# Patient Record
Sex: FEMALE | Race: WHITE | NOT HISPANIC OR LATINO | Employment: FULL TIME | URBAN - METROPOLITAN AREA
[De-identification: names, ages, dates, MRNs, and addresses within clinical notes are randomized per-mention and may not be internally consistent; named-entity substitution may affect disease eponyms.]

---

## 2017-01-27 ENCOUNTER — OFFICE VISIT (OUTPATIENT)
Dept: INTERNAL MEDICINE | Facility: CLINIC | Age: 57
End: 2017-01-27
Payer: COMMERCIAL

## 2017-01-27 ENCOUNTER — HOSPITAL ENCOUNTER (OUTPATIENT)
Dept: RADIOLOGY | Facility: HOSPITAL | Age: 57
Discharge: HOME | End: 2017-01-27
Attending: INTERNAL MEDICINE
Payer: COMMERCIAL

## 2017-01-27 VITALS
HEIGHT: 63 IN | HEART RATE: 68 BPM | SYSTOLIC BLOOD PRESSURE: 120 MMHG | WEIGHT: 151.69 LBS | BODY MASS INDEX: 26.88 KG/M2 | DIASTOLIC BLOOD PRESSURE: 84 MMHG

## 2017-01-27 DIAGNOSIS — J44.9 CHRONIC OBSTRUCTIVE PULMONARY DISEASE, UNSPECIFIED COPD TYPE: Primary | ICD-10-CM

## 2017-01-27 DIAGNOSIS — F17.200 SMOKER: ICD-10-CM

## 2017-01-27 DIAGNOSIS — Z12.31 ENCOUNTER FOR SCREENING MAMMOGRAM FOR MALIGNANT NEOPLASM OF BREAST: ICD-10-CM

## 2017-01-27 PROCEDURE — 77067 SCR MAMMO BI INCL CAD: CPT | Mod: TC

## 2017-01-27 PROCEDURE — 77067 SCR MAMMO BI INCL CAD: CPT | Mod: 26,,, | Performed by: RADIOLOGY

## 2017-01-27 PROCEDURE — 99999 PR PBB SHADOW E&M-EST. PATIENT-LVL III: CPT | Mod: PBBFAC,,, | Performed by: INTERNAL MEDICINE

## 2017-01-27 PROCEDURE — 1159F MED LIST DOCD IN RCRD: CPT | Mod: S$GLB,,, | Performed by: INTERNAL MEDICINE

## 2017-01-27 PROCEDURE — 99214 OFFICE O/P EST MOD 30 MIN: CPT | Mod: S$GLB,,, | Performed by: INTERNAL MEDICINE

## 2017-01-27 RX ORDER — FLUTICASONE PROPIONATE AND SALMETEROL 100; 50 UG/1; UG/1
POWDER RESPIRATORY (INHALATION)
Qty: 180 EACH | Refills: 3 | Status: ON HOLD | OUTPATIENT
Start: 2017-01-27 | End: 2018-01-27

## 2017-01-31 NOTE — PROGRESS NOTES
Subjective:       Patient ID: Cassia Kelly is a 57 y.o. female.    Chief Complaint: Annual Exam    Shortness of Breath   This is a chronic problem. The current episode started more than 1 year ago. The problem occurs intermittently. The problem has been unchanged. Associated symptoms include wheezing. Pertinent negatives include no chest pain, fever, leg swelling, orthopnea or sputum production. The symptoms are aggravated by smoke. She has tried beta agonist inhalers and steroid inhalers for the symptoms. The treatment provided mild relief. Her past medical history is significant for COPD.     Review of Systems   Constitutional: Negative for fever.   Respiratory: Positive for shortness of breath and wheezing. Negative for sputum production.    Cardiovascular: Negative for chest pain, orthopnea and leg swelling.       Objective:      Physical Exam   Constitutional: She is oriented to person, place, and time. She appears well-developed and well-nourished. No distress.   HENT:   Head: Normocephalic and atraumatic.   Mouth/Throat: Oropharynx is clear and moist.   Eyes: Conjunctivae are normal. Pupils are equal, round, and reactive to light.   Neck: Normal range of motion. Neck supple.   Cardiovascular: Normal rate, regular rhythm and normal heart sounds.    Pulmonary/Chest: Effort normal and breath sounds normal. She has no wheezes.   Abdominal: Soft. Bowel sounds are normal. There is no tenderness.   Musculoskeletal: Normal range of motion. She exhibits no edema or tenderness.   Neurological: She is alert and oriented to person, place, and time. No cranial nerve deficit.   Skin: No erythema.   Psychiatric: She has a normal mood and affect.   Vitals reviewed.      Assessment:       1. Chronic obstructive pulmonary disease, unspecified COPD type    2. Smoker        Plan:       Cassia Antonio was seen today for annual exam.    Diagnoses and all orders for this visit:    Chronic obstructive pulmonary disease,  unspecified COPD type  -     fluticasone-salmeterol 100-50 mcg/dose (ADVAIR DISKUS) 100-50 mcg/dose diskus inhaler; INHALE 1 PUFF INTO THE LUNGS   2 (TWO) TIMES DAILY.  -     ipratropium-albuterol (COMBIVENT RESPIMAT)  mcg/actuation inhaler; INHALE 1 PUFF INTO THE LUNGS  4 (FOUR) TIMES DAILY.    Smoker  Comments:  not interested in cessation service        Return in about 6 months (around 7/27/2017) for F/U APPOINTMENT WITH ME.

## 2017-03-13 ENCOUNTER — TELEPHONE (OUTPATIENT)
Dept: INTERNAL MEDICINE | Facility: CLINIC | Age: 57
End: 2017-03-13

## 2017-03-13 DIAGNOSIS — Z12.31 ENCOUNTER FOR SCREENING MAMMOGRAM FOR MALIGNANT NEOPLASM OF BREAST: Primary | ICD-10-CM

## 2017-03-13 NOTE — TELEPHONE ENCOUNTER
----- Message from Marco A Rondon MD sent at 3/10/2017  3:59 PM CST -----  Mammogram OK, repeat in 1 yr

## 2017-09-11 DIAGNOSIS — J44.9 CHRONIC OBSTRUCTIVE PULMONARY DISEASE, UNSPECIFIED COPD TYPE: ICD-10-CM

## 2017-09-11 NOTE — TELEPHONE ENCOUNTER
----- Message from Garrett Freddie sent at 9/8/2017  3:54 PM CDT -----  Contact: self/ [phone] cell  Type: Rx    Name of medication(s): ipratropium-albuterol (COMBIVENT RESPIMAT)  mcg/actuation inhaler    Is this a refill? New rx? Refill    Who prescribed medication? Dr. Rondon    Pharmacy Name, Phone, & Location: [facility] on file    Comments: Pt would like to request a 90 day refill with refills on the medication above.  The pt was told by the pharmacy that they have sent several request, but there is no indication in the system.  Please call and advise.    Thank you

## 2017-11-06 DIAGNOSIS — J44.9 CHRONIC OBSTRUCTIVE PULMONARY DISEASE, UNSPECIFIED COPD TYPE: ICD-10-CM

## 2017-11-06 NOTE — TELEPHONE ENCOUNTER
"----- Message from Teresa Howard sent at 11/6/2017  3:52 PM CST -----  Contact: call pt at [phone]  RX request - refill or new RX.  Is this a refill or new RX:  Refill   RX name and strength: ipratropium-albuterol (COMBIVENT RESPIMAT)  mcg/actuation inhaler  Directions:   Is this a 30 day or 90 day RX:  90 day   Pharmacy name and phone # (DON'T enter "on file" or "in chart"): [id number] - [place]  [address] [phone] (Phone)   Comments:        "

## 2018-01-24 ENCOUNTER — HOSPITAL ENCOUNTER (OUTPATIENT)
Facility: HOSPITAL | Age: 58
Discharge: HOME | End: 2018-01-27
Attending: EMERGENCY MEDICINE | Admitting: HOSPITALIST
Payer: COMMERCIAL

## 2018-01-24 DIAGNOSIS — N39.0 URINARY TRACT INFECTION WITHOUT HEMATURIA, SITE UNSPECIFIED: ICD-10-CM

## 2018-01-24 DIAGNOSIS — R79.89 ELEVATED TROPONIN: ICD-10-CM

## 2018-01-24 DIAGNOSIS — I24.9 ACUTE CORONARY SYNDROME: ICD-10-CM

## 2018-01-24 DIAGNOSIS — J44.9 CHRONIC OBSTRUCTIVE PULMONARY DISEASE, UNSPECIFIED COPD TYPE: ICD-10-CM

## 2018-01-24 DIAGNOSIS — R07.9 CHEST PAIN: ICD-10-CM

## 2018-01-24 DIAGNOSIS — J44.1 COPD EXACERBATION: Primary | ICD-10-CM

## 2018-01-24 DIAGNOSIS — J20.9 ACUTE BRONCHITIS, UNSPECIFIED ORGANISM: ICD-10-CM

## 2018-01-24 PROBLEM — M54.6 ACUTE THORACIC BACK PAIN: Status: ACTIVE | Noted: 2018-01-24

## 2018-01-24 PROBLEM — N30.00 ACUTE CYSTITIS: Status: ACTIVE | Noted: 2018-01-24

## 2018-01-24 LAB
ALBUMIN SERPL BCP-MCNC: 3.7 G/DL
ALP SERPL-CCNC: 153 U/L
ALT SERPL W/O P-5'-P-CCNC: 31 U/L
ANION GAP SERPL CALC-SCNC: 8 MMOL/L
AST SERPL-CCNC: 32 U/L
BACTERIA #/AREA URNS AUTO: ABNORMAL /HPF
BASOPHILS # BLD AUTO: 0.02 K/UL
BASOPHILS NFR BLD: 0.3 %
BILIRUB SERPL-MCNC: 1.2 MG/DL
BILIRUB UR QL STRIP: NEGATIVE
BNP SERPL-MCNC: 47 PG/ML
BUN SERPL-MCNC: 11 MG/DL
CALCIUM SERPL-MCNC: 9.2 MG/DL
CHLORIDE SERPL-SCNC: 106 MMOL/L
CLARITY UR REFRACT.AUTO: ABNORMAL
CO2 SERPL-SCNC: 27 MMOL/L
COLOR UR AUTO: ABNORMAL
CREAT SERPL-MCNC: 0.7 MG/DL
D DIMER PPP IA.FEU-MCNC: 8.54 MG/L FEU
DIFFERENTIAL METHOD: ABNORMAL
EOSINOPHIL # BLD AUTO: 0 K/UL
EOSINOPHIL NFR BLD: 0.3 %
ERYTHROCYTE [DISTWIDTH] IN BLOOD BY AUTOMATED COUNT: 12.7 %
EST. GFR  (AFRICAN AMERICAN): >60 ML/MIN/1.73 M^2
EST. GFR  (NON AFRICAN AMERICAN): >60 ML/MIN/1.73 M^2
ESTIMATED AVG GLUCOSE: 103 MG/DL
FLUAV AG SPEC QL IA: NEGATIVE
FLUBV AG SPEC QL IA: NEGATIVE
GLUCOSE SERPL-MCNC: 110 MG/DL
GLUCOSE UR QL STRIP: NEGATIVE
HBA1C MFR BLD HPLC: 5.2 %
HCT VFR BLD AUTO: 41.3 %
HGB BLD-MCNC: 13.8 G/DL
HGB UR QL STRIP: ABNORMAL
HYALINE CASTS UR QL AUTO: 2 /LPF
IMM GRANULOCYTES # BLD AUTO: 0.04 K/UL
IMM GRANULOCYTES NFR BLD AUTO: 0.6 %
KETONES UR QL STRIP: NEGATIVE
LACTATE SERPL-SCNC: 2.5 MMOL/L
LDH SERPL L TO P-CCNC: 220 U/L
LEUKOCYTE ESTERASE UR QL STRIP: ABNORMAL
LYMPHOCYTES # BLD AUTO: 0.3 K/UL
LYMPHOCYTES NFR BLD: 4.3 %
MCH RBC QN AUTO: 31.3 PG
MCHC RBC AUTO-ENTMCNC: 33.4 G/DL
MCV RBC AUTO: 94 FL
MICROSCOPIC COMMENT: ABNORMAL
MONOCYTES # BLD AUTO: 0 K/UL
MONOCYTES NFR BLD: 0.3 %
NEUTROPHILS # BLD AUTO: 6.4 K/UL
NEUTROPHILS NFR BLD: 94.2 %
NITRITE UR QL STRIP: POSITIVE
NON-SQ EPI CELLS #/AREA URNS AUTO: 1 /HPF
NRBC BLD-RTO: 0 /100 WBC
PH UR STRIP: 5 [PH] (ref 5–8)
PLATELET # BLD AUTO: 142 K/UL
PMV BLD AUTO: 11.3 FL
POCT GLUCOSE: 135 MG/DL (ref 70–110)
POTASSIUM SERPL-SCNC: 4.1 MMOL/L
PROCALCITONIN SERPL IA-MCNC: 22.37 NG/ML
PROT SERPL-MCNC: 7.3 G/DL
PROT UR QL STRIP: ABNORMAL
RBC # BLD AUTO: 4.41 M/UL
RBC #/AREA URNS AUTO: 24 /HPF (ref 0–4)
SODIUM SERPL-SCNC: 141 MMOL/L
SP GR UR STRIP: 1.02 (ref 1–1.03)
SPECIMEN SOURCE: NORMAL
SQUAMOUS #/AREA URNS AUTO: 0 /HPF
TROPONIN I SERPL DL<=0.01 NG/ML-MCNC: 0.5 NG/ML
URN SPEC COLLECT METH UR: ABNORMAL
UROBILINOGEN UR STRIP-ACNC: 2 EU/DL
WBC # BLD AUTO: 6.76 K/UL
WBC #/AREA URNS AUTO: 32 /HPF (ref 0–5)

## 2018-01-24 PROCEDURE — 63600175 PHARM REV CODE 636 W HCPCS: Performed by: EMERGENCY MEDICINE

## 2018-01-24 PROCEDURE — 83615 LACTATE (LD) (LDH) ENZYME: CPT

## 2018-01-24 PROCEDURE — 87186 SC STD MICRODIL/AGAR DIL: CPT

## 2018-01-24 PROCEDURE — 85025 COMPLETE CBC W/AUTO DIFF WBC: CPT

## 2018-01-24 PROCEDURE — 87040 BLOOD CULTURE FOR BACTERIA: CPT | Mod: 59

## 2018-01-24 PROCEDURE — 99285 EMERGENCY DEPT VISIT HI MDM: CPT | Mod: 25

## 2018-01-24 PROCEDURE — 96361 HYDRATE IV INFUSION ADD-ON: CPT

## 2018-01-24 PROCEDURE — 25000242 PHARM REV CODE 250 ALT 637 W/ HCPCS: Performed by: EMERGENCY MEDICINE

## 2018-01-24 PROCEDURE — 85379 FIBRIN DEGRADATION QUANT: CPT

## 2018-01-24 PROCEDURE — 25000003 PHARM REV CODE 250: Performed by: EMERGENCY MEDICINE

## 2018-01-24 PROCEDURE — 87086 URINE CULTURE/COLONY COUNT: CPT

## 2018-01-24 PROCEDURE — 83036 HEMOGLOBIN GLYCOSYLATED A1C: CPT

## 2018-01-24 PROCEDURE — 84484 ASSAY OF TROPONIN QUANT: CPT

## 2018-01-24 PROCEDURE — 25000003 PHARM REV CODE 250: Performed by: HOSPITALIST

## 2018-01-24 PROCEDURE — 93005 ELECTROCARDIOGRAM TRACING: CPT

## 2018-01-24 PROCEDURE — 25500020 PHARM REV CODE 255: Performed by: HOSPITALIST

## 2018-01-24 PROCEDURE — 87040 BLOOD CULTURE FOR BACTERIA: CPT

## 2018-01-24 PROCEDURE — 84484 ASSAY OF TROPONIN QUANT: CPT | Mod: 91

## 2018-01-24 PROCEDURE — 83605 ASSAY OF LACTIC ACID: CPT

## 2018-01-24 PROCEDURE — 80053 COMPREHEN METABOLIC PANEL: CPT

## 2018-01-24 PROCEDURE — 87077 CULTURE AEROBIC IDENTIFY: CPT

## 2018-01-24 PROCEDURE — 94640 AIRWAY INHALATION TREATMENT: CPT

## 2018-01-24 PROCEDURE — 87088 URINE BACTERIA CULTURE: CPT

## 2018-01-24 PROCEDURE — 96374 THER/PROPH/DIAG INJ IV PUSH: CPT

## 2018-01-24 PROCEDURE — 82962 GLUCOSE BLOOD TEST: CPT

## 2018-01-24 PROCEDURE — 86140 C-REACTIVE PROTEIN: CPT

## 2018-01-24 PROCEDURE — G0378 HOSPITAL OBSERVATION PER HR: HCPCS

## 2018-01-24 PROCEDURE — 81001 URINALYSIS AUTO W/SCOPE: CPT

## 2018-01-24 PROCEDURE — 83880 ASSAY OF NATRIURETIC PEPTIDE: CPT

## 2018-01-24 PROCEDURE — 93010 ELECTROCARDIOGRAM REPORT: CPT | Mod: 76,,, | Performed by: INTERNAL MEDICINE

## 2018-01-24 PROCEDURE — 99220 PR INITIAL OBSERVATION CARE,LEVL III: CPT | Mod: ,,, | Performed by: PHYSICIAN ASSISTANT

## 2018-01-24 PROCEDURE — 84145 PROCALCITONIN (PCT): CPT

## 2018-01-24 PROCEDURE — 87400 INFLUENZA A/B EACH AG IA: CPT | Mod: 59

## 2018-01-24 RX ORDER — ASPIRIN 325 MG
325 TABLET ORAL
Status: CANCELLED | OUTPATIENT
Start: 2018-01-24 | End: 2018-01-24

## 2018-01-24 RX ORDER — ENOXAPARIN SODIUM 100 MG/ML
1 INJECTION SUBCUTANEOUS
Status: DISCONTINUED | OUTPATIENT
Start: 2018-01-25 | End: 2018-01-25

## 2018-01-24 RX ORDER — KETOROLAC TROMETHAMINE 30 MG/ML
15 INJECTION, SOLUTION INTRAMUSCULAR; INTRAVENOUS EVERY 6 HOURS PRN
Status: DISCONTINUED | OUTPATIENT
Start: 2018-01-24 | End: 2018-01-27 | Stop reason: HOSPADM

## 2018-01-24 RX ORDER — IPRATROPIUM BROMIDE AND ALBUTEROL SULFATE 2.5; .5 MG/3ML; MG/3ML
3 SOLUTION RESPIRATORY (INHALATION) EVERY 4 HOURS
Status: DISCONTINUED | OUTPATIENT
Start: 2018-01-25 | End: 2018-01-27 | Stop reason: HOSPADM

## 2018-01-24 RX ORDER — RAMELTEON 8 MG/1
8 TABLET ORAL NIGHTLY PRN
Status: DISCONTINUED | OUTPATIENT
Start: 2018-01-24 | End: 2018-01-27 | Stop reason: HOSPADM

## 2018-01-24 RX ORDER — PREDNISONE 20 MG/1
40 TABLET ORAL DAILY
Status: DISCONTINUED | OUTPATIENT
Start: 2018-01-25 | End: 2018-01-27 | Stop reason: HOSPADM

## 2018-01-24 RX ORDER — ONDANSETRON 2 MG/ML
4 INJECTION INTRAMUSCULAR; INTRAVENOUS EVERY 12 HOURS PRN
Status: DISCONTINUED | OUTPATIENT
Start: 2018-01-24 | End: 2018-01-27 | Stop reason: HOSPADM

## 2018-01-24 RX ORDER — IBUPROFEN 200 MG
1 TABLET ORAL DAILY
Status: DISCONTINUED | OUTPATIENT
Start: 2018-01-25 | End: 2018-01-27 | Stop reason: HOSPADM

## 2018-01-24 RX ORDER — PREDNISONE 20 MG/1
40 TABLET ORAL DAILY
Qty: 10 TABLET | Refills: 0 | Status: SHIPPED | OUTPATIENT
Start: 2018-01-24 | End: 2018-01-29

## 2018-01-24 RX ORDER — POLYETHYLENE GLYCOL 3350 17 G/17G
17 POWDER, FOR SOLUTION ORAL DAILY
Status: DISCONTINUED | OUTPATIENT
Start: 2018-01-25 | End: 2018-01-27 | Stop reason: HOSPADM

## 2018-01-24 RX ORDER — IPRATROPIUM BROMIDE AND ALBUTEROL SULFATE 2.5; .5 MG/3ML; MG/3ML
3 SOLUTION RESPIRATORY (INHALATION)
Status: COMPLETED | OUTPATIENT
Start: 2018-01-24 | End: 2018-01-24

## 2018-01-24 RX ORDER — TIOTROPIUM BROMIDE 18 UG/1
1 CAPSULE ORAL; RESPIRATORY (INHALATION) DAILY
Status: DISCONTINUED | OUTPATIENT
Start: 2018-01-25 | End: 2018-01-27 | Stop reason: HOSPADM

## 2018-01-24 RX ORDER — DOXYCYCLINE HYCLATE 100 MG
100 TABLET ORAL
Status: COMPLETED | OUTPATIENT
Start: 2018-01-24 | End: 2018-01-24

## 2018-01-24 RX ORDER — IBUPROFEN 200 MG
16 TABLET ORAL
Status: DISCONTINUED | OUTPATIENT
Start: 2018-01-24 | End: 2018-01-27 | Stop reason: HOSPADM

## 2018-01-24 RX ORDER — HYDROCODONE BITARTRATE AND ACETAMINOPHEN 5; 325 MG/1; MG/1
1 TABLET ORAL
Status: COMPLETED | OUTPATIENT
Start: 2018-01-24 | End: 2018-01-24

## 2018-01-24 RX ORDER — DOXYCYCLINE 100 MG/1
100 CAPSULE ORAL 2 TIMES DAILY
Qty: 20 CAPSULE | Refills: 0 | Status: SHIPPED | OUTPATIENT
Start: 2018-01-24 | End: 2018-02-03

## 2018-01-24 RX ORDER — LEVOFLOXACIN 500 MG/1
750 TABLET, FILM COATED ORAL DAILY
Qty: 5 TABLET | Refills: 0 | Status: SHIPPED | OUTPATIENT
Start: 2018-01-24 | End: 2018-01-29

## 2018-01-24 RX ORDER — ENOXAPARIN SODIUM 100 MG/ML
40 INJECTION SUBCUTANEOUS EVERY 24 HOURS
Status: DISCONTINUED | OUTPATIENT
Start: 2018-01-24 | End: 2018-01-24

## 2018-01-24 RX ORDER — CEFTRIAXONE 1 G/1
1 INJECTION, POWDER, FOR SOLUTION INTRAMUSCULAR; INTRAVENOUS
Status: COMPLETED | OUTPATIENT
Start: 2018-01-24 | End: 2018-01-24

## 2018-01-24 RX ORDER — ACETAMINOPHEN 325 MG/1
650 TABLET ORAL EVERY 8 HOURS PRN
Status: DISCONTINUED | OUTPATIENT
Start: 2018-01-24 | End: 2018-01-24

## 2018-01-24 RX ORDER — MOXIFLOXACIN HYDROCHLORIDE 400 MG/1
400 TABLET ORAL DAILY
Status: DISCONTINUED | OUTPATIENT
Start: 2018-01-25 | End: 2018-01-25

## 2018-01-24 RX ORDER — ACETAMINOPHEN 325 MG/1
650 TABLET ORAL EVERY 4 HOURS PRN
Status: DISCONTINUED | OUTPATIENT
Start: 2018-01-24 | End: 2018-01-27 | Stop reason: HOSPADM

## 2018-01-24 RX ORDER — METHYLPREDNISOLONE SOD SUCC 125 MG
125 VIAL (EA) INJECTION
Status: COMPLETED | OUTPATIENT
Start: 2018-01-24 | End: 2018-01-24

## 2018-01-24 RX ORDER — GLUCAGON 1 MG
1 KIT INJECTION
Status: DISCONTINUED | OUTPATIENT
Start: 2018-01-24 | End: 2018-01-27 | Stop reason: HOSPADM

## 2018-01-24 RX ORDER — BISACODYL 10 MG
10 SUPPOSITORY, RECTAL RECTAL DAILY PRN
Status: DISCONTINUED | OUTPATIENT
Start: 2018-01-24 | End: 2018-01-27 | Stop reason: HOSPADM

## 2018-01-24 RX ORDER — ASPIRIN 325 MG
325 TABLET, DELAYED RELEASE (ENTERIC COATED) ORAL
Status: COMPLETED | OUTPATIENT
Start: 2018-01-24 | End: 2018-01-24

## 2018-01-24 RX ORDER — ASPIRIN 81 MG/1
81 TABLET ORAL DAILY
Status: DISCONTINUED | OUTPATIENT
Start: 2018-01-25 | End: 2018-01-27 | Stop reason: HOSPADM

## 2018-01-24 RX ORDER — NITROGLYCERIN 0.4 MG/1
0.4 TABLET SUBLINGUAL
Status: COMPLETED | OUTPATIENT
Start: 2018-01-24 | End: 2018-01-24

## 2018-01-24 RX ORDER — ASPIRIN 325 MG
325 TABLET ORAL
Status: DISCONTINUED | OUTPATIENT
Start: 2018-01-24 | End: 2018-01-24

## 2018-01-24 RX ORDER — FLUTICASONE FUROATE AND VILANTEROL 100; 25 UG/1; UG/1
1 POWDER RESPIRATORY (INHALATION) DAILY
Status: DISCONTINUED | OUTPATIENT
Start: 2018-01-25 | End: 2018-01-27 | Stop reason: HOSPADM

## 2018-01-24 RX ORDER — IBUPROFEN 200 MG
24 TABLET ORAL
Status: DISCONTINUED | OUTPATIENT
Start: 2018-01-24 | End: 2018-01-27 | Stop reason: HOSPADM

## 2018-01-24 RX ORDER — ONDANSETRON 8 MG/1
8 TABLET, ORALLY DISINTEGRATING ORAL EVERY 8 HOURS PRN
Status: DISCONTINUED | OUTPATIENT
Start: 2018-01-24 | End: 2018-01-27 | Stop reason: HOSPADM

## 2018-01-24 RX ORDER — HYDROCODONE BITARTRATE AND ACETAMINOPHEN 5; 325 MG/1; MG/1
1 TABLET ORAL EVERY 4 HOURS PRN
Qty: 10 TABLET | Refills: 0 | Status: SHIPPED | OUTPATIENT
Start: 2018-01-24 | End: 2018-01-31

## 2018-01-24 RX ADMIN — ASPIRIN 325 MG: 325 TABLET, DELAYED RELEASE ORAL at 08:01

## 2018-01-24 RX ADMIN — IPRATROPIUM BROMIDE AND ALBUTEROL SULFATE 3 ML: .5; 3 SOLUTION RESPIRATORY (INHALATION) at 07:01

## 2018-01-24 RX ADMIN — SODIUM CHLORIDE 1000 ML: 0.9 INJECTION, SOLUTION INTRAVENOUS at 08:01

## 2018-01-24 RX ADMIN — IOHEXOL 75 ML: 350 INJECTION, SOLUTION INTRAVENOUS at 11:01

## 2018-01-24 RX ADMIN — METHYLPREDNISOLONE SODIUM SUCCINATE 125 MG: 125 INJECTION, POWDER, FOR SOLUTION INTRAMUSCULAR; INTRAVENOUS at 08:01

## 2018-01-24 RX ADMIN — DOXYCYCLINE HYCLATE 100 MG: 100 TABLET, COATED ORAL at 08:01

## 2018-01-24 RX ADMIN — NITROGLYCERIN 0.4 MG: 0.4 TABLET SUBLINGUAL at 09:01

## 2018-01-24 RX ADMIN — CEFTRIAXONE SODIUM 1 G: 1 INJECTION, POWDER, FOR SOLUTION INTRAMUSCULAR; INTRAVENOUS at 08:01

## 2018-01-24 RX ADMIN — SODIUM CHLORIDE 1000 ML: 0.9 INJECTION, SOLUTION INTRAVENOUS at 09:01

## 2018-01-24 RX ADMIN — HYDROCODONE BITARTRATE AND ACETAMINOPHEN 1 TABLET: 5; 325 TABLET ORAL at 09:01

## 2018-01-24 NOTE — ED TRIAGE NOTES
"Pt states she suddenly started shaking violently  Pt put her 's oxygen on 3 L/min per nasal cannula because she was short of breath  Pt arrived in room with O2 at 3L/dwight per nasal cannula Pt complains of upper back pain rating pain a 5/10  States she still feels " a little short of breath"   "

## 2018-01-25 PROBLEM — R79.89 ELEVATED D-DIMER: Status: ACTIVE | Noted: 2018-01-25

## 2018-01-25 PROBLEM — R79.89 ELEVATED PROCALCITONIN: Status: ACTIVE | Noted: 2018-01-25

## 2018-01-25 LAB
BASOPHILS # BLD AUTO: 0.01 K/UL
BASOPHILS NFR BLD: 0.1 %
CHOLEST SERPL-MCNC: 167 MG/DL
CHOLEST/HDLC SERPL: 4.6 {RATIO}
CRP SERPL-MCNC: 66.4 MG/L
DIASTOLIC DYSFUNCTION: NO
DIFFERENTIAL METHOD: ABNORMAL
EOSINOPHIL # BLD AUTO: 0 K/UL
EOSINOPHIL NFR BLD: 0 %
ERYTHROCYTE [DISTWIDTH] IN BLOOD BY AUTOMATED COUNT: 12.6 %
ERYTHROCYTE [SEDIMENTATION RATE] IN BLOOD BY WESTERGREN METHOD: 20 MM/HR
HCT VFR BLD AUTO: 34.5 %
HDLC SERPL-MCNC: 36 MG/DL
HDLC SERPL: 21.6 %
HGB BLD-MCNC: 11.9 G/DL
IMM GRANULOCYTES # BLD AUTO: 0.03 K/UL
IMM GRANULOCYTES NFR BLD AUTO: 0.3 %
LDLC SERPL CALC-MCNC: 107 MG/DL
LYMPHOCYTES # BLD AUTO: 0.4 K/UL
LYMPHOCYTES NFR BLD: 3.8 %
MCH RBC QN AUTO: 31.9 PG
MCHC RBC AUTO-ENTMCNC: 34.5 G/DL
MCV RBC AUTO: 93 FL
MITRAL VALVE MOBILITY: NORMAL
MONOCYTES # BLD AUTO: 0.1 K/UL
MONOCYTES NFR BLD: 0.9 %
NEUTROPHILS # BLD AUTO: 9.5 K/UL
NEUTROPHILS NFR BLD: 94.9 %
NONHDLC SERPL-MCNC: 131 MG/DL
NRBC BLD-RTO: 0 /100 WBC
PLATELET # BLD AUTO: 127 K/UL
PMV BLD AUTO: 12.4 FL
POCT GLUCOSE: 137 MG/DL (ref 70–110)
POCT GLUCOSE: 191 MG/DL (ref 70–110)
RBC # BLD AUTO: 3.73 M/UL
RETIRED EF AND QEF - SEE NOTES: 55 (ref 55–65)
TRIGL SERPL-MCNC: 120 MG/DL
TROPONIN I SERPL DL<=0.01 NG/ML-MCNC: 0.5 NG/ML
TROPONIN I SERPL DL<=0.01 NG/ML-MCNC: 0.98 NG/ML
WBC # BLD AUTO: 9.97 K/UL

## 2018-01-25 PROCEDURE — G0378 HOSPITAL OBSERVATION PER HR: HCPCS

## 2018-01-25 PROCEDURE — 94640 AIRWAY INHALATION TREATMENT: CPT

## 2018-01-25 PROCEDURE — 97165 OT EVAL LOW COMPLEX 30 MIN: CPT

## 2018-01-25 PROCEDURE — 25000003 PHARM REV CODE 250: Performed by: HOSPITALIST

## 2018-01-25 PROCEDURE — 84484 ASSAY OF TROPONIN QUANT: CPT

## 2018-01-25 PROCEDURE — 63600175 PHARM REV CODE 636 W HCPCS: Performed by: PHYSICIAN ASSISTANT

## 2018-01-25 PROCEDURE — 25000003 PHARM REV CODE 250: Performed by: PHYSICIAN ASSISTANT

## 2018-01-25 PROCEDURE — 93306 TTE W/DOPPLER COMPLETE: CPT | Mod: 26,,, | Performed by: INTERNAL MEDICINE

## 2018-01-25 PROCEDURE — S4991 NICOTINE PATCH NONLEGEND: HCPCS | Performed by: PHYSICIAN ASSISTANT

## 2018-01-25 PROCEDURE — G8987 SELF CARE CURRENT STATUS: HCPCS | Mod: CH

## 2018-01-25 PROCEDURE — 25000242 PHARM REV CODE 250 ALT 637 W/ HCPCS: Performed by: PHYSICIAN ASSISTANT

## 2018-01-25 PROCEDURE — 97161 PT EVAL LOW COMPLEX 20 MIN: CPT

## 2018-01-25 PROCEDURE — G8980 MOBILITY D/C STATUS: HCPCS | Mod: CH

## 2018-01-25 PROCEDURE — G8978 MOBILITY CURRENT STATUS: HCPCS | Mod: CH

## 2018-01-25 PROCEDURE — G8988 SELF CARE GOAL STATUS: HCPCS | Mod: CH

## 2018-01-25 PROCEDURE — 27000221 HC OXYGEN, UP TO 24 HOURS

## 2018-01-25 PROCEDURE — 36415 COLL VENOUS BLD VENIPUNCTURE: CPT

## 2018-01-25 PROCEDURE — 80061 LIPID PANEL: CPT

## 2018-01-25 PROCEDURE — 99226 PR SUBSEQUENT OBSERVATION CARE,LEVEL III: CPT | Mod: ,,, | Performed by: PHYSICIAN ASSISTANT

## 2018-01-25 PROCEDURE — G8989 SELF CARE D/C STATUS: HCPCS | Mod: CH

## 2018-01-25 PROCEDURE — 94761 N-INVAS EAR/PLS OXIMETRY MLT: CPT

## 2018-01-25 PROCEDURE — 85651 RBC SED RATE NONAUTOMATED: CPT

## 2018-01-25 PROCEDURE — 93306 TTE W/DOPPLER COMPLETE: CPT

## 2018-01-25 PROCEDURE — 85025 COMPLETE CBC W/AUTO DIFF WBC: CPT

## 2018-01-25 RX ORDER — AZITHROMYCIN 250 MG/1
500 TABLET, FILM COATED ORAL ONCE
Status: COMPLETED | OUTPATIENT
Start: 2018-01-25 | End: 2018-01-25

## 2018-01-25 RX ORDER — AZITHROMYCIN 250 MG/1
250 TABLET, FILM COATED ORAL DAILY
Status: DISCONTINUED | OUTPATIENT
Start: 2018-01-26 | End: 2018-01-27 | Stop reason: HOSPADM

## 2018-01-25 RX ORDER — CEFTRIAXONE 1 G/1
1 INJECTION, POWDER, FOR SOLUTION INTRAMUSCULAR; INTRAVENOUS
Status: DISCONTINUED | OUTPATIENT
Start: 2018-01-25 | End: 2018-01-27 | Stop reason: HOSPADM

## 2018-01-25 RX ORDER — OSELTAMIVIR PHOSPHATE 75 MG/1
75 CAPSULE ORAL 2 TIMES DAILY
Status: DISCONTINUED | OUTPATIENT
Start: 2018-01-25 | End: 2018-01-27 | Stop reason: HOSPADM

## 2018-01-25 RX ORDER — AZITHROMYCIN 250 MG/1
250 TABLET, FILM COATED ORAL DAILY
Status: DISCONTINUED | OUTPATIENT
Start: 2018-01-26 | End: 2018-01-25

## 2018-01-25 RX ORDER — ENOXAPARIN SODIUM 100 MG/ML
40 INJECTION SUBCUTANEOUS EVERY 24 HOURS
Status: DISCONTINUED | OUTPATIENT
Start: 2018-01-25 | End: 2018-01-27 | Stop reason: HOSPADM

## 2018-01-25 RX ORDER — AZITHROMYCIN 250 MG/1
500 TABLET, FILM COATED ORAL DAILY
Status: DISCONTINUED | OUTPATIENT
Start: 2018-01-25 | End: 2018-01-25

## 2018-01-25 RX ORDER — AZITHROMYCIN 250 MG/1
500 TABLET, FILM COATED ORAL ONCE
Status: DISCONTINUED | OUTPATIENT
Start: 2018-01-25 | End: 2018-01-25

## 2018-01-25 RX ADMIN — ENOXAPARIN SODIUM 40 MG: 100 INJECTION SUBCUTANEOUS at 05:01

## 2018-01-25 RX ADMIN — IPRATROPIUM BROMIDE AND ALBUTEROL SULFATE 3 ML: .5; 3 SOLUTION RESPIRATORY (INHALATION) at 07:01

## 2018-01-25 RX ADMIN — OSELTAMIVIR PHOSPHATE 75 MG: 75 CAPSULE ORAL at 04:01

## 2018-01-25 RX ADMIN — TIOTROPIUM BROMIDE 18 MCG: 18 CAPSULE ORAL; RESPIRATORY (INHALATION) at 04:01

## 2018-01-25 RX ADMIN — ASPIRIN 81 MG: 81 TABLET, COATED ORAL at 10:01

## 2018-01-25 RX ADMIN — NICOTINE 1 PATCH: 14 PATCH, EXTENDED RELEASE TRANSDERMAL at 10:01

## 2018-01-25 RX ADMIN — RAMELTEON 8 MG: 8 TABLET, FILM COATED ORAL at 09:01

## 2018-01-25 RX ADMIN — CEFTRIAXONE SODIUM 1 G: 1 INJECTION, POWDER, FOR SOLUTION INTRAMUSCULAR; INTRAVENOUS at 08:01

## 2018-01-25 RX ADMIN — AZITHROMYCIN DIHYDRATE 500 MG: 250 TABLET, FILM COATED ORAL at 05:01

## 2018-01-25 RX ADMIN — IPRATROPIUM BROMIDE AND ALBUTEROL SULFATE 3 ML: .5; 3 SOLUTION RESPIRATORY (INHALATION) at 04:01

## 2018-01-25 RX ADMIN — IPRATROPIUM BROMIDE AND ALBUTEROL SULFATE 3 ML: .5; 3 SOLUTION RESPIRATORY (INHALATION) at 03:01

## 2018-01-25 RX ADMIN — OSELTAMIVIR PHOSPHATE 75 MG: 75 CAPSULE ORAL at 08:01

## 2018-01-25 RX ADMIN — FLUTICASONE FUROATE AND VILANTEROL TRIFENATATE 1 PUFF: 100; 25 POWDER RESPIRATORY (INHALATION) at 04:01

## 2018-01-25 RX ADMIN — IPRATROPIUM BROMIDE AND ALBUTEROL SULFATE 3 ML: .5; 3 SOLUTION RESPIRATORY (INHALATION) at 11:01

## 2018-01-25 RX ADMIN — PREDNISONE 40 MG: 20 TABLET ORAL at 10:01

## 2018-01-25 NOTE — HPI
"Cassia Kelly is a 58F with COPD, tobacco use who presents for evaluation of fever and rigors. The patient reports a fever of 101.2F at home 1 day PTA, she woke up feeling better although a little weaker than usual and with increased cough. She went to work as a  and when she came up she had an acute episode of rigors that was uncontrollable and associated with SOB and "gasping" for air. She additionally reports increased SOB from her baseline and has NOT been on her COPD controller medications for the past month because they "didn't do anything". She continues to smoke. She is not prescribed home O2, but will occassionally use her 's at night. She endorses R upper back pain, which she attributes to continued coughing. This is a chronic problem for her and has improved s/p pain medications. This pain is reproducible with palpation. She denies any CP currently, she denies CP during exertion. She does report RATLIFF, but this is chronic and unchanged. Her brother  at age 52 from an MI, her father has a pacemarker. She denies history of HLD, CAD, HTN, DM. She denies any urinary complaints, flank pain, abdominal pain, hot joints, LE claudication or swelling.  "

## 2018-01-25 NOTE — PLAN OF CARE
Problem: Occupational Therapy Goal  Goal: Occupational Therapy Goal  Outcome: Outcome(s) achieved Date Met: 01/25/18  [place] and D/C OT 1/25/18

## 2018-01-25 NOTE — SUBJECTIVE & OBJECTIVE
Past Medical History:   Diagnosis Date    Abnormal Pap smear     Hyst    Anemia     Asthma     Cervical cancer     COPD (chronic obstructive pulmonary disease)     STD (sexually transmitted disease)        Past Surgical History:   Procedure Laterality Date    APPENDECTOMY       SECTION      x1    HYSTERECTOMY      LAVERN/LSO (Abn Pap)    OOPHORECTOMY      Vocal chord nodules removed         Review of patient's allergies indicates:   Allergen Reactions    Penicillins Rash       No current facility-administered medications on file prior to encounter.      Current Outpatient Prescriptions on File Prior to Encounter   Medication Sig    fluticasone-salmeterol 100-50 mcg/dose (ADVAIR DISKUS) 100-50 mcg/dose diskus inhaler INHALE 1 PUFF INTO THE LUNGS   2 (TWO) TIMES DAILY.    ipratropium-albuterol (COMBIVENT RESPIMAT)  mcg/actuation inhaler INHALE 1 PUFF INTO THE LUNGS  4 (FOUR) TIMES DAILY.     Family History     Problem Relation (Age of Onset)    Cancer Paternal Aunt    Heart disease Sister, Brother    Hypertension Father, Mother        Social History Main Topics    Smoking status: Current Some Day Smoker     Packs/day: 1.00     Types: Cigarettes    Smokeless tobacco: Never Used    Alcohol use No    Drug use: Unknown    Sexual activity: Yes     Partners: Male     Birth control/ protection: Post-menopausal     Review of Systems   Constitutional: Positive for chills, fatigue and fever.   HENT: Negative for rhinorrhea and sore throat.    Respiratory: Positive for cough, shortness of breath and wheezing. Negative for chest tightness.    Cardiovascular: Negative for chest pain, palpitations and leg swelling.   Gastrointestinal: Negative for abdominal pain, blood in stool, diarrhea, nausea and vomiting.   Genitourinary: Negative for difficulty urinating, dysuria, flank pain and urgency.   Musculoskeletal: Positive for back pain (upper, right). Negative for arthralgias and myalgias.    Skin: Negative for rash and wound.   Neurological: Positive for weakness. Negative for dizziness, syncope, light-headedness and numbness.   Psychiatric/Behavioral: Negative for agitation and confusion.     Objective:     Vital Signs (Most Recent):  Temp: 98.3 °F (36.8 °C) (01/25/18 0024)  Pulse: 79 (01/25/18 0024)  Resp: 20 (01/25/18 0024)  BP: 106/62 (01/25/18 0024)  SpO2: (!) 89 % (01/25/18 0024) Vital Signs (24h Range):  Temp:  [98.3 °F (36.8 °C)-98.9 °F (37.2 °C)] 98.3 °F (36.8 °C)  Pulse:  [] 79  Resp:  [16-20] 20  SpO2:  [89 %-97 %] 89 %  BP: (106-150)/(62-84) 106/62     Weight: 68.8 kg (151 lb 10.8 oz)  Body mass index is 26.87 kg/m².    Physical Exam   Constitutional: She is oriented to person, place, and time. She appears well-developed and well-nourished. No distress.   HENT:   Head: Normocephalic and atraumatic.   Eyes: EOM are normal. Pupils are equal, round, and reactive to light.   Neck: Normal range of motion. Neck supple.   Cardiovascular: Normal rate and regular rhythm.    No murmur heard.  Pulmonary/Chest: No accessory muscle usage. No tachypnea. She is in respiratory distress. She has decreased breath sounds in the right middle field, the right lower field, the left middle field and the left lower field. She has wheezes.           Decreased BS BL bases, mild expiratory wheeze   Abdominal: Soft. Bowel sounds are normal. She exhibits no distension.   Musculoskeletal: Normal range of motion. She exhibits no edema.   Neurological: She is alert and oriented to person, place, and time. No cranial nerve deficit.   Skin: She is not diaphoretic.   Psychiatric: She has a normal mood and affect. Her behavior is normal. Judgment and thought content normal.   Nursing note and vitals reviewed.        CRANIAL NERVES     CN III, IV, VI   Pupils are equal, round, and reactive to light.  Extraocular motions are normal.        Significant Labs:   A1C:   Recent Labs  Lab 01/24/18  1805   HGBA1C 5.2     Blood  Culture: No results for input(s): LABBLOO in the last 48 hours.  CBC:   Recent Labs  Lab 01/24/18  1805   WBC 6.76   HGB 13.8   HCT 41.3   *     CMP:   Recent Labs  Lab 01/24/18  1805      K 4.1      CO2 27      BUN 11   CREATININE 0.7   CALCIUM 9.2   PROT 7.3   ALBUMIN 3.7   BILITOT 1.2*   ALKPHOS 153*   AST 32   ALT 31   ANIONGAP 8   EGFRNONAA >60.0     Cardiac Markers:   Recent Labs  Lab 01/24/18  2158   BNP 47     Coagulation: No results for input(s): PT, INR, APTT in the last 48 hours.  Lactic Acid:   Recent Labs  Lab 01/24/18  1909   LACTATE 2.5*     Lipase: No results for input(s): LIPASE in the last 48 hours.  Lipid Panel: No results for input(s): CHOL, HDL, LDLCALC, TRIG, CHOLHDL in the last 48 hours.  Respiratory Culture: No results for input(s): GSRESP, RESPIRATORYC in the last 48 hours.  Troponin:   Recent Labs  Lab 01/24/18  1909   TROPONINI 0.499*     TSH: No results for input(s): TSH in the last 4320 hours.  Urine Culture: No results for input(s): LABURIN in the last 48 hours.  Urine Studies:   Recent Labs  Lab 01/24/18  1931   COLORU Jennifer   APPEARANCEUA Cloudy*   PHUR 5.0   SPECGRAV 1.020   PROTEINUA 2+*   GLUCUA Negative   KETONESU Negative   BILIRUBINUA Negative   OCCULTUA 2+*   NITRITE Positive*   UROBILINOGEN 2.0   LEUKOCYTESUR 2+*   RBCUA 24*   WBCUA 32*   BACTERIA Moderate*   SQUAMEPITHEL 0   HYALINECASTS 2*       Significant Imaging: CT: I have reviewed all pertinent results/findings within the past 24 hours and my personal findings are:  no PE  CXR: I have reviewed all pertinent results/findings within the past 24 hours and my personal findings are:  no PNA  EKG: I have reviewed all pertinent results/findings within the past 24 hours and my personal findings are: no ischemic changes

## 2018-01-25 NOTE — H&P
"[facility] Hospital Medicine  History & Physical    Patient Name: Cassia Kelly  MRN: [id number]  Admission Date: 2018  Attending Physician: Wandy Quan MD   Primary Care Provider: Marco A Rondon MD    [facility] Medicine Team: [facility] HOSP MED E Hermelindo Lott PA-C     Patient information was obtained from patient, relative(s), past medical records and ER records.     Subjective:     Principal Problem:COPD exacerbation    Chief Complaint:   Chief Complaint   Patient presents with    Chills     fever yesterday 101.2. No fever at this time. vomting in the ambulance.     Shortness of Breath        HPI: Cassia Kelly is a 58F with COPD, tobacco use who presents for evaluation of fever and rigors. The patient reports a fever of 101.2F at home 1 day PTA, she woke up feeling better although a little weaker than usual and with increased cough. She went to work as a  and when she came up she had an acute episode of rigors that was uncontrollable and associated with SOB and "gasping" for air. She additionally reports increased SOB from her baseline and has NOT been on her COPD controller medications for the past month because they "didn't do anything". She continues to smoke. She is not prescribed home O2, but will occassionally use her 's at night. She endorses R upper back pain, which she attributes to continued coughing. This is a chronic problem for her and has improved s/p pain medications. This pain is reproducible with palpation. She denies any CP currently, she denies CP during exertion. She does report RATLIFF, but this is chronic and unchanged. Her brother  at age 52 from an MI, her father has a pacemarker. She denies history of HLD, CAD, HTN, DM. She denies any urinary complaints, flank pain, abdominal pain, hot joints, LE claudication or swelling.    Past Medical History:   Diagnosis Date    Abnormal Pap smear     Hyst    Anemia     Asthma     " Cervical cancer     COPD (chronic obstructive pulmonary disease)     STD (sexually transmitted disease)        Past Surgical History:   Procedure Laterality Date    APPENDECTOMY       SECTION      x1    HYSTERECTOMY      LAVERN/LSO (Abn Pap)    OOPHORECTOMY      Vocal chord nodules removed         Review of patient's allergies indicates:   Allergen Reactions    Penicillins Rash       No current facility-administered medications on file prior to encounter.      Current Outpatient Prescriptions on File Prior to Encounter   Medication Sig    fluticasone-salmeterol 100-50 mcg/dose (ADVAIR DISKUS) 100-50 mcg/dose diskus inhaler INHALE 1 PUFF INTO THE LUNGS   2 (TWO) TIMES DAILY.    ipratropium-albuterol (COMBIVENT RESPIMAT)  mcg/actuation inhaler INHALE 1 PUFF INTO THE LUNGS  4 (FOUR) TIMES DAILY.     Family History     Problem Relation (Age of Onset)    Cancer Paternal Aunt    Heart disease Sister, Brother    Hypertension Father, Mother        Social History Main Topics    Smoking status: Current Some Day Smoker     Packs/day: 1.00     Types: Cigarettes    Smokeless tobacco: Never Used    Alcohol use No    Drug use: Unknown    Sexual activity: Yes     Partners: Male     Birth control/ protection: Post-menopausal     Review of Systems   Constitutional: Positive for chills, fatigue and fever.   HENT: Negative for rhinorrhea and sore throat.    Respiratory: Positive for cough, shortness of breath and wheezing. Negative for chest tightness.    Cardiovascular: Negative for chest pain, palpitations and leg swelling.   Gastrointestinal: Negative for abdominal pain, blood in stool, diarrhea, nausea and vomiting.   Genitourinary: Negative for difficulty urinating, dysuria, flank pain and urgency.   Musculoskeletal: Positive for back pain (upper, right). Negative for arthralgias and myalgias.   Skin: Negative for rash and wound.   Neurological: Positive for weakness. Negative for dizziness, syncope,  light-headedness and numbness.   Psychiatric/Behavioral: Negative for agitation and confusion.     Objective:     Vital Signs (Most Recent):  Temp: 98.3 °F (36.8 °C) (01/25/18 0024)  Pulse: 79 (01/25/18 0024)  Resp: 20 (01/25/18 0024)  BP: 106/62 (01/25/18 0024)  SpO2: (!) 89 % (01/25/18 0024) Vital Signs (24h Range):  Temp:  [98.3 °F (36.8 °C)-98.9 °F (37.2 °C)] 98.3 °F (36.8 °C)  Pulse:  [] 79  Resp:  [16-20] 20  SpO2:  [89 %-97 %] 89 %  BP: (106-150)/(62-84) 106/62     Weight: 68.8 kg (151 lb 10.8 oz)  Body mass index is 26.87 kg/m².    Physical Exam   Constitutional: She is oriented to person, place, and time. She appears well-developed and well-nourished. No distress.   HENT:   Head: Normocephalic and atraumatic.   Eyes: EOM are normal. Pupils are equal, round, and reactive to light.   Neck: Normal range of motion. Neck supple.   Cardiovascular: Normal rate and regular rhythm.    No murmur heard.  Pulmonary/Chest: No accessory muscle usage. No tachypnea. She is in respiratory distress. She has decreased breath sounds in the right middle field, the right lower field, the left middle field and the left lower field. She has wheezes.           Decreased BS BL bases, mild expiratory wheeze   Abdominal: Soft. Bowel sounds are normal. She exhibits no distension.   Musculoskeletal: Normal range of motion. She exhibits no edema.   Neurological: She is alert and oriented to person, place, and time. No cranial nerve deficit.   Skin: She is not diaphoretic.   Psychiatric: She has a normal mood and affect. Her behavior is normal. Judgment and thought content normal.   Nursing note and vitals reviewed.        CRANIAL NERVES     CN III, IV, VI   Pupils are equal, round, and reactive to light.  Extraocular motions are normal.        Significant Labs:   A1C:   Recent Labs  Lab 01/24/18  1805   HGBA1C 5.2     Blood Culture: No results for input(s): LABBLOO in the last 48 hours.  CBC:   Recent Labs  Lab 01/24/18  1805   WBC  6.76   HGB 13.8   HCT 41.3   *     CMP:   Recent Labs  Lab 01/24/18  1805      K 4.1      CO2 27      BUN 11   CREATININE 0.7   CALCIUM 9.2   PROT 7.3   ALBUMIN 3.7   BILITOT 1.2*   ALKPHOS 153*   AST 32   ALT 31   ANIONGAP 8   EGFRNONAA >60.0     Cardiac Markers:   Recent Labs  Lab 01/24/18  2158   BNP 47     Coagulation: No results for input(s): PT, INR, APTT in the last 48 hours.  Lactic Acid:   Recent Labs  Lab 01/24/18  1909   LACTATE 2.5*     Lipase: No results for input(s): LIPASE in the last 48 hours.  Lipid Panel: No results for input(s): CHOL, HDL, LDLCALC, TRIG, CHOLHDL in the last 48 hours.  Respiratory Culture: No results for input(s): GSRESP, RESPIRATORYC in the last 48 hours.  Troponin:   Recent Labs  Lab 01/24/18 1909   TROPONINI 0.499*     TSH: No results for input(s): TSH in the last 4320 hours.  Urine Culture: No results for input(s): LABURIN in the last 48 hours.  Urine Studies:   Recent Labs  Lab 01/24/18  1931   COLORU Jennifer   APPEARANCEUA Cloudy*   PHUR 5.0   SPECGRAV 1.020   PROTEINUA 2+*   GLUCUA Negative   KETONESU Negative   BILIRUBINUA Negative   OCCULTUA 2+*   NITRITE Positive*   UROBILINOGEN 2.0   LEUKOCYTESUR 2+*   RBCUA 24*   WBCUA 32*   BACTERIA Moderate*   SQUAMEPITHEL 0   HYALINECASTS 2*       Significant Imaging: CT: I have reviewed all pertinent results/findings within the past 24 hours and my personal findings are:  no PE  CXR: I have reviewed all pertinent results/findings within the past 24 hours and my personal findings are:  no PNA  EKG: I have reviewed all pertinent results/findings within the past 24 hours and my personal findings are: no ischemic changes    Assessment/Plan:     * COPD exacerbation    58F with COPD, tobacco use no ton controllers x 1 month who presents with increased SOB, wheeze, fever, rigors.    - COPD pathway initiated, continue scheduled nebs, prednisone, controllers, PT/OT  - will start on CTX for UTI, consider adding  azithromycin for atypical coverage given procal elevation and hypoxia if clinical picture worsens  - on RA at home, wean to 88-92%        Elevated troponin    - likely elevated in setting of infection, 0.499>  - trend, treat UTI  - no CP, no EKG changes suspicious for ischemia  - cardiology consulted with appreciation  - pending 2D echo for eval of WMA  - check risk factors: lipid panel, A1c        Elevated d-dimer    - unclear significance or etiology  - CTA negative for PE  - ?sepsis        Acute cystitis    - sepsis is known cause of increased procal, d-dimer, and troponin, however, elevations are rather impressive given UTI is only source of infection, patient possibly bacteremic given history and lab results  - for now continue CTX, follow UCx and BCx  - 1/4 SIRS for HR >90        Acute right-sided thoracic back pain    - associated with increased muscle tone, pain reproducible with palpation or mm  - do not suspect back pain is ACS        Smoker    - cessation per respiratory, nicotine patch          VTE Risk Mitigation         Ordered     enoxaparin injection 40 mg  Daily     Route:  Subcutaneous        01/25/18 0008     Medium Risk of VTE  Once      01/24/18 2128             Hermelindo Lott PA-C  Department of Hospital Medicine   [facility]

## 2018-01-25 NOTE — PT/OT/SLP EVAL
Physical Therapy Evaluation and Discharge Note    Patient Name:  Cassia Kelly   MRN:  [id number]    Recommendations:     Discharge Recommendations:  home   Discharge Equipment Recommendations: none   Barriers to discharge: None    Assessment:     Cassia Kelly is a 58 y.o. female admitted with a medical diagnosis of COPD exacerbation. .  At this time, patient is functioning at their prior level of function and does not require further acute PT services.     Recent Surgery: * No surgery found *      Plan:     During this hospitalization, patient does not require further acute PT services.  Please re-consult if situation changes.     Plan of Care Reviewed with: patient    Subjective     Communicated with RN prior to session.  Patient found supine upon PT entry to room, agreeable to evaluation.      Chief Complaint: SOB  Patient comments/goals: return home  Pain/Comfort:  · Pain Rating 1: 0/10  · Pain Rating Post-Intervention 1: 0/10    Patients cultural, spiritual, Anabaptist conflicts given the current situation: none stated    Living Environment:  Patient lives with  and mother in 2-story home 1 threshold [address]. Bed/bath on first floor. 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  Patient has the following equipment: none.  DME owned (not currently used): none.  Upon discharge, patient will have assistance from . Reports using 's oxygen when needed    Objective:     Patient found with:  (no lines)     General Precautions: Standard, fall   Orthopedic Precautions:N/A   Braces: N/A     Exams:  · Cognitive Exam:  Patient is oriented to Person, Place, Time and Situation and follows 100% of multi-step commands   · Gross Motor Coordination:  WFL  · Postural Exam:  Patient presented with the following abnormalities: -       Forward head  · Sensation: -       Intact  · RLE ROM: WNL  · RLE Strength:  WNL  · LLE ROM: WNL  · LLE Strength: WNL    Functional Mobility:  · Indep with all functional mobility including stair negotiation without hand rails with reciprocal pattern    AM-PAC 6 CLICK MOBILITY  Total Score:24       Therapeutic Activities and Exercises:  Co-evaluation with OT.     Patient educated on:   - role of PT/POC    - monitoring symptoms of SOB   - safety with all functional mobility   - importance of maintaining Indep during hospital stay by moving around room   - safe to ambulate in room and on floor as needed to tolerance    Verbalized understanding of all education provided.      Patient left seated EOB with all lines intact, call button in reach, RN notified and ot present.    GOALS:    Physical Therapy Goals     Not on file                History:     Past Medical History:   Diagnosis Date    Abnormal Pap smear     Hyst    Anemia     Asthma     Cervical cancer     COPD (chronic obstructive pulmonary disease)     STD (sexually transmitted disease)        Past Surgical History:   Procedure Laterality Date    APPENDECTOMY       SECTION      x1    HYSTERECTOMY      LAVERN/LSO (Abn Pap)    OOPHORECTOMY      Vocal chord nodules removed         Clinical Decision Making:     History  Co-morbidities and personal factors that may impact the plan of care Examination  Body Structures and Functions, activity limitations and participation restrictions that may impact the plan of care Clinical Presentation   Decision Making/ Complexity Score   Co-morbidities:   [] Time since onset of injury / illness / exacerbation  [] Status of current condition  []Patient's cognitive status and safety concerns    [x] Multiple Medical Problems (see med hx)  Personal Factors:   [] Patient's age  [] Prior Level of function   [] Patient's home situation (environment and family support)  [] Patient's level of motivation  [] Expected progression of patient      HISTORY:(criteria)    [] 31463 - no personal  factors/history    [x] 49709 - has 1-2 personal factor/comorbidity     [] 34932 - has >3 personal factor/comorbidity     Body Regions:  [] Objective examination findings  [] Head     []  Neck  [] Trunk   [] Upper Extremity  [] Lower Extremity    Body Systems:  [] For communication ability, affect, cognition, language, and learning style: the assessment of the ability to make needs known, consciousness, orientation (person, place, and time), expected emotional /behavioral responses, and learning preferences (eg, learning barriers, education  needs)  [] For the neuromuscular system: a general assessment of gross coordinated movement (eg, balance, gait, locomotion, transfers, and transitions) and motor function  (motor control and motor learning)  [] For the musculoskeletal system: the assessment of gross symmetry, gross range of motion, gross strength, height, and weight  [] For the integumentary system: the assessment of pliability(texture), presence of scar formation, skin color, and skin integrity  [x] For cardiovascular/pulmonary system: the assessment of heart rate, respiratory rate, blood pressure, and edema     Activity limitations:    [] Patient's cognitive status and saf ety concerns          [] Status of current condition      [] Weight bearing restriction  [] Cardiopulmunary Restriction    Participation Restrictions:   [] Goals and goal agreement with the patient     [] Rehab potential (prognosis) and probable outcome      Examination of Body System: (criteria)    [x] 01778 - addressing 1-2 elements    [] 23274 - addressing a total of 3 or more elements     [] 20725 -  Addressing a total of 4 or more elements         Clinical Presentation: (criteria)  Stable - 87122     On examination of body system using standardized tests and measures patient presents with 1-2 elements from any of the following: body structures and functions, activity limitations, and/or participation restrictions.  Leading to a clinical  presentation that is considered stable and/or uncomplicated                              Clinical Decision Making  (Eval Complexity):  Low- 76793     Time Tracking:     PT Received On: 01/25/18  PT Start Time: 1406     PT Stop Time: 1414  PT Total Time (min): 8 min     Billable Minutes: Evaluation 8      Kwan Gutierrez III, PT  01/25/2018

## 2018-01-25 NOTE — PLAN OF CARE
Problem: Chronic Obstructive Pulmonary Disease (Adult)  Goal: Signs and Symptoms of Listed Potential Problems Will be Absent, Minimized or Managed (Chronic Obstructive Pulmonary Disease)  Signs and symptoms of listed potential problems will be absent, minimized or managed by discharge/transition of care (reference Chronic Obstructive Pulmonary Disease (Adult) CPG).   Outcome: Ongoing (interventions implemented as appropriate)  Provided teaching regarding dx copd exac, O2 and nebulizers, smoking cessation. Pt verbalized understanduing of POC.

## 2018-01-25 NOTE — ASSESSMENT & PLAN NOTE
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    - COPD pathway initiated, continue scheduled nebs, prednisone, controllers, PT/OT  - will start on CTX for UTI, consider adding azithromycin for atypical coverage given procal elevation and hypoxia if clinical picture worsens  - on RA at home, wean to 88-92%

## 2018-01-25 NOTE — ASSESSMENT & PLAN NOTE
-Low suspicion for ACS, PE and dissection ruled out  -Patient has no chest pain  -continue to trend troponin till flat or downtrending  -EKG without ischemic changes, No e/o pericarditis  -Echo in am  -Once patient stable from COPD exacerbation perspective, stress test which can be done as outpatient

## 2018-01-25 NOTE — ASSESSMENT & PLAN NOTE
- sepsis is known cause of increased procal, d-dimer, and troponin, however, elevations are rather impressive given UTI is only source of infection, patient possibly bacteremic given history and lab results  - for now continue CTX, follow UCx and BCx  - 1/4 SIRS for HR >90

## 2018-01-25 NOTE — PLAN OF CARE
01/25/18 1030   Discharge Assessment   Assessment Type Discharge Planning Assessment   Confirmed/corrected address and phone number on facesheet? Yes   Assessment information obtained from? Patient   Expected Length of Stay (days) 3   Communicated expected length of stay with patient/caregiver yes   Prior to hospitilization cognitive status: Alert/Oriented   Prior to hospitalization functional status: Independent   Current cognitive status: Alert/Oriented   Current Functional Status: Independent   Lives With parent(s);spouse  (spouse, Rey Kelly ([phone]))   Able to Return to Prior Arrangements yes   Is patient able to care for self after discharge? Yes   Patient's perception of discharge disposition home or selfcare   Readmission Within The Last 30 Days no previous admission in last 30 days   Patient currently being followed by outpatient case management? No   Patient currently receives any other outside agency services? No   Equipment Currently Used at Home none   Do you have any problems affording any of your prescribed medications? No   Is the patient taking medications as prescribed? yes   Does the patient have transportation home? Yes   Transportation Available family or friend will provide  (daughter, Noy Baez ([phone]))   Does the patient receive services at the [facility]? No   Discharge Plan A Home with family   Discharge Plan B Home Health   Patient/Family In Agreement With Plan yes     Patient resting quietly in bed when CM rounded. No family present. Patient was admitted with COPD exacerbation. Pox 94% on 1L O2 via NC this AM. Patient lives with her spouse, Rey Kelly, & is independent of all ADLs. Plan to discharge patient home with support or home with home health when medically stable. Patient stated that her daughter, Noy Baez, will provide transportation at time of discharge. Previously scheduled appointment with Dr. Rondon (PCP) on 1/31/18 at 1300 noted. Will  continue to follow.

## 2018-01-25 NOTE — HOSPITAL COURSE
Patient admitted to observation for evaluation of fever. Intake labs remarkable for troponin 0.980->0.501, procalcitonin 22, UA with UTI. Started on Ceftriaxone. Cardiology consulted, not concerned for ACS given no CP or SOB, likely due to infection. Given extensive pulmonary disease, will treat patient with tamiflu as well (fevers, myalgias). Urine cultures growing Ecoli >100k, susceptibilities to Doxycycline. Patient discharged with home antibiotics and tamiflu.

## 2018-01-25 NOTE — PT/OT/SLP EVAL
Occupational Therapy   Evaluation and Discharge Note    Name: Cassia Kelly  MRN: [id number]  Admitting Diagnosis:  COPD exacerbation      Recommendations:     Discharge Recommendations: home  Discharge Equipment Recommendations:  none  Barriers to discharge:  None    History:     Occupational Profile:  Living Environment: Patient lives with  and mother in 2-story home 1 threshold [address]. Bed/bath on first floor. 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  Patient has the following equipment: none.  DME owned (not currently used): none.  Upon discharge, patient will have assistance from . Reports using 's oxygen when needed      Past Medical History:   Diagnosis Date    Abnormal Pap smear     Hyst    Anemia     Asthma     Cervical cancer     COPD (chronic obstructive pulmonary disease)     STD (sexually transmitted disease)        Past Surgical History:   Procedure Laterality Date    APPENDECTOMY       SECTION      x1    HYSTERECTOMY      LAVERN/LSO (Abn Pap)    OOPHORECTOMY      Vocal chord nodules removed         Subjective     Chief Complaint: None stated  Patient/Family stated goals: Return home  Communicated with: RN prior to session.  Pain/Comfort:  · Pain Rating 1: 0/10  · Pain Rating Post-Intervention 1: 0/10    Patients cultural, spiritual, Adventist conflicts given the current situation: None    Objective:     Patient found with: telemetry    General Precautions: Standard, fall   Orthopedic Precautions:N/A   Braces: N/A     Occupational Performance:    Bed Mobility:    · Patient completed Supine to Sit with independence    Functional Mobility/Transfers:  · Patient completed Sit <> Stand Transfer with independence  with  no assistive device   · Functional Mobility: (I) within room and hallway without AD    Activities of Daily Living:  · UB Dressing: independence   "  · LB Dressing: independence      Cognitive/Visual Perceptual:  Cognitive/Psychosocial Skills:     -       Oriented to: Person, Place, Time and Situation   -       Follows Commands/attention:Follows multistep  commands  -       Safety awareness/insight to disability: intact     Physical Exam:  Pt demo WFL B UE ROM/MMT, fine motor coordination, balance    Patient left seated EOB with all lines intact and call button in reach    AMPAC 6 Click:  AMPA Total Score: 24    Treatment & Education:  OT/PT eval; educated on OT role and POC including no further need for acute OT; white board updated  Education:    Assessment:     Cassia Kelly is a 58 y.o. female with a medical diagnosis of COPD exacerbation. At this time, patient is functioning at their prior level of function and does not require further acute OT services.     Clinical Decision Makin.  OT Low:  "Pt evaluation falls under low complexity for evaluation coding due to performance deficits noted in 1-3 areas as stated above and 0 co-morbities affecting current functional status. Data obtained from problem focused assessments. No modifications or assistance was required for completion of evaluation. Only brief occupational profile and history review completed."     Plan:     During this hospitalization, patient does not require further acute OT services.  Please re-consult if situation changes.    · Plan of Care Reviewed with: patient    This Plan of care has been discussed with the patient who was involved in its development and understands and is in agreement with the identified goals and treatment plan    GOALS:    Occupational Therapy Goals     Not on file          Multidisciplinary Problems (Resolved)        Problem: Occupational Therapy Goal    Goal Priority Disciplines Outcome Interventions   Occupational Therapy Goal   (Resolved)     OT, PT/OT Outcome(s) achieved                    Time Tracking:     OT Date of Treatment: 18  OT Start " Time: 1406  OT Stop Time: 1414  OT Total Time (min): 8 min    Billable Minutes:Evaluation 8 minutes    MADHU Cade  1/25/2018

## 2018-01-25 NOTE — ASSESSMENT & PLAN NOTE
- associated with increased muscle tone, pain reproducible with palpation or mm  - do not suspect back pain is ACS

## 2018-01-25 NOTE — SUBJECTIVE & OBJECTIVE
Past Medical History:   Diagnosis Date    Abnormal Pap smear     Hyst    Anemia     Asthma     Cervical cancer     COPD (chronic obstructive pulmonary disease)     STD (sexually transmitted disease)        Past Surgical History:   Procedure Laterality Date    APPENDECTOMY       SECTION      x1    HYSTERECTOMY      LAVERN/LSO (Abn Pap)    OOPHORECTOMY      Vocal chord nodules removed         Review of patient's allergies indicates:   Allergen Reactions    Penicillins Rash       No current facility-administered medications on file prior to encounter.      Current Outpatient Prescriptions on File Prior to Encounter   Medication Sig    ipratropium-albuterol (COMBIVENT RESPIMAT)  mcg/actuation inhaler INHALE 1 PUFF INTO THE LUNGS  4 (FOUR) TIMES DAILY.    fluticasone-salmeterol 100-50 mcg/dose (ADVAIR DISKUS) 100-50 mcg/dose diskus inhaler INHALE 1 PUFF INTO THE LUNGS   2 (TWO) TIMES DAILY.     Family History     Problem Relation (Age of Onset)    Cancer Paternal Aunt    Heart disease Sister, Brother    Hypertension Father, Mother        Social History Main Topics    Smoking status: Current Some Day Smoker     Packs/day: 1.00     Types: Cigarettes    Smokeless tobacco: Never Used    Alcohol use No    Drug use: Unknown    Sexual activity: Yes     Partners: Male     Birth control/ protection: Post-menopausal     Review of Systems   All other systems reviewed and are negative.    Objective:     Vital Signs (Most Recent):  Temp: 98.3 °F (36.8 °C) (18 0024)  Pulse: 80 (18 0315)  Resp: 16 (18 0309)  BP: 106/62 (18 0024)  SpO2: (!) 94 % (18 0315) Vital Signs (24h Range):  Temp:  [98.3 °F (36.8 °C)-98.9 °F (37.2 °C)] 98.3 °F (36.8 °C)  Pulse:  [] 80  Resp:  [16-20] 16  SpO2:  [89 %-97 %] 94 %  BP: (106-150)/(62-84) 106/62     Weight: 68.8 kg (151 lb 10.8 oz)  Body mass index is 26.87 kg/m².    SpO2: (!) 94 %  O2 Device (Oxygen Therapy): nasal cannula    No  intake or output data in the 24 hours ending 01/25/18 0545    Lines/Drains/Airways     Peripheral Intravenous Line                 Peripheral IV - Single Lumen 01/24/18 1805 Left Forearm less than 1 day                Physical Exam   General: alert, awake and oriented x 3  Eyes:PERRL.   Neck:no JVD   Lungs:  Decreased air entry b/l   Cardiovascular: Heart: regular rate and rhythm, S1, S2 normal, no murmur, click, rub or gallop.   Chest Wall: no tenderness.   Extremities: no cyanosis or edema.   Pulses: 2+ and symmetric.  Abdomen/Rectal: Abdomen: soft, non-tender non-distented; bowel sounds normal  Skin: No rashes or lesions  Neurologic: Normal strength and tone. No focal numbness or weakness  Psych/Behavioral:  Normal.      Significant Labs:   CMP   Recent Labs  Lab 01/24/18  1805      K 4.1      CO2 27      BUN 11   CREATININE 0.7   CALCIUM 9.2   PROT 7.3   ALBUMIN 3.7   BILITOT 1.2*   ALKPHOS 153*   AST 32   ALT 31   ANIONGAP 8   ESTGFRAFRICA >60.0   EGFRNONAA >60.0    and CBC   Recent Labs  Lab 01/24/18  1805   WBC 6.76   HGB 13.8   HCT 41.3   *       Significant Imaging: Echocardiogram: 2D echo with color flow doppler: No results found for this or any previous visit.

## 2018-01-25 NOTE — CONSULTS
[facility]  Cardiology  Consult Note    Patient Name: Cassia Kelly  MRN: [id number]  Admission Date: 2018  Hospital Length of Stay: 0 days  Code Status: Full Code   Attending Provider: Wandy Quan MD   Consulting Provider: Carmel Payton MD  Primary Care Physician: Marco A Rondon MD  Principal Problem:COPD exacerbation    Patient information was obtained from patient, past medical records and ER records.     Inpatient consult to Cardiology  Consult performed by: CARMEL PAYTON  Consult ordered by: LUCIANO KNUTSON        Subjective:     Chief Complaint:  SOB     HPI:   58 year old female with h/o COPD, continued tobacco use(1PPD) who presented to ED with chief complaint of SOB, Fever and chills. She also complains of cough.Yesterday she had a fever of 101.2 F.She has chronic SOB and RATLIFF but this was worse and prompted her to come to ED. She denies any chest pain but does have right upper back pain that is reproducible on palpation -this has been going on for many months.She doesnot have oxygen at home for herself but sometimes uses her 's oxygen.She denies any dizziness, syncope, palpitations.She had W/U done in ED that showed elevated troponin of 0.5, D-dimer at 8.5, elevated procalcitonin, CRP.CTA chest was negative for PE or dissection.EKG-NSR with no sichemic changes.Cardiology consulted for elevated troponin.          Past Medical History:   Diagnosis Date    Abnormal Pap smear     Hyst    Anemia     Asthma     Cervical cancer     COPD (chronic obstructive pulmonary disease)     STD (sexually transmitted disease)        Past Surgical History:   Procedure Laterality Date    APPENDECTOMY       SECTION      x1    HYSTERECTOMY      LAVERN/LSO (Abn Pap)    OOPHORECTOMY      Vocal chord nodules removed         Review of patient's allergies indicates:   Allergen Reactions    Penicillins Rash       No current facility-administered medications on file  prior to encounter.      Current Outpatient Prescriptions on File Prior to Encounter   Medication Sig    ipratropium-albuterol (COMBIVENT RESPIMAT)  mcg/actuation inhaler INHALE 1 PUFF INTO THE LUNGS  4 (FOUR) TIMES DAILY.    fluticasone-salmeterol 100-50 mcg/dose (ADVAIR DISKUS) 100-50 mcg/dose diskus inhaler INHALE 1 PUFF INTO THE LUNGS   2 (TWO) TIMES DAILY.     Family History     Problem Relation (Age of Onset)    Cancer Paternal Aunt    Heart disease Sister, Brother    Hypertension Father, Mother        Social History Main Topics    Smoking status: Current Some Day Smoker     Packs/day: 1.00     Types: Cigarettes    Smokeless tobacco: Never Used    Alcohol use No    Drug use: Unknown    Sexual activity: Yes     Partners: Male     Birth control/ protection: Post-menopausal     Review of Systems   All other systems reviewed and are negative.    Objective:     Vital Signs (Most Recent):  Temp: 98.3 °F (36.8 °C) (01/25/18 0024)  Pulse: 80 (01/25/18 0315)  Resp: 16 (01/25/18 0309)  BP: 106/62 (01/25/18 0024)  SpO2: (!) 94 % (01/25/18 0315) Vital Signs (24h Range):  Temp:  [98.3 °F (36.8 °C)-98.9 °F (37.2 °C)] 98.3 °F (36.8 °C)  Pulse:  [] 80  Resp:  [16-20] 16  SpO2:  [89 %-97 %] 94 %  BP: (106-150)/(62-84) 106/62     Weight: 68.8 kg (151 lb 10.8 oz)  Body mass index is 26.87 kg/m².    SpO2: (!) 94 %  O2 Device (Oxygen Therapy): nasal cannula    No intake or output data in the 24 hours ending 01/25/18 0545    Lines/Drains/Airways     Peripheral Intravenous Line                 Peripheral IV - Single Lumen 01/24/18 1805 Left Forearm less than 1 day                Physical Exam   General: alert, awake and oriented x 3  Eyes:PERRL.   Neck:no JVD   Lungs:  Decreased air entry b/l   Cardiovascular: Heart: regular rate and rhythm, S1, S2 normal, no murmur, click, rub or gallop.   Chest Wall: no tenderness.   Extremities: no cyanosis or edema.   Pulses: 2+ and symmetric.  Abdomen/Rectal: Abdomen: soft,  non-tender non-distented; bowel sounds normal  Skin: No rashes or lesions  Neurologic: Normal strength and tone. No focal numbness or weakness  Psych/Behavioral:  Normal.      Significant Labs:   CMP   Recent Labs  Lab 01/24/18  1805      K 4.1      CO2 27      BUN 11   CREATININE 0.7   CALCIUM 9.2   PROT 7.3   ALBUMIN 3.7   BILITOT 1.2*   ALKPHOS 153*   AST 32   ALT 31   ANIONGAP 8   ESTGFRAFRICA >60.0   EGFRNONAA >60.0    and CBC   Recent Labs  Lab 01/24/18  1805   WBC 6.76   HGB 13.8   HCT 41.3   *       Significant Imaging: Echocardiogram: 2D echo with color flow doppler: No results found for this or any previous visit.    Assessment and Plan:     Elevated troponin    -Low suspicion for ACS, PE and dissection ruled out  -Patient has no chest pain  -continue to trend troponin till flat or downtrending  -EKG without ischemic changes, No e/o pericarditis  -Echo in am  -Once patient stable from COPD exacerbation perspective, stress test which can be done as outpatient        Discussed with Dr Mae    VTE Risk Mitigation         Ordered     enoxaparin injection 40 mg  Daily     Route:  Subcutaneous        01/25/18 0008     Medium Risk of VTE  Once      01/24/18 2128          Thank you for your consult. Cardiology will follow up    Carlton Payton MD  Cardiology   [facility]

## 2018-01-25 NOTE — ED NOTES
Pt identifiers Cassia Kelly were checked and are correct  LOC: The patient is awake, alert, aware of environment with an appropriate affect. Oriented x3, speaking appropriately  APPEARANCE: Pt rates upper back a 5/10 , in no acute distress, pt is clean and well groomed, clothing properly fastenePt has O2 at 3L/min per nasal cannula in progress  SKIN: Skin warm, dry and intact, normal skin turgor, dry  mucus membranes  RESPIRATORY: Resp moderate dept, Decreased breath sounds noted to RUL, RLL , LLL Clear to AISHA   CARDIAC: Normal rate and rhythm, no peripheral edema noted, capillary refill < 3 seconds, bilateral radial pulses 2+  ABDOMEN: Soft, nontender, nondistended. Bowel sounds present to all four quad of abd on auscultation  NEUROLOGIC:  facial expression is symmetrical, patient moving all extremities spontaneously, normal sensation in all extremities when touched with a finger.  Follows all commands appropriately  MUSCULOSKELETAL: No obvious deformities.

## 2018-01-25 NOTE — HPI
58 year old female with h/o COPD, continued tobacco use(1PPD) who presented to ED with chief complaint of SOB, Fever and chills. She also complains of cough.Yesterday she had a fever of 101.2 F.She has chronic SOB and RATLIFF but this was worse and prompted her to come to ED. She denies any chest pain but does have right upper back pain that is reproducible on palpation -this has been going on for many months.She doesnot have oxygen at home for herself but sometimes uses her 's oxygen.She denies any dizziness, syncope, palpitations.She had W/U done in ED that showed elevated troponin of 0.5, D-dimer at 8.5, elevated procalcitonin, CRP.CTA chest was negative for PE or dissection.EKG-NSR with no sichemic changes.Cardiology consulted for elevated troponin.

## 2018-01-25 NOTE — ASSESSMENT & PLAN NOTE
- likely elevated in setting of infection, 0.499>  - trend, treat UTI  - no CP, no EKG changes suspicious for ischemia  - cardiology consulted with appreciation  - pending 2D echo for eval of WMA  - check risk factors: lipid panel, A1c

## 2018-01-26 LAB
ALBUMIN SERPL BCP-MCNC: 3.1 G/DL
ALP SERPL-CCNC: 109 U/L
ALT SERPL W/O P-5'-P-CCNC: 33 U/L
ANION GAP SERPL CALC-SCNC: 11 MMOL/L
AST SERPL-CCNC: 24 U/L
BASOPHILS # BLD AUTO: 0.01 K/UL
BASOPHILS NFR BLD: 0.1 %
BILIRUB SERPL-MCNC: 0.2 MG/DL
BUN SERPL-MCNC: 17 MG/DL
CALCIUM SERPL-MCNC: 8.9 MG/DL
CHLORIDE SERPL-SCNC: 106 MMOL/L
CO2 SERPL-SCNC: 26 MMOL/L
CREAT SERPL-MCNC: 0.6 MG/DL
DIFFERENTIAL METHOD: ABNORMAL
EOSINOPHIL # BLD AUTO: 0 K/UL
EOSINOPHIL NFR BLD: 0.3 %
ERYTHROCYTE [DISTWIDTH] IN BLOOD BY AUTOMATED COUNT: 12.6 %
EST. GFR  (AFRICAN AMERICAN): >60 ML/MIN/1.73 M^2
EST. GFR  (NON AFRICAN AMERICAN): >60 ML/MIN/1.73 M^2
GLUCOSE SERPL-MCNC: 102 MG/DL
HCT VFR BLD AUTO: 33.7 %
HGB BLD-MCNC: 11.2 G/DL
IMM GRANULOCYTES # BLD AUTO: 0.03 K/UL
IMM GRANULOCYTES NFR BLD AUTO: 0.3 %
LYMPHOCYTES # BLD AUTO: 1.8 K/UL
LYMPHOCYTES NFR BLD: 17 %
MCH RBC QN AUTO: 31.5 PG
MCHC RBC AUTO-ENTMCNC: 33.2 G/DL
MCV RBC AUTO: 95 FL
MONOCYTES # BLD AUTO: 0.7 K/UL
MONOCYTES NFR BLD: 7.1 %
NEUTROPHILS # BLD AUTO: 7.8 K/UL
NEUTROPHILS NFR BLD: 75.2 %
NRBC BLD-RTO: 0 /100 WBC
PLATELET # BLD AUTO: 152 K/UL
PMV BLD AUTO: 12.1 FL
POCT GLUCOSE: 126 MG/DL (ref 70–110)
POCT GLUCOSE: 146 MG/DL (ref 70–110)
POCT GLUCOSE: 78 MG/DL (ref 70–110)
POTASSIUM SERPL-SCNC: 3.3 MMOL/L
PROT SERPL-MCNC: 6.4 G/DL
RBC # BLD AUTO: 3.56 M/UL
SODIUM SERPL-SCNC: 143 MMOL/L
WBC # BLD AUTO: 10.33 K/UL

## 2018-01-26 PROCEDURE — G0378 HOSPITAL OBSERVATION PER HR: HCPCS

## 2018-01-26 PROCEDURE — 25000003 PHARM REV CODE 250: Performed by: PHYSICIAN ASSISTANT

## 2018-01-26 PROCEDURE — 63600175 PHARM REV CODE 636 W HCPCS: Performed by: PHYSICIAN ASSISTANT

## 2018-01-26 PROCEDURE — 94761 N-INVAS EAR/PLS OXIMETRY MLT: CPT

## 2018-01-26 PROCEDURE — 85025 COMPLETE CBC W/AUTO DIFF WBC: CPT

## 2018-01-26 PROCEDURE — 99226 PR SUBSEQUENT OBSERVATION CARE,LEVEL III: CPT | Mod: ,,, | Performed by: PHYSICIAN ASSISTANT

## 2018-01-26 PROCEDURE — 25000242 PHARM REV CODE 250 ALT 637 W/ HCPCS: Performed by: PHYSICIAN ASSISTANT

## 2018-01-26 PROCEDURE — 80053 COMPREHEN METABOLIC PANEL: CPT

## 2018-01-26 PROCEDURE — S4991 NICOTINE PATCH NONLEGEND: HCPCS | Performed by: PHYSICIAN ASSISTANT

## 2018-01-26 PROCEDURE — 25000003 PHARM REV CODE 250: Performed by: NURSE PRACTITIONER

## 2018-01-26 PROCEDURE — 94640 AIRWAY INHALATION TREATMENT: CPT

## 2018-01-26 PROCEDURE — 36415 COLL VENOUS BLD VENIPUNCTURE: CPT

## 2018-01-26 RX ORDER — MAG HYDROX/ALUMINUM HYD/SIMETH 200-200-20
30 SUSPENSION, ORAL (FINAL DOSE FORM) ORAL EVERY 6 HOURS PRN
Status: DISCONTINUED | OUTPATIENT
Start: 2018-01-26 | End: 2018-01-27 | Stop reason: HOSPADM

## 2018-01-26 RX ADMIN — FLUTICASONE FUROATE AND VILANTEROL TRIFENATATE 1 PUFF: 100; 25 POWDER RESPIRATORY (INHALATION) at 10:01

## 2018-01-26 RX ADMIN — OSELTAMIVIR PHOSPHATE 75 MG: 75 CAPSULE ORAL at 10:01

## 2018-01-26 RX ADMIN — POTASSIUM BICARBONATE 25 MEQ: 25 TABLET, EFFERVESCENT ORAL at 01:01

## 2018-01-26 RX ADMIN — AZITHROMYCIN 250 MG: 250 TABLET, FILM COATED ORAL at 10:01

## 2018-01-26 RX ADMIN — IPRATROPIUM BROMIDE AND ALBUTEROL SULFATE 3 ML: .5; 3 SOLUTION RESPIRATORY (INHALATION) at 04:01

## 2018-01-26 RX ADMIN — OSELTAMIVIR PHOSPHATE 75 MG: 75 CAPSULE ORAL at 01:01

## 2018-01-26 RX ADMIN — IPRATROPIUM BROMIDE AND ALBUTEROL SULFATE 3 ML: .5; 3 SOLUTION RESPIRATORY (INHALATION) at 12:01

## 2018-01-26 RX ADMIN — CEFTRIAXONE SODIUM 1 G: 1 INJECTION, POWDER, FOR SOLUTION INTRAMUSCULAR; INTRAVENOUS at 10:01

## 2018-01-26 RX ADMIN — ASPIRIN 81 MG: 81 TABLET, COATED ORAL at 10:01

## 2018-01-26 RX ADMIN — ENOXAPARIN SODIUM 40 MG: 100 INJECTION SUBCUTANEOUS at 04:01

## 2018-01-26 RX ADMIN — IPRATROPIUM BROMIDE AND ALBUTEROL SULFATE 3 ML: .5; 3 SOLUTION RESPIRATORY (INHALATION) at 08:01

## 2018-01-26 RX ADMIN — ALUMINUM HYDROXIDE, MAGNESIUM HYDROXIDE, AND SIMETHICONE 30 ML: 200; 200; 20 SUSPENSION ORAL at 10:01

## 2018-01-26 RX ADMIN — RAMELTEON 8 MG: 8 TABLET, FILM COATED ORAL at 11:01

## 2018-01-26 RX ADMIN — TIOTROPIUM BROMIDE 18 MCG: 18 CAPSULE ORAL; RESPIRATORY (INHALATION) at 10:01

## 2018-01-26 RX ADMIN — PREDNISONE 40 MG: 20 TABLET ORAL at 10:01

## 2018-01-26 RX ADMIN — IPRATROPIUM BROMIDE AND ALBUTEROL SULFATE 3 ML: .5; 3 SOLUTION RESPIRATORY (INHALATION) at 07:01

## 2018-01-26 RX ADMIN — POTASSIUM BICARBONATE 25 MEQ: 25 TABLET, EFFERVESCENT ORAL at 04:01

## 2018-01-26 RX ADMIN — POLYETHYLENE GLYCOL 3350 17 G: 17 POWDER, FOR SOLUTION ORAL at 10:01

## 2018-01-26 RX ADMIN — NICOTINE 1 PATCH: 14 PATCH, EXTENDED RELEASE TRANSDERMAL at 10:01

## 2018-01-26 NOTE — SUBJECTIVE & OBJECTIVE
Interval History: No events overnight. Patient feeling better today. Will discharge tomorrow with oral antibiotics.    Review of Systems   Constitutional: Positive for chills, fatigue and fever.   HENT: Negative for rhinorrhea and sore throat.    Respiratory: Positive for cough, shortness of breath and wheezing. Negative for chest tightness.    Cardiovascular: Negative for chest pain, palpitations and leg swelling.   Gastrointestinal: Negative for abdominal pain, blood in stool, diarrhea, nausea and vomiting.   Genitourinary: Negative for difficulty urinating, dysuria, flank pain and urgency.   Musculoskeletal: Positive for back pain (upper, right). Negative for arthralgias and myalgias.   Skin: Negative for rash and wound.   Neurological: Positive for weakness. Negative for dizziness, syncope, light-headedness and numbness.   Psychiatric/Behavioral: Negative for agitation and confusion.     Objective:     Vital Signs (Most Recent):  Temp: 97.7 °F (36.5 °C) (01/26/18 1137)  Pulse: 87 (01/26/18 1622)  Resp: 18 (01/26/18 1622)  BP: (!) 121/58 (01/26/18 1137)  SpO2: (!) 94 % (01/26/18 1622) Vital Signs (24h Range):  Temp:  [97.3 °F (36.3 °C)-98.2 °F (36.8 °C)] 97.7 °F (36.5 °C)  Pulse:  [59-90] 87  Resp:  [16-20] 18  SpO2:  [92 %-99 %] 94 %  BP: ()/(55-61) 121/58     Weight: 75.5 kg (166 lb 7.2 oz)  Body mass index is 29.48 kg/m².  No intake or output data in the 24 hours ending 01/26/18 1642   Physical Exam   Constitutional: She is oriented to person, place, and time. She appears well-developed and well-nourished. No distress.   Cardiovascular: Normal rate and regular rhythm.    No murmur heard.  Pulmonary/Chest: No accessory muscle usage. No tachypnea. She is in respiratory distress. She has decreased breath sounds in the right middle field, the right lower field, the left middle field and the left lower field. She has wheezes.           Decreased BS BL bases, mild expiratory wheeze   Abdominal: Soft. Bowel  sounds are normal. She exhibits no distension.   Musculoskeletal: Normal range of motion. She exhibits no edema.   Neurological: She is alert and oriented to person, place, and time. No cranial nerve deficit.   Psychiatric: She has a normal mood and affect. Her behavior is normal. Thought content normal.   Nursing note and vitals reviewed.      Significant Labs:   BMP:   Recent Labs  Lab 01/26/18  0515         K 3.3*      CO2 26   BUN 17   CREATININE 0.6   CALCIUM 8.9     CBC:   Recent Labs  Lab 01/24/18  1805 01/25/18  0633 01/26/18  0515   WBC 6.76 9.97 10.33   HGB 13.8 11.9* 11.2*   HCT 41.3 34.5* 33.7*   * 127* 152     Urine Culture:   Recent Labs  Lab 01/24/18 1931   LABURIN PRESUMPTIVE E COLI>100,000 cfu/mlIdentification and susceptibility pending     Urine Studies:   Recent Labs  Lab 01/24/18 1931   COLORU Jennifer   APPEARANCEUA Cloudy*   PHUR 5.0   SPECGRAV 1.020   PROTEINUA 2+*   GLUCUA Negative   KETONESU Negative   BILIRUBINUA Negative   OCCULTUA 2+*   NITRITE Positive*   UROBILINOGEN 2.0   LEUKOCYTESUR 2+*   RBCUA 24*   WBCUA 32*   BACTERIA Moderate*   SQUAMEPITHEL 0   HYALINECASTS 2*       Significant Imaging: I have reviewed all pertinent imaging results/findings within the past 24 hours.

## 2018-01-26 NOTE — SUBJECTIVE & OBJECTIVE
Interval History: No events overnight. Patient seen by Cardiology and will continue treatment for UTI/COPD.    Review of Systems   Constitutional: Positive for chills, fatigue and fever.   HENT: Negative for rhinorrhea and sore throat.    Respiratory: Positive for cough, shortness of breath and wheezing. Negative for chest tightness.    Cardiovascular: Negative for chest pain, palpitations and leg swelling.   Gastrointestinal: Negative for abdominal pain, blood in stool, diarrhea, nausea and vomiting.   Genitourinary: Negative for difficulty urinating, dysuria, flank pain and urgency.   Musculoskeletal: Positive for back pain (upper, right). Negative for arthralgias and myalgias.   Skin: Negative for rash and wound.   Neurological: Positive for weakness. Negative for dizziness, syncope, light-headedness and numbness.   Psychiatric/Behavioral: Negative for agitation and confusion.     Objective:     Vital Signs (Most Recent):  Temp: 97.3 °F (36.3 °C) (01/25/18 1939)  Pulse: 79 (01/25/18 1939)  Resp: 18 (01/25/18 1939)  BP: (!) 115/59 (01/25/18 1939)  SpO2: 99 % (01/25/18 1939) Vital Signs (24h Range):  Temp:  [97.3 °F (36.3 °C)-98.3 °F (36.8 °C)] 97.3 °F (36.3 °C)  Pulse:  [] 79  Resp:  [16-20] 18  SpO2:  [89 %-99 %] 99 %  BP: (106-123)/(59-84) 115/59     Weight: 68.8 kg (151 lb 10.8 oz)  Body mass index is 26.87 kg/m².  No intake or output data in the 24 hours ending 01/25/18 2028   Physical Exam   Constitutional: She is oriented to person, place, and time. She appears well-developed and well-nourished. No distress.   Cardiovascular: Normal rate and regular rhythm.    No murmur heard.  Pulmonary/Chest: No accessory muscle usage. No tachypnea. She is in respiratory distress. She has decreased breath sounds in the right middle field, the right lower field, the left middle field and the left lower field. She has wheezes.           Decreased BS BL bases, mild expiratory wheeze   Abdominal: Soft. Bowel sounds are  normal. She exhibits no distension.   Musculoskeletal: Normal range of motion. She exhibits no edema.   Neurological: She is alert and oriented to person, place, and time. No cranial nerve deficit.   Psychiatric: She has a normal mood and affect. Her behavior is normal. Thought content normal.   Nursing note and vitals reviewed.      Significant Labs:   BMP:   Recent Labs  Lab 01/24/18  1805         K 4.1      CO2 27   BUN 11   CREATININE 0.7   CALCIUM 9.2     CBC:   Recent Labs  Lab 01/24/18  1805 01/25/18  0633   WBC 6.76 9.97   HGB 13.8 11.9*   HCT 41.3 34.5*   * 127*     Troponin:   Recent Labs  Lab 01/24/18  1909 01/24/18  2158 01/25/18  1129   TROPONINI 0.499* 0.980* 0.501*     Urine Studies:   Recent Labs  Lab 01/24/18  1931   COLORU Jennifer   APPEARANCEUA Cloudy*   PHUR 5.0   SPECGRAV 1.020   PROTEINUA 2+*   GLUCUA Negative   KETONESU Negative   BILIRUBINUA Negative   OCCULTUA 2+*   NITRITE Positive*   UROBILINOGEN 2.0   LEUKOCYTESUR 2+*   RBCUA 24*   WBCUA 32*   BACTERIA Moderate*   SQUAMEPITHEL 0   HYALINECASTS 2*       Significant Imaging: I have reviewed all pertinent imaging results/findings within the past 24 hours.

## 2018-01-26 NOTE — ASSESSMENT & PLAN NOTE
- cessation per respiratory, nicotine patch  - long discussion with patient and daughter bedside about need to quit smoking

## 2018-01-26 NOTE — ASSESSMENT & PLAN NOTE
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    - COPD pathway initiated, continue scheduled nebs, prednisone, controllers, PT/OT  - will start on CTX for UTI, consider adding azithromycin for atypical coverage given procal elevation and hypoxia if clinical picture worsens  - on RA at home, wean to 88-92%  - Tamiflu 75mg BID for 5 days, given fevers and rigors will treat

## 2018-01-26 NOTE — ASSESSMENT & PLAN NOTE
- sepsis is known cause of increased procal, d-dimer, and troponin, however, elevations are rather impressive given UTI is only source of infection, patient possibly bacteremic given history and lab results  - for now continue CTX, follow UCx and BCx  - 1/4 SIRS for HR >90  - urine cultures showing 100k > ecoli, susceptibilities  - will discharge tomorrow with oral antibiotics

## 2018-01-26 NOTE — PROGRESS NOTES
"[facility] Hospital Medicine  Progress Note    Patient Name: Cassia Kelly  MRN: [id number]  Patient Class: OP- Observation   Admission Date: 2018  Length of Stay: 0 days  Attending Physician: Wandy Quan MD  Primary Care Provider: Marco A Rondon MD    [facility] Medicine Team: [facility] MED E Yaritza Cooley PA-C    Subjective:     Principal Problem:COPD exacerbation    HPI:  Cassia Kelly is a 58F with COPD, tobacco use who presents for evaluation of fever and rigors. The patient reports a fever of 101.2F at home 1 day PTA, she woke up feeling better although a little weaker than usual and with increased cough. She went to work as a  and when she came up she had an acute episode of rigors that was uncontrollable and associated with SOB and "gasping" for air. She additionally reports increased SOB from her baseline and has NOT been on her COPD controller medications for the past month because they "didn't do anything". She continues to smoke. She is not prescribed home O2, but will occassionally use her 's at night. She endorses R upper back pain, which she attributes to continued coughing. This is a chronic problem for her and has improved s/p pain medications. This pain is reproducible with palpation. She denies any CP currently, she denies CP during exertion. She does report RATLIFF, but this is chronic and unchanged. Her brother  at age 52 from an MI, her father has a pacemarker. She denies history of HLD, CAD, HTN, DM. She denies any urinary complaints, flank pain, abdominal pain, hot joints, LE claudication or swelling.    Hospital Course:  Patient admitted to observation for evaluation of fever. Intake labs remarkable for troponin 0.980->0.501, procalcitonin 22, UA with UTI. Started on Ceftriaxone. Cardiology consulted, not concerned for ACS given no CP or SOB, likely due to infection. Given extensive pulmonary disease, will treat patient " with tamiflu as well (fevers, myalgias). Urine cultures growing Ecoli >100k, susceptibilities pending. Will discharge tomorrow with oral antibiotics.    Interval History: No events overnight. Patient feeling better today. Will discharge tomorrow with oral antibiotics.    Review of Systems   Constitutional: Positive for chills, fatigue and fever.   HENT: Negative for rhinorrhea and sore throat.    Respiratory: Positive for cough, shortness of breath and wheezing. Negative for chest tightness.    Cardiovascular: Negative for chest pain, palpitations and leg swelling.   Gastrointestinal: Negative for abdominal pain, blood in stool, diarrhea, nausea and vomiting.   Genitourinary: Negative for difficulty urinating, dysuria, flank pain and urgency.   Musculoskeletal: Positive for back pain (upper, right). Negative for arthralgias and myalgias.   Skin: Negative for rash and wound.   Neurological: Positive for weakness. Negative for dizziness, syncope, light-headedness and numbness.   Psychiatric/Behavioral: Negative for agitation and confusion.     Objective:     Vital Signs (Most Recent):  Temp: 97.7 °F (36.5 °C) (01/26/18 1137)  Pulse: 87 (01/26/18 1622)  Resp: 18 (01/26/18 1622)  BP: (!) 121/58 (01/26/18 1137)  SpO2: (!) 94 % (01/26/18 1622) Vital Signs (24h Range):  Temp:  [97.3 °F (36.3 °C)-98.2 °F (36.8 °C)] 97.7 °F (36.5 °C)  Pulse:  [59-90] 87  Resp:  [16-20] 18  SpO2:  [92 %-99 %] 94 %  BP: ()/(55-61) 121/58     Weight: 75.5 kg (166 lb 7.2 oz)  Body mass index is 29.48 kg/m².  No intake or output data in the 24 hours ending 01/26/18 1642   Physical Exam   Constitutional: She is oriented to person, place, and time. She appears well-developed and well-nourished. No distress.   Cardiovascular: Normal rate and regular rhythm.    No murmur heard.  Pulmonary/Chest: No accessory muscle usage. No tachypnea. She is in respiratory distress. She has decreased breath sounds in the right middle field, the right lower  field, the left middle field and the left lower field. She has wheezes.           Decreased BS BL bases, mild expiratory wheeze   Abdominal: Soft. Bowel sounds are normal. She exhibits no distension.   Musculoskeletal: Normal range of motion. She exhibits no edema.   Neurological: She is alert and oriented to person, place, and time. No cranial nerve deficit.   Psychiatric: She has a normal mood and affect. Her behavior is normal. Thought content normal.   Nursing note and vitals reviewed.      Significant Labs:   BMP:   Recent Labs  Lab 01/26/18  0515         K 3.3*      CO2 26   BUN 17   CREATININE 0.6   CALCIUM 8.9     CBC:   Recent Labs  Lab 01/24/18  1805 01/25/18  0633 01/26/18  0515   WBC 6.76 9.97 10.33   HGB 13.8 11.9* 11.2*   HCT 41.3 34.5* 33.7*   * 127* 152     Urine Culture:   Recent Labs  Lab 01/24/18 1931   LABURIN PRESUMPTIVE E COLI>100,000 cfu/mlIdentification and susceptibility pending     Urine Studies:   Recent Labs  Lab 01/24/18 1931   COLORU Jennifer   APPEARANCEUA Cloudy*   PHUR 5.0   SPECGRAV 1.020   PROTEINUA 2+*   GLUCUA Negative   KETONESU Negative   BILIRUBINUA Negative   OCCULTUA 2+*   NITRITE Positive*   UROBILINOGEN 2.0   LEUKOCYTESUR 2+*   RBCUA 24*   WBCUA 32*   BACTERIA Moderate*   SQUAMEPITHEL 0   HYALINECASTS 2*       Significant Imaging: I have reviewed all pertinent imaging results/findings within the past 24 hours.    Assessment/Plan:      * COPD exacerbation    58F with COPD, tobacco use no ton controllers x 1 month who presents with increased SOB, wheeze, fever, rigors.    - COPD pathway initiated, continue scheduled nebs, prednisone, controllers, PT/OT  - will start on CTX for UTI, consider adding azithromycin for atypical coverage given procal elevation and hypoxia if clinical picture worsens  - on RA at home, wean to 88-92%  - Tamiflu 75mg BID for 5 days, given fevers and rigors will treat        Acute cystitis    - sepsis is known cause of  increased procal, d-dimer, and troponin, however, elevations are rather impressive given UTI is only source of infection, patient possibly bacteremic given history and lab results  - for now continue CTX, follow UCx and BCx  - 1/4 SIRS for HR >90  - urine cultures showing 100k > ecoli, susceptibilities  - will discharge tomorrow with oral antibiotics        Elevated d-dimer    - unclear significance or etiology  - CTA negative for PE        Acute right-sided thoracic back pain    - associated with increased muscle tone, pain reproducible with palpation or mm  - do not suspect back pain is ACS        Elevated troponin    - likely elevated in setting of infection, 0.499>  - trend, treat UTI  - no CP, no EKG changes suspicious for ischemia  - cardiology consulted, say this is not ACS  -  2D echo for eval of WMA negative        Smoker    - cessation per respiratory, nicotine patch  - long discussion with patient and daughter bedside about need to quit smoking          VTE Risk Mitigation         Ordered     enoxaparin injection 40 mg  Daily     Route:  Subcutaneous        01/25/18 0008     Medium Risk of VTE  Once      01/24/18 2128              Yaritza Cooley PA-C  Department of Hospital Medicine   [facility]

## 2018-01-26 NOTE — ASSESSMENT & PLAN NOTE
- likely elevated in setting of infection, 0.499>  - trend, treat UTI  - no CP, no EKG changes suspicious for ischemia  - cardiology consulted, say this is not ACS  -  2D echo for eval of WMA negative

## 2018-01-26 NOTE — PROGRESS NOTES
"[facility] Hospital Medicine  Progress Note    Patient Name: Cassia Kelly  MRN: [id number]  Patient Class: OP- Observation   Admission Date: 2018  Length of Stay: 0 days  Attending Physician: Wandy Quan MD  Primary Care Provider: Marco A Rondon MD    [facility] Medicine Team: [facility] MED E Yaritza Cooley PA-C    Subjective:     Principal Problem:COPD exacerbation    HPI:  Cassia Kelly is a 58F with COPD, tobacco use who presents for evaluation of fever and rigors. The patient reports a fever of 101.2F at home 1 day PTA, she woke up feeling better although a little weaker than usual and with increased cough. She went to work as a  and when she came up she had an acute episode of rigors that was uncontrollable and associated with SOB and "gasping" for air. She additionally reports increased SOB from her baseline and has NOT been on her COPD controller medications for the past month because they "didn't do anything". She continues to smoke. She is not prescribed home O2, but will occassionally use her 's at night. She endorses R upper back pain, which she attributes to continued coughing. This is a chronic problem for her and has improved s/p pain medications. This pain is reproducible with palpation. She denies any CP currently, she denies CP during exertion. She does report RATLIFF, but this is chronic and unchanged. Her brother  at age 52 from an MI, her father has a pacemarker. She denies history of HLD, CAD, HTN, DM. She denies any urinary complaints, flank pain, abdominal pain, hot joints, LE claudication or swelling.    Hospital Course:  Patient admitted to observation for evaluation of fever. Intake labs remarkable for troponin 0.980->0.501, procalcitonin 22, UA with UTI. Started on Ceftriaxone. Cardiology consulted, not concerned for ACS given no CP or SOB, likely due to infection. Given extensive pulmonary disease, will treat patient " with famiflu as well (fevers, myalgias).    Interval History: No events overnight. Patient seen by Cardiology and will continue treatment for UTI/COPD.    Review of Systems   Constitutional: Positive for chills, fatigue and fever.   HENT: Negative for rhinorrhea and sore throat.    Respiratory: Positive for cough, shortness of breath and wheezing. Negative for chest tightness.    Cardiovascular: Negative for chest pain, palpitations and leg swelling.   Gastrointestinal: Negative for abdominal pain, blood in stool, diarrhea, nausea and vomiting.   Genitourinary: Negative for difficulty urinating, dysuria, flank pain and urgency.   Musculoskeletal: Positive for back pain (upper, right). Negative for arthralgias and myalgias.   Skin: Negative for rash and wound.   Neurological: Positive for weakness. Negative for dizziness, syncope, light-headedness and numbness.   Psychiatric/Behavioral: Negative for agitation and confusion.     Objective:     Vital Signs (Most Recent):  Temp: 97.3 °F (36.3 °C) (01/25/18 1939)  Pulse: 79 (01/25/18 1939)  Resp: 18 (01/25/18 1939)  BP: (!) 115/59 (01/25/18 1939)  SpO2: 99 % (01/25/18 1939) Vital Signs (24h Range):  Temp:  [97.3 °F (36.3 °C)-98.3 °F (36.8 °C)] 97.3 °F (36.3 °C)  Pulse:  [] 79  Resp:  [16-20] 18  SpO2:  [89 %-99 %] 99 %  BP: (106-123)/(59-84) 115/59     Weight: 68.8 kg (151 lb 10.8 oz)  Body mass index is 26.87 kg/m².  No intake or output data in the 24 hours ending 01/25/18 2028   Physical Exam   Constitutional: She is oriented to person, place, and time. She appears well-developed and well-nourished. No distress.   Cardiovascular: Normal rate and regular rhythm.    No murmur heard.  Pulmonary/Chest: No accessory muscle usage. No tachypnea. She is in respiratory distress. She has decreased breath sounds in the right middle field, the right lower field, the left middle field and the left lower field. She has wheezes.           Decreased BS BL bases, mild expiratory  wheeze   Abdominal: Soft. Bowel sounds are normal. She exhibits no distension.   Musculoskeletal: Normal range of motion. She exhibits no edema.   Neurological: She is alert and oriented to person, place, and time. No cranial nerve deficit.   Psychiatric: She has a normal mood and affect. Her behavior is normal. Thought content normal.   Nursing note and vitals reviewed.      Significant Labs:   BMP:   Recent Labs  Lab 01/24/18  1805         K 4.1      CO2 27   BUN 11   CREATININE 0.7   CALCIUM 9.2     CBC:   Recent Labs  Lab 01/24/18  1805 01/25/18  0633   WBC 6.76 9.97   HGB 13.8 11.9*   HCT 41.3 34.5*   * 127*     Troponin:   Recent Labs  Lab 01/24/18  1909 01/24/18  2158 01/25/18  1129   TROPONINI 0.499* 0.980* 0.501*     Urine Studies:   Recent Labs  Lab 01/24/18  1931   COLORU Jennifer   APPEARANCEUA Cloudy*   PHUR 5.0   SPECGRAV 1.020   PROTEINUA 2+*   GLUCUA Negative   KETONESU Negative   BILIRUBINUA Negative   OCCULTUA 2+*   NITRITE Positive*   UROBILINOGEN 2.0   LEUKOCYTESUR 2+*   RBCUA 24*   WBCUA 32*   BACTERIA Moderate*   SQUAMEPITHEL 0   HYALINECASTS 2*       Significant Imaging: I have reviewed all pertinent imaging results/findings within the past 24 hours.    Assessment/Plan:      * COPD exacerbation    58F with COPD, tobacco use no ton controllers x 1 month who presents with increased SOB, wheeze, fever, rigors.    - COPD pathway initiated, continue scheduled nebs, prednisone, controllers, PT/OT  - will start on CTX for UTI, consider adding azithromycin for atypical coverage given procal elevation and hypoxia if clinical picture worsens  - on RA at home, wean to 88-92%  - Tamiflu 75mg BID for 5 days, given fevers and rigors will treat        Acute cystitis    - sepsis is known cause of increased procal, d-dimer, and troponin, however, elevations are rather impressive given UTI is only source of infection, patient possibly bacteremic given history and lab results  - for now  continue CTX, follow UCx and BCx  - 1/4 SIRS for HR >90        Elevated d-dimer    - unclear significance or etiology  - CTA negative for PE        Acute right-sided thoracic back pain    - associated with increased muscle tone, pain reproducible with palpation or mm  - do not suspect back pain is ACS        Elevated troponin    - likely elevated in setting of infection, 0.499>  - trend, treat UTI  - no CP, no EKG changes suspicious for ischemia  - cardiology consulted, say this is not ACS  -  2D echo for eval of WMA negative        Smoker    - cessation per respiratory, nicotine patch  - long discussion with patient and daughter bedside about need to quit smoking          VTE Risk Mitigation         Ordered     enoxaparin injection 40 mg  Daily     Route:  Subcutaneous        01/25/18 0008     Medium Risk of VTE  Once      01/24/18 2128              Yaritza Cooley PA-C  Department of Hospital Medicine   [facility]

## 2018-01-27 VITALS
HEIGHT: 63 IN | WEIGHT: 166.44 LBS | RESPIRATION RATE: 14 BRPM | OXYGEN SATURATION: 92 % | SYSTOLIC BLOOD PRESSURE: 114 MMHG | HEART RATE: 71 BPM | BODY MASS INDEX: 29.49 KG/M2 | DIASTOLIC BLOOD PRESSURE: 66 MMHG | TEMPERATURE: 98 F

## 2018-01-27 LAB
ALBUMIN SERPL BCP-MCNC: 3.2 G/DL
ALP SERPL-CCNC: 112 U/L
ALT SERPL W/O P-5'-P-CCNC: 37 U/L
ANION GAP SERPL CALC-SCNC: 9 MMOL/L
AST SERPL-CCNC: 20 U/L
BACTERIA UR CULT: NORMAL
BASOPHILS # BLD AUTO: 0.02 K/UL
BASOPHILS NFR BLD: 0.2 %
BILIRUB SERPL-MCNC: 0.2 MG/DL
BUN SERPL-MCNC: 14 MG/DL
CALCIUM SERPL-MCNC: 9 MG/DL
CHLORIDE SERPL-SCNC: 105 MMOL/L
CO2 SERPL-SCNC: 27 MMOL/L
CREAT SERPL-MCNC: 0.6 MG/DL
DIFFERENTIAL METHOD: ABNORMAL
EOSINOPHIL # BLD AUTO: 0 K/UL
EOSINOPHIL NFR BLD: 0.3 %
ERYTHROCYTE [DISTWIDTH] IN BLOOD BY AUTOMATED COUNT: 12.9 %
EST. GFR  (AFRICAN AMERICAN): >60 ML/MIN/1.73 M^2
EST. GFR  (NON AFRICAN AMERICAN): >60 ML/MIN/1.73 M^2
GLUCOSE SERPL-MCNC: 78 MG/DL
HCT VFR BLD AUTO: 33.5 %
HGB BLD-MCNC: 11.2 G/DL
IMM GRANULOCYTES # BLD AUTO: 0.05 K/UL
IMM GRANULOCYTES NFR BLD AUTO: 0.6 %
LYMPHOCYTES # BLD AUTO: 2.4 K/UL
LYMPHOCYTES NFR BLD: 27.4 %
MCH RBC QN AUTO: 31.4 PG
MCHC RBC AUTO-ENTMCNC: 33.4 G/DL
MCV RBC AUTO: 94 FL
MONOCYTES # BLD AUTO: 0.6 K/UL
MONOCYTES NFR BLD: 6.6 %
NEUTROPHILS # BLD AUTO: 5.8 K/UL
NEUTROPHILS NFR BLD: 64.9 %
NRBC BLD-RTO: 0 /100 WBC
PLATELET # BLD AUTO: 168 K/UL
PMV BLD AUTO: 11.5 FL
POCT GLUCOSE: 238 MG/DL (ref 70–110)
POCT GLUCOSE: 89 MG/DL (ref 70–110)
POTASSIUM SERPL-SCNC: 4.1 MMOL/L
PROT SERPL-MCNC: 6.3 G/DL
RBC # BLD AUTO: 3.57 M/UL
SODIUM SERPL-SCNC: 141 MMOL/L
WBC # BLD AUTO: 8.92 K/UL

## 2018-01-27 PROCEDURE — 36415 COLL VENOUS BLD VENIPUNCTURE: CPT

## 2018-01-27 PROCEDURE — 25000003 PHARM REV CODE 250: Performed by: PHYSICIAN ASSISTANT

## 2018-01-27 PROCEDURE — 80053 COMPREHEN METABOLIC PANEL: CPT

## 2018-01-27 PROCEDURE — 25000242 PHARM REV CODE 250 ALT 637 W/ HCPCS: Performed by: PHYSICIAN ASSISTANT

## 2018-01-27 PROCEDURE — 63600175 PHARM REV CODE 636 W HCPCS: Performed by: PHYSICIAN ASSISTANT

## 2018-01-27 PROCEDURE — 94761 N-INVAS EAR/PLS OXIMETRY MLT: CPT

## 2018-01-27 PROCEDURE — 94640 AIRWAY INHALATION TREATMENT: CPT

## 2018-01-27 PROCEDURE — S4991 NICOTINE PATCH NONLEGEND: HCPCS | Performed by: PHYSICIAN ASSISTANT

## 2018-01-27 PROCEDURE — 85025 COMPLETE CBC W/AUTO DIFF WBC: CPT

## 2018-01-27 PROCEDURE — G0378 HOSPITAL OBSERVATION PER HR: HCPCS

## 2018-01-27 PROCEDURE — 99900035 HC TECH TIME PER 15 MIN (STAT)

## 2018-01-27 PROCEDURE — 99217 PR OBSERVATION CARE DISCHARGE: CPT | Mod: ,,, | Performed by: PHYSICIAN ASSISTANT

## 2018-01-27 RX ORDER — OSELTAMIVIR PHOSPHATE 75 MG/1
75 CAPSULE ORAL 2 TIMES DAILY
Qty: 5 CAPSULE | Refills: 0 | Status: SHIPPED | OUTPATIENT
Start: 2018-01-27 | End: 2018-01-30

## 2018-01-27 RX ORDER — FLUTICASONE PROPIONATE AND SALMETEROL 100; 50 UG/1; UG/1
POWDER RESPIRATORY (INHALATION)
Qty: 180 EACH | Refills: 3 | Status: SHIPPED | OUTPATIENT
Start: 2018-01-27 | End: 2018-12-04 | Stop reason: ALTCHOICE

## 2018-01-27 RX ORDER — DOXYCYCLINE HYCLATE 100 MG
100 TABLET ORAL EVERY 12 HOURS
Status: DISCONTINUED | OUTPATIENT
Start: 2018-01-28 | End: 2018-01-27 | Stop reason: HOSPADM

## 2018-01-27 RX ORDER — AZITHROMYCIN 250 MG/1
250 TABLET, FILM COATED ORAL DAILY
Qty: 2 TABLET | Refills: 0 | Status: SHIPPED | OUTPATIENT
Start: 2018-01-28 | End: 2018-06-22

## 2018-01-27 RX ADMIN — OSELTAMIVIR PHOSPHATE 75 MG: 75 CAPSULE ORAL at 09:01

## 2018-01-27 RX ADMIN — IPRATROPIUM BROMIDE AND ALBUTEROL SULFATE 3 ML: .5; 3 SOLUTION RESPIRATORY (INHALATION) at 04:01

## 2018-01-27 RX ADMIN — IPRATROPIUM BROMIDE AND ALBUTEROL SULFATE 3 ML: .5; 3 SOLUTION RESPIRATORY (INHALATION) at 12:01

## 2018-01-27 RX ADMIN — TIOTROPIUM BROMIDE 18 MCG: 18 CAPSULE ORAL; RESPIRATORY (INHALATION) at 09:01

## 2018-01-27 RX ADMIN — ASPIRIN 81 MG: 81 TABLET, COATED ORAL at 09:01

## 2018-01-27 RX ADMIN — AZITHROMYCIN 250 MG: 250 TABLET, FILM COATED ORAL at 09:01

## 2018-01-27 RX ADMIN — IPRATROPIUM BROMIDE AND ALBUTEROL SULFATE 3 ML: .5; 3 SOLUTION RESPIRATORY (INHALATION) at 09:01

## 2018-01-27 RX ADMIN — PREDNISONE 40 MG: 20 TABLET ORAL at 09:01

## 2018-01-27 RX ADMIN — POLYETHYLENE GLYCOL 3350 17 G: 17 POWDER, FOR SOLUTION ORAL at 09:01

## 2018-01-27 RX ADMIN — FLUTICASONE FUROATE AND VILANTEROL TRIFENATATE 1 PUFF: 100; 25 POWDER RESPIRATORY (INHALATION) at 09:01

## 2018-01-27 RX ADMIN — NICOTINE 1 PATCH: 14 PATCH, EXTENDED RELEASE TRANSDERMAL at 09:01

## 2018-01-27 NOTE — PLAN OF CARE
Problem: Patient Care Overview  Goal: Plan of Care Review  Outcome: Ongoing (interventions implemented as appropriate)  VS stable, (see assessment for full review), pulse ox 94-96% on room air, no c/o chest pain or SOB-mild dyspnea on exertion noted. CBG WDL this morning. Questions answered, concerns addressed. For discharge, AVS reviewed and pt.verbalized understanding.

## 2018-01-27 NOTE — NURSING
Contacted IM-E.  Evening .  No PRN insulin orders in chart.  Per provider, continue to monitor pt, taking BG between 0400 and 0600.  If continues to be high will contact IM E.

## 2018-01-27 NOTE — PLAN OF CARE
Problem: Chronic Obstructive Pulmonary Disease (Adult)  Goal: Signs and Symptoms of Listed Potential Problems Will be Absent, Minimized or Managed (Chronic Obstructive Pulmonary Disease)  Signs and symptoms of listed potential problems will be absent, minimized or managed by discharge/transition of care (reference Chronic Obstructive Pulmonary Disease (Adult) CPG).   Outcome: Ongoing (interventions implemented as appropriate)  Reviewed s/s or resp. Distress/decompromise to report to medical team, use of inhalers properly for prevention of excerbation, voices understanding

## 2018-01-27 NOTE — PLAN OF CARE
Problem: Fall Risk (Adult)  Goal: Absence of Falls  Patient will demonstrate the desired outcomes by discharge/transition of care.   Outcome: Ongoing (interventions implemented as appropriate)  Remain fall free, gait stable and independent.

## 2018-01-29 LAB — BACTERIA BLD CULT: NORMAL

## 2018-01-29 NOTE — ASSESSMENT & PLAN NOTE
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    - COPD pathway initiated, continue scheduled nebs, prednisone, controllers, PT/OT  - will start on CTX for UTI, consider adding azithromycin for atypical coverage given procal elevation and hypoxia if clinical picture worsens  - on RA at home, wean to 88-92%  - Tamiflu 75mg BID for 5 days, given fevers and rigors will treat  - Discharged with Tamiflu

## 2018-01-29 NOTE — ASSESSMENT & PLAN NOTE
- sepsis is known cause of increased procal, d-dimer, and troponin, however, elevations are rather impressive given UTI is only source of infection, patient possibly bacteremic given history and lab results  - for now continue CTX, follow UCx and BCx  - 1/4 SIRS for HR >90  - urine cultures showing 100k > ecoli susceptible to Doxycycline  - stable for discharge with home antibiotics

## 2018-01-29 NOTE — ASSESSMENT & PLAN NOTE
- likely elevated in setting of infection, 0.499>0.980>0.501  - trend, treat UTI  - no CP, no EKG changes suspicious for ischemia  - cardiology consulted, say this is not ACS  -  2D echo for eval of WMA negative

## 2018-01-29 NOTE — DISCHARGE SUMMARY
"[facility] Hospital Medicine  Discharge Summary      Patient Name: Cassia Kelly  MRN: [id number]  Admission Date: 2018  Hospital Length of Stay: 0 days  Discharge Date and Time: 2018  1:29 PM  Attending Physician: No att. providers found   Discharging Provider: Yaritza Cooley PA-C  Primary Care Provider: Marco A Rondon MD  [facility] Medicine Team: [facility] Yaritza Cooley PA-C    HPI:   Cassia Kelly is a 58F with COPD, tobacco use who presents for evaluation of fever and rigors. The patient reports a fever of 101.2F at home 1 day PTA, she woke up feeling better although a little weaker than usual and with increased cough. She went to work as a  and when she came up she had an acute episode of rigors that was uncontrollable and associated with SOB and "gasping" for air. She additionally reports increased SOB from her baseline and has NOT been on her COPD controller medications for the past month because they "didn't do anything". She continues to smoke. She is not prescribed home O2, but will occassionally use her 's at night. She endorses R upper back pain, which she attributes to continued coughing. This is a chronic problem for her and has improved s/p pain medications. This pain is reproducible with palpation. She denies any CP currently, she denies CP during exertion. She does report RATLIFF, but this is chronic and unchanged. Her brother  at age 52 from an MI, her father has a pacemarker. She denies history of HLD, CAD, HTN, DM. She denies any urinary complaints, flank pain, abdominal pain, hot joints, LE claudication or swelling.    * No surgery found *      Hospital Course:   Patient admitted to observation for evaluation of fever. Intake labs remarkable for troponin 0.980->0.501, procalcitonin 22, UA with UTI. Started on Ceftriaxone. Cardiology consulted, not concerned for ACS given no CP or SOB, likely due to infection. Given " extensive pulmonary disease, will treat patient with tamiflu as well (fevers, myalgias). Urine cultures growing Ecoli >100k, susceptibilities to Doxycycline. Patient discharged with home antibiotics and tamiflu.      Consults:   Consults         Status Ordering Provider     Inpatient consult to Cardiology  Once     Provider:  (Not yet assigned)    Completed LUCIANO KNUTSON     Inpatient consult to Social Work/Case Management  Once     Provider:  (Not yet assigned)    Completed LUCIANO KNUTSON          * COPD exacerbation    58F with COPD, tobacco use no ton controllers x 1 month who presents with increased SOB, wheeze, fever, rigors.    - COPD pathway initiated, continue scheduled nebs, prednisone, controllers, PT/OT  - will start on CTX for UTI, consider adding azithromycin for atypical coverage given procal elevation and hypoxia if clinical picture worsens  - on RA at home, wean to 88-92%  - Tamiflu 75mg BID for 5 days, given fevers and rigors will treat  - Discharged with Tamiflu        Acute cystitis    - sepsis is known cause of increased procal, d-dimer, and troponin, however, elevations are rather impressive given UTI is only source of infection, patient possibly bacteremic given history and lab results  - for now continue CTX, follow UCx and BCx  - 1/4 SIRS for HR >90  - urine cultures showing 100k > ecoli susceptible to Doxycycline  - stable for discharge with home antibiotics        Elevated d-dimer    - unclear significance or etiology  - CTA negative for PE        Acute right-sided thoracic back pain    - associated with increased muscle tone, pain reproducible with palpation or mm  - do not suspect back pain is ACS  - related to kidney infection        Elevated troponin    - likely elevated in setting of infection, 0.499>0.980>0.501  - trend, treat UTI  - no CP, no EKG changes suspicious for ischemia  - cardiology consulted, say this is not ACS  -  2D echo for eval of WMA negative        Smoker    -  cessation per respiratory, nicotine patch  - long discussion with patient and daughter bedside about need to quit smoking          Final Active Diagnoses:    Diagnosis Date Noted POA    PRINCIPAL PROBLEM:  COPD exacerbation [J44.1] 01/24/2018 Yes    Acute cystitis [N30.00] 01/24/2018 Yes    Elevated d-dimer [R79.89] 01/25/2018 Yes    Elevated procalcitonin [R79.89] 01/25/2018 Yes    Elevated troponin [R74.8] 01/24/2018 Yes    Acute right-sided thoracic back pain [M54.6] 01/24/2018 Yes    Smoker [F17.200] 11/23/2015 Yes      Problems Resolved During this Admission:    Diagnosis Date Noted Date Resolved POA       Discharged Condition: good    Disposition: Home or Self Care    Follow Up:  Follow-up Information     Marco A Rondon MD On 1/31/2018.    Specialty:  Internal Medicine  Why:  at 1:00pm  Contact information:  [address]  [place] [address]  [phone]             [facility].    Specialty:  Emergency Medicine  Why:  If symptoms worsen, As needed  Contact information:  [address] [facility] [id number] [phone]               Patient Instructions:     Diet Cardiac     Activity as tolerated     Notify your health care provider if you experience any of the following:  temperature >100.4     Notify your health care provider if you experience any of the following:  severe uncontrolled pain     Notify your health care provider if you experience any of the following:  difficulty breathing or increased cough     Notify your health care provider if you experience any of the following:  severe persistent headache     Notify your health care provider if you experience any of the following:  increased confusion or weakness         Significant Diagnostic Studies: Microbiology:   Urine Culture    Lab Results   Component Value Date    LABURIN ESCHERICHIA COLI  >100,000 cfu/ml   01/24/2018       Pending Diagnostic Studies:     None         Medications:  Reconciled  Home Medications:   Discharge Medication List as of 1/27/2018 11:58 AM      START taking these medications    Details   azithromycin (Z-PALMA) 250 MG tablet Take 1 tablet (250 mg total) by mouth once daily., Starting Sun 1/28/2018, Normal      doxycycline (VIBRAMYCIN) 100 MG Cap Take 1 capsule (100 mg total) by mouth 2 (two) times daily., Starting Wed 1/24/2018, Until Sat 2/3/2018, Normal      hydrocodone-acetaminophen 5-325mg (NORCO) 5-325 mg per tablet Take 1 tablet by mouth every 4 (four) hours as needed for Pain., Starting Wed 1/24/2018, Print      levoFLOXacin (LEVAQUIN) 500 MG tablet Take 1.5 tablets (750 mg total) by mouth once daily., Starting Wed 1/24/2018, Until Mon 1/29/2018, Normal      oseltamivir (TAMIFLU) 75 MG capsule Take 1 capsule (75 mg total) by mouth 2 (two) times daily., Starting Sat 1/27/2018, Until Tue 1/30/2018, Normal      predniSONE (DELTASONE) 20 MG tablet Take 2 tablets (40 mg total) by mouth once daily., Starting Wed 1/24/2018, Until Mon 1/29/2018, Normal         CONTINUE these medications which have NOT CHANGED    Details   ipratropium-albuterol (COMBIVENT RESPIMAT)  mcg/actuation inhaler INHALE 1 PUFF INTO THE LUNGS  4 (FOUR) TIMES DAILY., Normal      fluticasone-salmeterol 100-50 mcg/dose (ADVAIR DISKUS) 100-50 mcg/dose diskus inhaler INHALE 1 PUFF INTO THE LUNGS   2 (TWO) TIMES DAILY., Normal             Indwelling Lines/Drains at time of discharge:   Lines/Drains/Airways          No matching active lines, drains, or airways          Time spent on the discharge of patient: 36 minutes  Patient was seen and examined on the date of discharge and determined to be suitable for discharge.         Yaritza Cooley PA-C  Department of Hospital Medicine  [facility]

## 2018-01-29 NOTE — ASSESSMENT & PLAN NOTE
- associated with increased muscle tone, pain reproducible with palpation or mm  - do not suspect back pain is ACS  - related to kidney infection

## 2018-01-30 ENCOUNTER — CLINICAL SUPPORT (OUTPATIENT)
Dept: SMOKING CESSATION | Facility: CLINIC | Age: 58
End: 2018-01-30
Payer: COMMERCIAL

## 2018-01-30 DIAGNOSIS — F17.200 NICOTINE DEPENDENCE: Primary | ICD-10-CM

## 2018-01-30 LAB — BACTERIA BLD CULT: NORMAL

## 2018-01-30 PROCEDURE — 99406 BEHAV CHNG SMOKING 3-10 MIN: CPT | Mod: S$GLB,,,

## 2018-01-31 ENCOUNTER — OFFICE VISIT (OUTPATIENT)
Dept: INTERNAL MEDICINE | Facility: CLINIC | Age: 58
End: 2018-01-31
Payer: COMMERCIAL

## 2018-01-31 VITALS
HEART RATE: 64 BPM | WEIGHT: 159.63 LBS | DIASTOLIC BLOOD PRESSURE: 70 MMHG | BODY MASS INDEX: 28.29 KG/M2 | SYSTOLIC BLOOD PRESSURE: 110 MMHG | HEIGHT: 63 IN

## 2018-01-31 DIAGNOSIS — J44.1 COPD EXACERBATION: ICD-10-CM

## 2018-01-31 DIAGNOSIS — N30.00 ACUTE CYSTITIS WITHOUT HEMATURIA: Primary | ICD-10-CM

## 2018-01-31 PROCEDURE — 99999 PR PBB SHADOW E&M-EST. PATIENT-LVL III: CPT | Mod: PBBFAC,,, | Performed by: INTERNAL MEDICINE

## 2018-01-31 PROCEDURE — 3008F BODY MASS INDEX DOCD: CPT | Mod: S$GLB,,, | Performed by: INTERNAL MEDICINE

## 2018-01-31 PROCEDURE — 99214 OFFICE O/P EST MOD 30 MIN: CPT | Mod: S$GLB,,, | Performed by: INTERNAL MEDICINE

## 2018-01-31 NOTE — PROGRESS NOTES
Subjective:       Patient ID: Cassia Kelly is a 58 y.o. female.    Chief Complaint: Follow-up    Pt recently in hospital with UTI and COPD exacerbation.  Meds and workup reviewed.  Feeling well.      Review of Systems    Objective:      Physical Exam   Constitutional: She is oriented to person, place, and time. She appears well-developed and well-nourished. No distress.   HENT:   Head: Normocephalic and atraumatic.   Mouth/Throat: Oropharynx is clear and moist.   Eyes: Conjunctivae are normal. Pupils are equal, round, and reactive to light.   Neck: Normal range of motion. Neck supple.   Cardiovascular: Normal rate, regular rhythm and normal heart sounds.    Pulmonary/Chest: Effort normal and breath sounds normal. She has no wheezes.   Abdominal: Soft. Bowel sounds are normal. There is no tenderness.   Musculoskeletal: Normal range of motion. She exhibits no edema or tenderness.   Neurological: She is alert and oriented to person, place, and time. No cranial nerve deficit.   Skin: No erythema.   Psychiatric: She has a normal mood and affect.   Vitals reviewed.      Assessment:       1. Acute cystitis without hematuria    2. COPD exacerbation        Plan:       Cassia Antonio was seen today for follow-up.    Diagnoses and all orders for this visit:    Acute cystitis without hematuria  Comments:  doing well    COPD exacerbation  Comments:  breathing is good    Complete meds-  Call if symptoms recur    Follow-up if symptoms worsen or fail to improve.

## 2018-02-01 NOTE — PLAN OF CARE
02/01/18 0858   Final Note   Assessment Type Final Discharge Note     Patient discharged home with no needs 1/27/18.

## 2018-02-07 ENCOUNTER — TELEPHONE (OUTPATIENT)
Dept: INTERNAL MEDICINE | Facility: CLINIC | Age: 58
End: 2018-02-07

## 2018-02-07 RX ORDER — FLUCONAZOLE 150 MG/1
150 TABLET ORAL DAILY
Qty: 1 TABLET | Refills: 1 | Status: SHIPPED | OUTPATIENT
Start: 2018-02-07 | End: 2018-02-08

## 2018-02-07 NOTE — TELEPHONE ENCOUNTER
----- Message from Johanna Bowens sent at 2/7/2018  3:46 PM CST -----  Contact: Pt [phone]  Pt would like some medication called in for a yeast infection.    [id number] - [place] - [address] [facility] [phone] (Phone)  [phone] (Fax

## 2018-06-22 ENCOUNTER — OFFICE VISIT (OUTPATIENT)
Dept: INTERNAL MEDICINE | Facility: CLINIC | Age: 58
End: 2018-06-22
Payer: COMMERCIAL

## 2018-06-22 ENCOUNTER — LAB VISIT (OUTPATIENT)
Dept: LAB | Facility: HOSPITAL | Age: 58
End: 2018-06-22
Attending: INTERNAL MEDICINE
Payer: COMMERCIAL

## 2018-06-22 VITALS
BODY MASS INDEX: 28.52 KG/M2 | HEART RATE: 76 BPM | DIASTOLIC BLOOD PRESSURE: 70 MMHG | HEIGHT: 63 IN | SYSTOLIC BLOOD PRESSURE: 106 MMHG | OXYGEN SATURATION: 96 % | WEIGHT: 160.94 LBS

## 2018-06-22 DIAGNOSIS — N30.01 ACUTE CYSTITIS WITH HEMATURIA: Primary | ICD-10-CM

## 2018-06-22 DIAGNOSIS — N30.01 ACUTE CYSTITIS WITH HEMATURIA: ICD-10-CM

## 2018-06-22 PROBLEM — M54.6 ACUTE RIGHT-SIDED THORACIC BACK PAIN: Status: RESOLVED | Noted: 2018-01-24 | Resolved: 2018-06-22

## 2018-06-22 PROBLEM — J44.1 COPD EXACERBATION: Status: RESOLVED | Noted: 2018-01-24 | Resolved: 2018-06-22

## 2018-06-22 PROBLEM — R79.89 ELEVATED D-DIMER: Status: RESOLVED | Noted: 2018-01-25 | Resolved: 2018-06-22

## 2018-06-22 PROBLEM — N30.00 ACUTE CYSTITIS: Status: RESOLVED | Noted: 2018-01-24 | Resolved: 2018-06-22

## 2018-06-22 PROBLEM — R79.89 ELEVATED TROPONIN: Status: RESOLVED | Noted: 2018-01-24 | Resolved: 2018-06-22

## 2018-06-22 PROBLEM — R79.89 ELEVATED PROCALCITONIN: Status: RESOLVED | Noted: 2018-01-25 | Resolved: 2018-06-22

## 2018-06-22 LAB
BILIRUB SERPL-MCNC: ABNORMAL MG/DL
BLOOD, POC UA: ABNORMAL
GLUCOSE UR QL STRIP: ABNORMAL
KETONES UR QL STRIP: ABNORMAL
LEUKOCYTE ESTERASE URINE, POC: ABNORMAL
NITRITE, POC UA: ABNORMAL
PH, POC UA: 6
PROTEIN, POC: ABNORMAL
SPECIFIC GRAVITY, POC UA: 1
UROBILINOGEN, POC UA: ABNORMAL

## 2018-06-22 PROCEDURE — 87086 URINE CULTURE/COLONY COUNT: CPT

## 2018-06-22 PROCEDURE — 3008F BODY MASS INDEX DOCD: CPT | Mod: CPTII,S$GLB,, | Performed by: INTERNAL MEDICINE

## 2018-06-22 PROCEDURE — 87077 CULTURE AEROBIC IDENTIFY: CPT

## 2018-06-22 PROCEDURE — 99214 OFFICE O/P EST MOD 30 MIN: CPT | Mod: 25,S$GLB,, | Performed by: INTERNAL MEDICINE

## 2018-06-22 PROCEDURE — 87088 URINE BACTERIA CULTURE: CPT

## 2018-06-22 PROCEDURE — 81000 URINALYSIS NONAUTO W/SCOPE: CPT | Mod: S$GLB,,, | Performed by: INTERNAL MEDICINE

## 2018-06-22 PROCEDURE — 99999 PR PBB SHADOW E&M-EST. PATIENT-LVL III: CPT | Mod: PBBFAC,,, | Performed by: INTERNAL MEDICINE

## 2018-06-22 PROCEDURE — 87186 SC STD MICRODIL/AGAR DIL: CPT

## 2018-06-22 RX ORDER — SULFAMETHOXAZOLE AND TRIMETHOPRIM 800; 160 MG/1; MG/1
1 TABLET ORAL 2 TIMES DAILY
Qty: 20 TABLET | Refills: 0 | Status: SHIPPED | OUTPATIENT
Start: 2018-06-22 | End: 2018-07-02

## 2018-06-22 RX ORDER — FLUCONAZOLE 200 MG/1
200 TABLET ORAL
Qty: 2 TABLET | Refills: 0 | Status: SHIPPED | OUTPATIENT
Start: 2018-06-22 | End: 2018-06-24

## 2018-06-22 NOTE — PATIENT INSTRUCTIONS
Take Bactrim DS 1 tablet twice daily for 10 days  Take Diflucan 200 m tablet on  and .      Urinary Tract Infection Prevention:    Drink plenty of water.  May use cranberry juice (but needs to be sugar free).  Avoid soft drinks.  Take yogurt (Activia, Greek or Guyanese) 1 serving daily.

## 2018-06-22 NOTE — PROGRESS NOTES
INTERNAL MEDICINE CLINIC - SAME DAY APPOINTMENT  Progress Note    PRESENTING HISTORY     PCP: Marco A Rondon MD  Chief Complaint/Reason for Visit:     Chief Complaint   Patient presents with    Urinary Tract Infection     History of Present Illness & ROS : Ms. Cassia Kelly is a 58 y.o. female.      She started having symptoms 10 days ago.  She had yeast infection. She took antibiotic for it.  3 days ago the symptoms recurred with lower abdominal pain.  No fever or chill.  No back pain.    PAST HISTORY:     Past Medical History:   Diagnosis Date    Abnormal Pap smear     Hyst    Acute cystitis 2018    Adenomatous polyp of colon 2015    Repeat c-scope 5 yrs     Cervical cancer     COPD (chronic obstructive pulmonary disease)     Microscopic hematuria 2013    Smoker 2015    STD (sexually transmitted disease)     Ureteral calculus, left 2013       Past Surgical History:   Procedure Laterality Date    APPENDECTOMY       SECTION      x1    HYSTERECTOMY      LAVERN/LSO (Abn Pap)    OOPHORECTOMY      Vocal chord nodules removed         Family History   Problem Relation Age of Onset    Hypertension Father     Hypertension Mother     Heart disease Sister         arrhy    Heart disease Brother         MI    Cancer Paternal Aunt         colon ?    Melanoma Neg Hx     Psoriasis Neg Hx     Lupus Neg Hx        Social History     Social History    Marital status:      Spouse name: N/A    Number of children: N/A    Years of education: N/A     Social History Main Topics    Smoking status: Current Some Day Smoker     Packs/day: 1.00     Types: Cigarettes    Smokeless tobacco: Never Used    Alcohol use No    Drug use: Unknown    Sexual activity: Yes     Partners: Male     Birth control/ protection: Post-menopausal     Other Topics Concern    Are You Pregnant Or Think You May Be? No    Breast-Feeding No     Social History Narrative    No narrative on  file       MEDICATIONS & ALLERGIES:     Current Outpatient Prescriptions on File Prior to Visit   Medication Sig Dispense Refill           fluticasone-salmeterol 100-50 mcg/dose (ADVAIR DISKUS) 100-50 mcg/dose diskus inhaler INHALE 1 PUFF INTO THE LUNGS   2 (TWO) TIMES DAILY. 180 each 3    ipratropium-albuterol (COMBIVENT RESPIMAT)  mcg/actuation inhaler INHALE 1 PUFF INTO THE LUNGS  4 (FOUR) TIMES DAILY. 3 Package 3     No current facility-administered medications on file prior to visit.         Review of patient's allergies indicates:   Allergen Reactions    Penicillins Rash       Medications Reconciliation:   I have reconciled the patient's home medications with the patient/family. I have updated all changes.  Refer to After-Visit Medication List.    OBJECTIVE:     Vital Signs:  Vitals:    06/22/18 1107   BP: 106/70   Pulse: 76     Wt Readings from Last 1 Encounters:   06/22/18 1107 73 kg (160 lb 15 oz)     Body mass index is 28.51 kg/m².     Physical Exam:  General: Well developed, well nourished. No distress.  HEENT: Head is normocephalic, atraumatic   Eyes: Clear conjunctiva.  Neck: Supple, symmetrical neck; trachea midline.  Lungs:  normal respiratory effort.  Cardiovascular: Heart with regular rate and rhythm.    Extremities: No LE edema.    Abdomen: Abdomen is soft, non-distended with normal bowel sounds.  Mild suprapubic fullness. No CVA tenderness.  Skin: Skin color, texture, turgor normal. No rashes.  Musculoskeletal: Normal gait.   Psychiatric: Normal affect. Alert.    Laboratory  Lab Results   Component Value Date    WBC 8.92 01/27/2018    HGB 11.2 (L) 01/27/2018    HCT 33.5 (L) 01/27/2018     01/27/2018    CHOL 167 01/25/2018    TRIG 120 01/25/2018    HDL 36 (L) 01/25/2018    ALT 37 01/27/2018    AST 20 01/27/2018     01/27/2018    K 4.1 01/27/2018     01/27/2018    CREATININE 0.6 01/27/2018    BUN 14 01/27/2018    CO2 27 01/27/2018    TSH 0.695 03/30/2015    HGBA1C 5.2  "2018       Urine Culture    Urine Culture, Routine ESCHERICHIA COLI   >100,000 cfu/ml        Resulting Agency [facility]   Susceptibility      Escherichia coli     CULTURE, URINE     Amox/K Clav'ate <=8/4 "><=8/4  Sensitive     Amp/Sulbactam <=8/4 "><=8/4  Sensitive     Ampicillin >16  Resistant     Cefazolin <=8 "><=8  Sensitive     Cefepime <=8 "><=8  Sensitive     Ceftriaxone <=8 "><=8  Sensitive     Ciprofloxacin >2  Resistant     Ertapenem <=2 "><=2  Sensitive     Gentamicin <=4 "><=4  Sensitive     Meropenem <=4 "><=4  Sensitive     Nitrofurantoin <=32 "><=32  Sensitive     Piperacillin/Tazo <=16 "><=16  Sensitive     Tetracycline <=4 "><=4  Sensitive     Tobramycin <=4 "><=4  Sensitive     Trimeth/Sulfa <=2/38 "><=2/38  Sensitive           Narrative        Diagnostic Results:  POCT Urine: 2+WBC, Nitrite positive, 50 RBX      ASSESSMENT & PLAN:     Acute cystitis with hematuria  -     POCT URINALYSIS  -     Urine culture; Future; Expected date: 2018    Plan: Treat with -  -     sulfamethoxazole-trimethoprim 800-160mg (BACTRIM DS) 800-160 mg Tab; Take 1 tablet by mouth 2 (two) times daily. for 10 days  Dispense: 20 tablet; Refill: 0  -     fluconazole (DIFLUCAN) 200 MG Tab; Take 1 tablet (200 mg total) by mouth Every 5 (five) days. for 2 days  Dispense: 2 tablet; Refill: 0    Instructions for the patient:    Take Bactrim DS 1 tablet twice daily for 10 days  Take Diflucan 200 m tablet on  and .    Urinary Tract Infection Prevention:    Drink plenty of water.  May use cranberry juice (but needs to be sugar free).  Avoid soft drinks.  Take yogurt (Activia, Greek or Indian) 1 serving daily.      After Visit Medication List :     Medication List          Accurate as of 18 11:29 AM. If you have any questions, ask your nurse or doctor.               START taking these medications    fluconazole 200 MG Tab  Commonly known as:  DIFLUCAN  Take 1 tablet (200 mg total) by mouth Every 5 (five) " days. for 2 days  Started by:  Milan Andrade MD     sulfamethoxazole-trimethoprim 800-160mg 800-160 mg Tab  Commonly known as:  BACTRIM DS  Take 1 tablet by mouth 2 (two) times daily. for 10 days  Started by:  Milan Andrade MD        CONTINUE taking these medications    fluticasone-salmeterol 100-50 mcg/dose 100-50 mcg/dose diskus inhaler  Commonly known as:  ADVAIR DISKUS  INHALE 1 PUFF INTO THE LUNGS   2 (TWO) TIMES DAILY.     ipratropium-albuterol  mcg/actuation inhaler  Commonly known as:  COMBIVENT RESPIMAT  INHALE 1 PUFF INTO THE LUNGS  4 (FOUR) TIMES DAILY.        STOP taking these medications    azithromycin 250 MG tablet  Commonly known as:  Z-PALMA  Stopped by:  Milan Andrade MD           Where to Get Your Medications      These medications were sent to [id number] - [place] - [address] [id number] [facility] [address]    Phone:  [phone]   · fluconazole 200 MG Tab  · sulfamethoxazole-trimethoprim 800-160mg 800-160 mg Tab         Signing Physician:  Milan Andrade MD

## 2018-06-25 LAB — BACTERIA UR CULT: NORMAL

## 2018-06-26 ENCOUNTER — TELEPHONE (OUTPATIENT)
Dept: INTERNAL MEDICINE | Facility: CLINIC | Age: 58
End: 2018-06-26

## 2018-06-26 NOTE — TELEPHONE ENCOUNTER
Called multiple times, no answer.  Also released culture report with comment.  Her urine culture was positive for Klebsiella.  Her Bactrim prescription is effective.

## 2018-07-17 DIAGNOSIS — J44.9 CHRONIC OBSTRUCTIVE PULMONARY DISEASE, UNSPECIFIED COPD TYPE: ICD-10-CM

## 2018-07-17 RX ORDER — IPRATROPIUM BROMIDE AND ALBUTEROL 20; 100 UG/1; UG/1
SPRAY, METERED RESPIRATORY (INHALATION)
Qty: 12 G | Refills: 2 | Status: SHIPPED | OUTPATIENT
Start: 2018-07-17 | End: 2018-12-04 | Stop reason: ALTCHOICE

## 2018-10-10 ENCOUNTER — NURSE TRIAGE (OUTPATIENT)
Dept: ADMINISTRATIVE | Facility: CLINIC | Age: 58
End: 2018-10-10

## 2018-10-10 ENCOUNTER — OFFICE VISIT (OUTPATIENT)
Dept: INTERNAL MEDICINE | Facility: CLINIC | Age: 58
End: 2018-10-10
Payer: COMMERCIAL

## 2018-10-10 VITALS
TEMPERATURE: 99 F | HEART RATE: 83 BPM | DIASTOLIC BLOOD PRESSURE: 64 MMHG | WEIGHT: 156.94 LBS | BODY MASS INDEX: 27.81 KG/M2 | OXYGEN SATURATION: 92 % | SYSTOLIC BLOOD PRESSURE: 118 MMHG

## 2018-10-10 DIAGNOSIS — J44.1 ACUTE EXACERBATION OF CHRONIC OBSTRUCTIVE PULMONARY DISEASE (COPD): Primary | ICD-10-CM

## 2018-10-10 PROCEDURE — 99999 PR PBB SHADOW E&M-EST. PATIENT-LVL III: CPT | Mod: PBBFAC,,, | Performed by: INTERNAL MEDICINE

## 2018-10-10 PROCEDURE — 3008F BODY MASS INDEX DOCD: CPT | Mod: CPTII,S$GLB,, | Performed by: INTERNAL MEDICINE

## 2018-10-10 PROCEDURE — 94640 AIRWAY INHALATION TREATMENT: CPT | Mod: 59,S$GLB,, | Performed by: INTERNAL MEDICINE

## 2018-10-10 PROCEDURE — 99214 OFFICE O/P EST MOD 30 MIN: CPT | Mod: 25,S$GLB,, | Performed by: INTERNAL MEDICINE

## 2018-10-10 RX ORDER — ALBUTEROL SULFATE 0.83 MG/ML
2.5 SOLUTION RESPIRATORY (INHALATION)
Status: COMPLETED | OUTPATIENT
Start: 2018-10-10 | End: 2018-10-10

## 2018-10-10 RX ORDER — FLUCONAZOLE 150 MG/1
150 TABLET ORAL DAILY
Qty: 1 TABLET | Refills: 0 | Status: SHIPPED | OUTPATIENT
Start: 2018-10-10 | End: 2018-10-11

## 2018-10-10 RX ORDER — LEVOFLOXACIN 500 MG/1
500 TABLET, FILM COATED ORAL DAILY
Qty: 10 TABLET | Refills: 0 | Status: SHIPPED | OUTPATIENT
Start: 2018-10-10 | End: 2018-10-20

## 2018-10-10 RX ORDER — ALBUTEROL SULFATE 0.83 MG/ML
2.5 SOLUTION RESPIRATORY (INHALATION) EVERY 6 HOURS PRN
Qty: 50 BOX | Refills: 2 | Status: SHIPPED | OUTPATIENT
Start: 2018-10-10 | End: 2019-01-14 | Stop reason: SDUPTHER

## 2018-10-10 RX ORDER — METHYLPREDNISOLONE 4 MG/1
TABLET ORAL
Qty: 1 PACKAGE | Refills: 0 | Status: SHIPPED | OUTPATIENT
Start: 2018-10-10 | End: 2018-10-31

## 2018-10-10 RX ADMIN — ALBUTEROL SULFATE 2.5 MG: 0.83 SOLUTION RESPIRATORY (INHALATION) at 05:10

## 2018-10-10 NOTE — TELEPHONE ENCOUNTER
Reason for Disposition   MILD difficulty breathing (e.g., minimal/no SOB at rest, SOB with walking, pulse < 100) of new onset or worse than normal    Protocols used: ST BREATHING DIFFICULTY-A-OH

## 2018-10-10 NOTE — PROGRESS NOTES
Subjective:       Patient ID: Cassia Kelly is a 58 y.o. female.    Chief Complaint: COPD    Patient with known COPD presents with 2 day history of increased cough and need for inhaled meds 6 times a day at least.  Usual dose is 2-3 times a day No fever, rarely coughs up any mucus      Review of Systems   Constitutional: Negative for activity change, chills, fatigue and fever.   HENT: Negative for congestion, ear pain, nosebleeds, postnasal drip, sinus pressure and sore throat.    Eyes: Negative.  Negative for visual disturbance.   Respiratory: Negative for cough, chest tightness, shortness of breath and wheezing.    Cardiovascular: Negative for chest pain.   Gastrointestinal: Negative for abdominal pain, diarrhea, nausea and vomiting.   Genitourinary: Negative for difficulty urinating, dysuria, frequency and urgency.   Musculoskeletal: Negative for arthralgias and neck stiffness.   Skin: Negative for rash.   Neurological: Negative for dizziness, weakness and headaches.   Psychiatric/Behavioral: Negative for sleep disturbance. The patient is not nervous/anxious.        Objective:      Physical Exam   Constitutional: She is oriented to person, place, and time. She appears well-developed and well-nourished.  Non-toxic appearance. No distress.   HENT:   Head: Normocephalic and atraumatic.   Right Ear: Tympanic membrane, external ear and ear canal normal.   Left Ear: Tympanic membrane, external ear and ear canal normal.   Eyes: EOM are normal. Pupils are equal, round, and reactive to light. No scleral icterus.   Neck: Normal range of motion. Neck supple. No thyromegaly present.   Cardiovascular: Normal rate, regular rhythm and normal heart sounds.   Pulmonary/Chest: Effort normal. She has decreased breath sounds in the right upper field, the right middle field, the right lower field, the left middle field and the left lower field. She has no wheezes. She has no rhonchi. She has no rales.           Abdominal:  Soft. Bowel sounds are normal. She exhibits no mass. There is no tenderness. There is no rebound.   Musculoskeletal: Normal range of motion.   Lymphadenopathy:     She has no cervical adenopathy.   Neurological: She is alert and oriented to person, place, and time. She has normal reflexes. She displays normal reflexes. No cranial nerve deficit. She exhibits normal muscle tone. Coordination normal.   Skin: Skin is warm and dry.   Psychiatric: She has a normal mood and affect. Her behavior is normal.       Assessment:       1. Acute exacerbation of chronic obstructive pulmonary disease (COPD)        Plan:   Cassia Antonio was seen today for copd.    Diagnoses and all orders for this visit:    Acute exacerbation of chronic obstructive pulmonary disease (COPD)    Other orders  -     albuterol nebulizer solution 2.5 mg; Take 3 mLs (2.5 mg total) by nebulization one time.  -     levoFLOXacin (LEVAQUIN) 500 MG tablet; Take 1 tablet (500 mg total) by mouth once daily. for 10 days  -     albuterol (PROVENTIL) 2.5 mg /3 mL (0.083 %) nebulizer solution; Take 3 mLs (2.5 mg total) by nebulization every 6 (six) hours as needed for Wheezing.  -     methylPREDNISolone (MEDROL DOSEPACK) 4 mg tablet; use as directed  -     fluconazole (DIFLUCAN) 150 MG Tab; Take 1 tablet (150 mg total) by mouth once daily. for 1 day

## 2018-10-12 ENCOUNTER — TELEPHONE (OUTPATIENT)
Dept: INTERNAL MEDICINE | Facility: CLINIC | Age: 58
End: 2018-10-12

## 2018-10-12 NOTE — TELEPHONE ENCOUNTER
Spoke with patient, she says that she is not feeling any better and her pulse ox is still running between 89-92%. She would like to know what to do next.

## 2018-10-12 NOTE — TELEPHONE ENCOUNTER
----- Message from Sheri Seugndo sent at 10/12/2018 12:20 PM CDT -----  Contact: [phone]  Patient is calling to give MD update from her urgent care  visit . Please call and advise, Thanks

## 2018-10-13 NOTE — TELEPHONE ENCOUNTER
"Per Dr. Ho's instructions, the patient was advised. Pt verbalized understanding and stated "I'll think about it, and if so, it'll be in the morning". Instructed to contact office if any further questions or concerns.    "

## 2018-11-18 ENCOUNTER — OFFICE VISIT (OUTPATIENT)
Dept: URGENT CARE | Facility: CLINIC | Age: 58
End: 2018-11-18
Payer: COMMERCIAL

## 2018-11-18 VITALS
WEIGHT: 154 LBS | SYSTOLIC BLOOD PRESSURE: 111 MMHG | BODY MASS INDEX: 27.29 KG/M2 | DIASTOLIC BLOOD PRESSURE: 78 MMHG | HEART RATE: 81 BPM | OXYGEN SATURATION: 96 % | TEMPERATURE: 97 F | HEIGHT: 63 IN | RESPIRATION RATE: 19 BRPM

## 2018-11-18 DIAGNOSIS — R06.02 SHORTNESS OF BREATH: ICD-10-CM

## 2018-11-18 DIAGNOSIS — F17.200 SMOKER: ICD-10-CM

## 2018-11-18 DIAGNOSIS — J44.1 CHRONIC OBSTRUCTIVE PULMONARY DISEASE WITH ACUTE EXACERBATION: Primary | ICD-10-CM

## 2018-11-18 PROCEDURE — 94640 AIRWAY INHALATION TREATMENT: CPT | Mod: 59,S$GLB,, | Performed by: NURSE PRACTITIONER

## 2018-11-18 PROCEDURE — 99214 OFFICE O/P EST MOD 30 MIN: CPT | Mod: 25,S$GLB,, | Performed by: NURSE PRACTITIONER

## 2018-11-18 PROCEDURE — 3008F BODY MASS INDEX DOCD: CPT | Mod: CPTII,S$GLB,, | Performed by: NURSE PRACTITIONER

## 2018-11-18 RX ORDER — METHYLPREDNISOLONE 4 MG/1
4 TABLET ORAL DAILY
Qty: 1 PACKAGE | Refills: 0 | Status: SHIPPED | OUTPATIENT
Start: 2018-11-18 | End: 2019-01-14

## 2018-11-18 RX ORDER — ALBUTEROL SULFATE 0.83 MG/ML
2.5 SOLUTION RESPIRATORY (INHALATION)
Status: COMPLETED | OUTPATIENT
Start: 2018-11-18 | End: 2018-11-18

## 2018-11-18 RX ORDER — IPRATROPIUM BROMIDE 0.5 MG/2.5ML
0.5 SOLUTION RESPIRATORY (INHALATION)
Status: COMPLETED | OUTPATIENT
Start: 2018-11-18 | End: 2018-11-18

## 2018-11-18 RX ORDER — DOXYCYCLINE 100 MG/1
100 CAPSULE ORAL 2 TIMES DAILY
Qty: 14 CAPSULE | Refills: 0 | Status: SHIPPED | OUTPATIENT
Start: 2018-11-18 | End: 2018-11-25

## 2018-11-18 RX ORDER — BENZONATATE 100 MG/1
200 CAPSULE ORAL 3 TIMES DAILY PRN
Qty: 40 CAPSULE | Refills: 0 | Status: SHIPPED | OUTPATIENT
Start: 2018-11-18 | End: 2019-01-14

## 2018-11-18 RX ORDER — ALBUTEROL SULFATE 0.63 MG/3ML
0.63 SOLUTION RESPIRATORY (INHALATION)
Status: DISCONTINUED | OUTPATIENT
Start: 2018-11-18 | End: 2018-11-18

## 2018-11-18 RX ADMIN — ALBUTEROL SULFATE 2.5 MG: 0.83 SOLUTION RESPIRATORY (INHALATION) at 02:11

## 2018-11-18 RX ADMIN — IPRATROPIUM BROMIDE 0.5 MG: 0.5 SOLUTION RESPIRATORY (INHALATION) at 02:11

## 2018-11-18 NOTE — PATIENT INSTRUCTIONS
Use inhalers as prescribed. If you worsen you need to go to ER. Follow up with pulmonology at [phone].     You must understand that you've received an Urgent Care treatment only and that you may be released before all your medical problems are known or treated. You, the patient, will arrange for follow up care as instructed.  If your condition worsens we recommend that you receive another evaluation at the emergency room immediately or contact your primary medical clinics after hours call service to discuss your concerns.  Please return here or go to the Emergency Department for any concerns or worsening of condition.      COPD Flare    You have had a flare-up of your COPD.  COPD, or chronic obstructive pulmonary disease, is a common lung disease. It causes your airways to become irritated and narrower. This makes it harder for you to breathe. Emphysema and chronic bronchitis are both types of COPD. This is a chronic condition, which means you always have it. Sometimes it gets worse. When this happens, it is called a flare-up.  Symptoms of COPD  People with COPD may have symptoms most of the time. In a flare-up, your symptoms get worse. These symptoms may mean you are having a flare-up:  · Shortness of breath, shallow or rapid breathing, or wheezing that gets worse  · Lung infection  · Cough that gets worse  · More mucus, thicker mucus or mucus of a different color  · Tiredness, decreased energy, or trouble doing your usual activities  · Fever  · Chest tightness  · Your symptoms dont get better even when you use your usual medicines, inhalers, and nebulizer  · Trouble talking  · You feel confused  Causes of flare-ups  Unfortunately, a flare-up can happen even though you did everything right, and you followed your doctors instructions. Some causes of flare-ups are:  · Smoking or secondhand smoke  · Colds, the flu, or respiratory infections  · Air pollution  · Sudden change in the weather  · Dust, irritating  chemicals, or strong fumes  · Not taking your medicines as prescribed  Home care  Here are some things you can do at home to treat a flare-up:  · Try not to panic. This makes it harder to breathe, and keeps you from doing the right things.  · Dont smoke or be around others who are smoking.  · Try to drink more fluids than usual during a flare-up, unless your doctor has told you not to because of heart and kidney problems. More fluids can help loosen the mucus.  · Use your inhalers and nebulizer, if you have one, as you have been told to.  · If you were given antibiotics, take them until they are used up or your doctor tells you to stop. Its important to finish the antibiotics, even though you feel better. This will make sure the infection has cleared.  · If you were given prednisone or another steroid, finish it even if you feel better.  Preventing a flare-up  Even though flare-ups happen, the best way to treat one is to prevent it before it starts. Here are some pointers:  · Dont smoke or be around others who are smoking.  · Take your medicines as you have been told.  · Talk with your doctor about getting a flu shot every year. Also find out if you need a pneumonia shot.  · If there is a weather advisory warning to stay indoors, try to stay inside when possible.  · Try to eat healthy and get plenty of sleep.  · Try to avoid things that usually set you off, like dust, chemical fumes, hairsprays, or strong perfumes.  Follow-up care  Follow up with your healthcare provider, or as advised.  If a culture was done, you will be told if your treatment needs to be changed. You can call as directed for the results.  If X-rays were done, you will be notified of any new findings that may affect your care.  Call 911  Call 911 if any of these occur:  · You have trouble breathing  · You feel confused or its difficult to wake you up  · You faint or lose consciousness  · You have a rapid heart rate  · You have new pain in your  chest, arm, shoulder, neck or upper back  When to seek medical advice  Call your healthcare provider right away if any of these occur:  · Wheezing or shortness of breath gets worse  · You need to use your inhalers more often than usual without relief  · Fever of 100.4°F (38ºC) or higher, or as directed by your healthcare provider  · Coughing up lots of dark-colored or bloody mucus (sputum)  · Chest pain with each breath  · You do not start to get better within 24 hours  · Swelling of your ankles gets worse  · Dizziness or weakness  Date Last Reviewed: 9/1/2016  © 6957-7435 [employer]. [address] [place] [address], [place] [address]. All rights reserved. This information is not intended as a substitute for professional medical care. Always follow your healthcare professional's instructions.

## 2018-11-18 NOTE — PROGRESS NOTES
"Subjective:       Patient ID: Cassia Kelly is a 58 y.o. female.    Vitals:  height is 5' 3" (1.6 m) and weight is 69.9 kg (154 lb). Her oral temperature is 97 °F (36.1 °C). Her blood pressure is 111/78 and her pulse is 81. Her respiration is 19 and oxygen saturation is 96%.     Chief Complaint: Cough (chest congestion, upper back pain, productive cough, SOB)    Patient has a history of COPD and smoking. She states she is on daily inhaler and uses rescue inhaler at times. She was treated with Levaquin and steroids one month ago. She is not established with a Pulmonologist at this time.       Cough   This is a new problem. Episode onset: one week ago. The problem has been gradually worsening. The problem occurs constantly. The cough is productive of sputum. Associated symptoms include shortness of breath. Pertinent negatives include no chest pain, chills, fever, headaches, myalgias, rash or sore throat. Nothing aggravates the symptoms. Risk factors for lung disease include smoking/tobacco exposure. Treatments tried: Rx medication, dayquil. The treatment provided no relief. Her past medical history is significant for COPD.   Back Pain   This is a new problem. Episode onset: a few weeks ago. The problem occurs constantly. The problem has been gradually worsening since onset. The pain is present in the thoracic spine. The quality of the pain is described as aching and stabbing. The pain does not radiate. The pain is at a severity of 8/10. The pain is severe. The pain is the same all the time. The symptoms are aggravated by coughing. Pertinent negatives include no chest pain, dysuria, fever, headaches or weakness. Treatments tried: ibuprofen. The treatment provided no relief.       Constitution: Negative for chills, fatigue and fever.   HENT: Negative for congestion and sore throat.    Neck: Negative for painful lymph nodes.   Cardiovascular: Negative for chest pain and leg swelling.   Eyes: Negative for double " vision and blurred vision.   Respiratory: Positive for cough, sputum production, COPD and shortness of breath.    Gastrointestinal: Negative for nausea, vomiting and diarrhea.   Genitourinary: Negative for dysuria, frequency, urgency and history of kidney stones.   Musculoskeletal: Positive for back pain. Negative for joint pain, joint swelling, muscle cramps and muscle ache.   Skin: Negative for color change, pale, rash and bruising.   Allergic/Immunologic: Negative for seasonal allergies.   Neurological: Negative for dizziness, history of vertigo, light-headedness, passing out and headaches.   Hematologic/Lymphatic: Negative for swollen lymph nodes.   Psychiatric/Behavioral: Negative for nervous/anxious, sleep disturbance and depression. The patient is not nervous/anxious.        Objective:      Physical Exam   Constitutional: She is oriented to person, place, and time. She appears well-developed and well-nourished. She is cooperative.  Non-toxic appearance. She does not appear ill. No distress.   HENT:   Head: Normocephalic and atraumatic.   Right Ear: Hearing, tympanic membrane, external ear and ear canal normal.   Left Ear: Hearing, tympanic membrane, external ear and ear canal normal.   Nose: Nose normal. No mucosal edema, rhinorrhea or nasal deformity. No epistaxis. Right sinus exhibits no maxillary sinus tenderness and no frontal sinus tenderness. Left sinus exhibits no maxillary sinus tenderness and no frontal sinus tenderness.   Mouth/Throat: Uvula is midline, oropharynx is clear and moist and mucous membranes are normal. No trismus in the jaw. Normal dentition. No uvula swelling. No posterior oropharyngeal erythema.   Eyes: Conjunctivae and lids are normal. No scleral icterus.   Sclera clear bilat   Neck: Trachea normal, full passive range of motion without pain and phonation normal. Neck supple.   Cardiovascular: Normal rate, regular rhythm, normal heart sounds, intact distal pulses and normal pulses.    Pulmonary/Chest: Effort normal. No accessory muscle usage. No tachypnea. No respiratory distress. She has decreased breath sounds in the right upper field, the right middle field, the right lower field, the left upper field, the left middle field and the left lower field.   PRE-TREATMENT:     90  SpO2%       81      HR   Lung Sounds:    POST-TREATMENT:   96  SpO2%        83     HR  Lung Sounds: Still diminished throughout exam but patient states relief post treatment.        Abdominal: Soft. Normal appearance and bowel sounds are normal. She exhibits no distension. There is no tenderness.   Musculoskeletal: Normal range of motion. She exhibits no edema or deformity.   Neurological: She is alert and oriented to person, place, and time. She exhibits normal muscle tone. Coordination normal.   Skin: Skin is warm, dry and intact. She is not diaphoretic. No pallor.   Psychiatric: She has a normal mood and affect. Her speech is normal and behavior is normal. Judgment and thought content normal. Cognition and memory are normal.   Nursing note and vitals reviewed.      Assessment:       1. Chronic obstructive pulmonary disease with acute exacerbation    2. Smoker    3. Shortness of breath        Plan:         Case discussed with Dr. Horvath and agrees with plan of care and management. She understands if she worsens she should seek emergency medical treatment. Patient to follow up with Pulmonology.     Chronic obstructive pulmonary disease with acute exacerbation  -     Ambulatory referral to Pulmonology  -     doxycycline (VIBRAMYCIN) 100 MG Cap; Take 1 capsule (100 mg total) by mouth 2 (two) times daily. for 7 days  Dispense: 14 capsule; Refill: 0  -     methylPREDNISolone (MEDROL DOSEPACK) 4 mg tablet; Take 1 tablet (4 mg total) by mouth once daily. use as directed  Dispense: 1 Package; Refill: 0  -     benzonatate (TESSALON PERLES) 100 MG capsule; Take 2 capsules (200 mg total) by mouth 3 (three) times daily as needed  for Cough.  Dispense: 40 capsule; Refill: 0    Smoker    Shortness of breath  -     Discontinue: albuterol nebulizer solution 0.63 mg  -     ipratropium 0.02 % nebulizer solution 0.5 mg  -     albuterol nebulizer solution 2.5 mg            Patient Instructions   Use inhalers as prescribed. If you worsen you need to go to ER. Follow up with pulmonology at [phone].     You must understand that you've received an Urgent Care treatment only and that you may be released before all your medical problems are known or treated. You, the patient, will arrange for follow up care as instructed.  If your condition worsens we recommend that you receive another evaluation at the emergency room immediately or contact your primary medical clinics after hours call service to discuss your concerns.  Please return here or go to the Emergency Department for any concerns or worsening of condition.      COPD Flare    You have had a flare-up of your COPD.  COPD, or chronic obstructive pulmonary disease, is a common lung disease. It causes your airways to become irritated and narrower. This makes it harder for you to breathe. Emphysema and chronic bronchitis are both types of COPD. This is a chronic condition, which means you always have it. Sometimes it gets worse. When this happens, it is called a flare-up.  Symptoms of COPD  People with COPD may have symptoms most of the time. In a flare-up, your symptoms get worse. These symptoms may mean you are having a flare-up:  · Shortness of breath, shallow or rapid breathing, or wheezing that gets worse  · Lung infection  · Cough that gets worse  · More mucus, thicker mucus or mucus of a different color  · Tiredness, decreased energy, or trouble doing your usual activities  · Fever  · Chest tightness  · Your symptoms dont get better even when you use your usual medicines, inhalers, and nebulizer  · Trouble talking  · You feel confused  Causes of flare-ups  Unfortunately, a flare-up can  happen even though you did everything right, and you followed your doctors instructions. Some causes of flare-ups are:  · Smoking or secondhand smoke  · Colds, the flu, or respiratory infections  · Air pollution  · Sudden change in the weather  · Dust, irritating chemicals, or strong fumes  · Not taking your medicines as prescribed  Home care  Here are some things you can do at home to treat a flare-up:  · Try not to panic. This makes it harder to breathe, and keeps you from doing the right things.  · Dont smoke or be around others who are smoking.  · Try to drink more fluids than usual during a flare-up, unless your doctor has told you not to because of heart and kidney problems. More fluids can help loosen the mucus.  · Use your inhalers and nebulizer, if you have one, as you have been told to.  · If you were given antibiotics, take them until they are used up or your doctor tells you to stop. Its important to finish the antibiotics, even though you feel better. This will make sure the infection has cleared.  · If you were given prednisone or another steroid, finish it even if you feel better.  Preventing a flare-up  Even though flare-ups happen, the best way to treat one is to prevent it before it starts. Here are some pointers:  · Dont smoke or be around others who are smoking.  · Take your medicines as you have been told.  · Talk with your doctor about getting a flu shot every year. Also find out if you need a pneumonia shot.  · If there is a weather advisory warning to stay indoors, try to stay inside when possible.  · Try to eat healthy and get plenty of sleep.  · Try to avoid things that usually set you off, like dust, chemical fumes, hairsprays, or strong perfumes.  Follow-up care  Follow up with your healthcare provider, or as advised.  If a culture was done, you will be told if your treatment needs to be changed. You can call as directed for the results.  If X-rays were done, you will be notified of  any new findings that may affect your care.  Call 911  Call 911 if any of these occur:  · You have trouble breathing  · You feel confused or its difficult to wake you up  · You faint or lose consciousness  · You have a rapid heart rate  · You have new pain in your chest, arm, shoulder, neck or upper back  When to seek medical advice  Call your healthcare provider right away if any of these occur:  · Wheezing or shortness of breath gets worse  · You need to use your inhalers more often than usual without relief  · Fever of 100.4°F (38ºC) or higher, or as directed by your healthcare provider  · Coughing up lots of dark-colored or bloody mucus (sputum)  · Chest pain with each breath  · You do not start to get better within 24 hours  · Swelling of your ankles gets worse  · Dizziness or weakness  Date Last Reviewed: 9/1/2016  © 6045-3376 [employer]. [address] [place] [address], [place] [address]. All rights reserved. This information is not intended as a substitute for professional medical care. Always follow your healthcare professional's instructions.

## 2018-11-22 ENCOUNTER — HOSPITAL ENCOUNTER (EMERGENCY)
Facility: HOSPITAL | Age: 58
Discharge: HOME | End: 2018-11-22
Attending: EMERGENCY MEDICINE
Payer: COMMERCIAL

## 2018-11-22 VITALS
RESPIRATION RATE: 20 BRPM | SYSTOLIC BLOOD PRESSURE: 136 MMHG | WEIGHT: 154 LBS | TEMPERATURE: 98 F | BODY MASS INDEX: 27.29 KG/M2 | HEIGHT: 63 IN | DIASTOLIC BLOOD PRESSURE: 71 MMHG | OXYGEN SATURATION: 95 % | HEART RATE: 77 BPM

## 2018-11-22 DIAGNOSIS — M54.9 BACK PAIN: ICD-10-CM

## 2018-11-22 DIAGNOSIS — J44.1 COPD EXACERBATION: ICD-10-CM

## 2018-11-22 DIAGNOSIS — R93.89 ABNORMAL CHEST X-RAY: Primary | ICD-10-CM

## 2018-11-22 PROCEDURE — 93010 ELECTROCARDIOGRAM REPORT: CPT | Mod: ,,, | Performed by: INTERNAL MEDICINE

## 2018-11-22 PROCEDURE — 99284 EMERGENCY DEPT VISIT MOD MDM: CPT | Mod: 25

## 2018-11-22 PROCEDURE — 99284 EMERGENCY DEPT VISIT MOD MDM: CPT | Mod: ,,, | Performed by: EMERGENCY MEDICINE

## 2018-11-22 PROCEDURE — 63600175 PHARM REV CODE 636 W HCPCS: Performed by: PHYSICIAN ASSISTANT

## 2018-11-22 PROCEDURE — 96374 THER/PROPH/DIAG INJ IV PUSH: CPT

## 2018-11-22 PROCEDURE — 93005 ELECTROCARDIOGRAM TRACING: CPT

## 2018-11-22 RX ORDER — KETOROLAC TROMETHAMINE 30 MG/ML
10 INJECTION, SOLUTION INTRAMUSCULAR; INTRAVENOUS
Status: COMPLETED | OUTPATIENT
Start: 2018-11-22 | End: 2018-11-22

## 2018-11-22 RX ADMIN — KETOROLAC TROMETHAMINE 10 MG: 30 INJECTION, SOLUTION INTRAMUSCULAR at 07:11

## 2018-11-23 ENCOUNTER — TELEPHONE (OUTPATIENT)
Dept: INTERNAL MEDICINE | Facility: CLINIC | Age: 58
End: 2018-11-23

## 2018-11-23 NOTE — ED TRIAGE NOTES
Patient came in today for increased pain in upper back that has started on Sunday.  States pain with deep breath, cough.  Denies fever

## 2018-11-23 NOTE — ED PROVIDER NOTES
Encounter Date: 2018       History     Chief Complaint   Patient presents with    Back Pain     upper back pain seen , meds not working, hurts to breathe     This is a 58 year old female with a PMH of COPD who presents to the ED with a chief complaint of back pain. She reports a 10 day history of bilateral thoracic back pain, which has been constant since the onset. The symptoms are exacerbated by coughing, inspiration, movement or direct palpation. She reports a cough with scant clear sputum production. She was treated for a COPD exacerbation in October, doesn't feel that her symptoms fully resolved. Was seen in urgent care 4 days ago and started on Doxycycline. She was provided a script for medrol dose judy but has not started this yet, as she was directed to only use if needed. Her pain is 7/10. She denies fever, chills, nasal congestion or PND, chest pain, nausea/vomiting, diaphoresis, abdominal pain, dysuria, peripheral edema. She is a smoker.           Review of patient's allergies indicates:   Allergen Reactions    Penicillins Rash     Past Medical History:   Diagnosis Date    Abnormal Pap smear     [facility]    Acute cystitis 2018    Adenomatous polyp of colon 2015    Repeat c-scope 5 yrs     Cervical cancer     COPD (chronic obstructive pulmonary disease)     Microscopic hematuria 2013    Smoker 2015    STD (sexually transmitted disease)     Ureteral calculus, left 2013     Past Surgical History:   Procedure Laterality Date    APPENDECTOMY       SECTION      x1    COLONOSCOPY N/A 2015    Performed by Milan Chatterjee MD at [facility] (4TH FLR)    HYSTERECTOMY      LAVERN/LSO (Abn Pap)    OOPHORECTOMY      Vocal chord nodules removed       Family History   Problem Relation Age of Onset    Hypertension Father     Hypertension Mother     Heart disease Sister         arrhy    Heart disease Brother         MI    Cancer Paternal Aunt          colon ?    Melanoma Neg Hx     Psoriasis Neg Hx     Lupus Neg Hx      Social History     Tobacco Use    Smoking status: Current Some Day Smoker     Packs/day: 1.00     Types: Cigarettes    Smokeless tobacco: Never Used   Substance Use Topics    Alcohol use: No    Drug use: Not on file     Review of Systems   Constitutional: Negative for chills and fever.   HENT: Negative for sore throat.    Respiratory: Positive for cough. Negative for shortness of breath.    Cardiovascular: Negative for chest pain.   Gastrointestinal: Negative for nausea and vomiting.   Genitourinary: Negative for dysuria.   Musculoskeletal: Positive for back pain.   Skin: Negative for rash.   Neurological: Negative for weakness.   Hematological: Does not bruise/bleed easily.       Physical Exam     Initial Vitals [11/22/18 1822]   BP Pulse Resp Temp SpO2   (!) 140/84 86 18 98 °F (36.7 °C) 97 %      MAP       --         Physical Exam    Constitutional: She appears well-developed and well-nourished. No distress.   HENT:   Head: Atraumatic.   Mouth/Throat: Mucous membranes are dry. Posterior oropharyngeal erythema present. No posterior oropharyngeal edema.   Eyes: Conjunctivae and EOM are normal. Pupils are equal, round, and reactive to light.   Cardiovascular: Normal rate, regular rhythm, normal heart sounds and intact distal pulses.   No edema.   Pulmonary/Chest: No respiratory distress. She has wheezes. She has no rhonchi. She has no rales.   Abdominal: Soft. Bowel sounds are normal. There is no tenderness. There is no rebound and no guarding.   Neurological: She is alert and oriented to person, place, and time.   Skin: Skin is warm and dry. No rash noted.         ED Course   Procedures  Labs Reviewed - No data to display       Imaging Results          X-Ray Chest PA And Lateral (Final result)     Abnormal  Result time 11/22/18 19:33:32    Final result by Musa Breen MD (11/22/18 19:33:32)                 Impression:      No acute  intrathoracic process.    1.1 cm nodular opacity seen projecting over the mid left lung field on the PA view.  This is not identified on the prior exam.  Suggest follow-up nonemergent CT chest for further assessment.    This report was flagged in Epic as abnormal.    Electronically signed by resident: Lashaun Monahan  Date:    11/22/2018  Time:    19:16    Electronically signed by: Musa Breen MD  Date:    11/22/2018  Time:    19:33             Narrative:    EXAMINATION:  XR CHEST PA AND LATERAL    CLINICAL HISTORY:  Dorsalgia, unspecified    TECHNIQUE:  PA and lateral views of the chest were performed.    COMPARISON:  Chest radiograph 01/24/2018    FINDINGS:  Lungs are symmetrically expanded and clear.  Biapical pleural thickening/scarring.  No large consolidation, pleural effusion or pneumothorax.    1.1 cm nodular opacity seen projecting over the mid left lung field on the PA view.    Cardiomediastinal silhouette is within normal limits.    No acute osseous process.                                       APC / Resident Notes:   58-year-old female presenting with back pain and cough.  On exam she is afebrile and nontoxic.  Heart rate and rhythm regular.  She has faint expiratory wheezes in the left upper lung and decreased breath sounds in the right midlung.  HEENT exam with dry oral mucosa and mild posterior oropharyngeal erythema.  No exudate or edema.    DDx includes but is not limited to COPD exacerbation, pneumonia, costochondritis, musculoskeletal back pain. I considered but do not suspect PE or ACS.    CXR with no acute process. New 1.1cm nodular opacity projecting over the left lung.  Discussed findings with patient - given hx of smoking recommend prompt outpatient f/u with PCP for further non-emergent evaluation of this. She verbalized understanding. Advised her to start medrol dose judy, continue with nebs as directed. Return to ED precautions discussed. She is stable for discharge. I discussed the care  of this patient with my supervising MD.                    Clinical Impression:   The primary encounter diagnosis was Abnormal chest x-ray. Diagnoses of Back pain and COPD exacerbation were also pertinent to this visit.      Disposition:   Disposition: Discharged  Condition: Stable                        Madina Espitia PA-C  11/22/18 7910

## 2018-11-23 NOTE — ED NOTES
Cassia Kelly, a 58 y.o. female presents to the ED via personal transportation with CC increased pain in her upper back. Patient went to the urgent care Sunday for increased pain and SOB.  Patient is taking medication as prescribed.        Patient identifiers verified verbally with patient and correct for Cassia Kelly.    LOC/ APPEARANCE: The patient is AAOx4. Pt is speaking appropriately, no slurred speech. Pt changed into hospital gown.  Pt reports pain level of 7 when sitting and level 10 with cough.   SKIN: Skin is warm dry and intact, and color is consistent with ethnicity. Capillary refill <3 seconds. No breakdown or brusing visible. Mucus membranes moist, acyanotic.  RESPIRATORY: COPD patient with mild cough, denies change in the color of sputum, does not having increased SOB from her normal.  No accessory muscle use noted.   CARDIAC: Patient has regular heart rate.  No peripheral edema noted, and patient has no c/o chest pain. Peripheral pulses present equal and strong throughout.  ABDOMEN: Soft and non-tender to palpation with no distention noted. Normoactive bowel sounds x4 quadrants. Pt has no complaints of abnormal bowel movements, denies blood in stool. Pt reports normal appetite.   NEUROLOGIC: Eyes open spontaneously and facial expression symmetrical. Pt behavior appropriate to situation, and pt follows commands. Pt reports sensation present in all extremities when touched with a finger, denies any numbness or tingling. PERRLA  MUSCULOSKELETAL: Spontaneous movement noted to all extremities. Hand  equal and leg strength strong +5 bilaterally.   : No complaints of frequency, burning, urgency or blood in the urine. No complaints of incontinence.

## 2018-11-23 NOTE — TELEPHONE ENCOUNTER
----- Message from Sahara Rowland sent at 11/23/2018  9:01 AM CST -----  Contact: Patient [phone](home)   [phone] (cell)  Pt is calling to speak with someone in the office. She states that she went to the e.r and was told to f/u with her PCP to schedule a cat scan due to a spot that was found during an x-ray that was taken. Please call back and advise.      Thanks

## 2018-11-23 NOTE — TELEPHONE ENCOUNTER
----- Message from Sahara Rowland sent at 11/23/2018  9:01 AM CST -----  Contact: Patient [phone](home)   [phone] (cell)  Pt is calling to speak with someone in the office. She states that she went to the e.r and was told to f/u with her PCP to schedule a cat scan due to a spot that was found during an x-ray that was taken. Please call back and advise.      Thanks

## 2018-11-26 ENCOUNTER — TELEPHONE (OUTPATIENT)
Dept: INTERNAL MEDICINE | Facility: CLINIC | Age: 58
End: 2018-11-26

## 2018-11-26 DIAGNOSIS — Z72.0 TOBACCO USE: ICD-10-CM

## 2018-11-26 DIAGNOSIS — R91.1 LUNG NODULE: ICD-10-CM

## 2018-11-26 NOTE — TELEPHONE ENCOUNTER
----- Message from Dominique Givens sent at 11/26/2018  3:50 PM CST -----  Contact: [phone]  Patient is requesting a call from Britt in regards to a spot that was found during her x-ray..    Please advise, thank you

## 2018-11-29 NOTE — TELEPHONE ENCOUNTER
Good Morning, Called and spoke with the patient she has been scheduled for a CT scan and Pulmonary on 12/04    Thank you

## 2018-12-04 ENCOUNTER — HOSPITAL ENCOUNTER (OUTPATIENT)
Dept: PULMONOLOGY | Facility: CLINIC | Age: 58
Discharge: HOME | End: 2018-12-04
Payer: COMMERCIAL

## 2018-12-04 ENCOUNTER — HOSPITAL ENCOUNTER (OUTPATIENT)
Dept: RADIOLOGY | Facility: HOSPITAL | Age: 58
Discharge: HOME | End: 2018-12-04
Attending: INTERNAL MEDICINE
Payer: COMMERCIAL

## 2018-12-04 ENCOUNTER — OFFICE VISIT (OUTPATIENT)
Dept: PULMONOLOGY | Facility: CLINIC | Age: 58
End: 2018-12-04
Payer: COMMERCIAL

## 2018-12-04 VITALS
HEIGHT: 63 IN | HEART RATE: 72 BPM | BODY MASS INDEX: 27.85 KG/M2 | SYSTOLIC BLOOD PRESSURE: 130 MMHG | OXYGEN SATURATION: 95 % | DIASTOLIC BLOOD PRESSURE: 78 MMHG | WEIGHT: 157.19 LBS

## 2018-12-04 VITALS — BODY MASS INDEX: 26.84 KG/M2 | WEIGHT: 157.19 LBS | HEIGHT: 64 IN

## 2018-12-04 DIAGNOSIS — J44.9 CHRONIC OBSTRUCTIVE PULMONARY DISEASE, UNSPECIFIED COPD TYPE: ICD-10-CM

## 2018-12-04 DIAGNOSIS — J44.9 CHRONIC OBSTRUCTIVE PULMONARY DISEASE, UNSPECIFIED COPD TYPE: Primary | ICD-10-CM

## 2018-12-04 DIAGNOSIS — R91.8 ABNORMALITY OF LUNG ON CXR: ICD-10-CM

## 2018-12-04 DIAGNOSIS — R91.8 ABNORMAL FINDING ON LUNG IMAGING: ICD-10-CM

## 2018-12-04 DIAGNOSIS — Z72.0 TOBACCO ABUSE DISORDER: ICD-10-CM

## 2018-12-04 DIAGNOSIS — R91.1 LUNG NODULE: ICD-10-CM

## 2018-12-04 DIAGNOSIS — Z72.0 TOBACCO USE: ICD-10-CM

## 2018-12-04 LAB
POST FEV1 FVC: 0.49
POST FEV1: 1.27
POST FVC: 2.61
PRE FEV1 FVC: 50
PRE FEV1: 1.27
PRE FVC: 2.53
PREDICTED FEV1 FVC: 80
PREDICTED FEV1: 2.42
PREDICTED FVC: 3.02

## 2018-12-04 PROCEDURE — 71250 CT THORAX DX C-: CPT | Mod: TC

## 2018-12-04 PROCEDURE — 94729 DIFFUSING CAPACITY: CPT | Mod: S$GLB,,, | Performed by: INTERNAL MEDICINE

## 2018-12-04 PROCEDURE — 99204 OFFICE O/P NEW MOD 45 MIN: CPT | Mod: S$GLB,,, | Performed by: INTERNAL MEDICINE

## 2018-12-04 PROCEDURE — 94060 EVALUATION OF WHEEZING: CPT | Mod: S$GLB,,, | Performed by: INTERNAL MEDICINE

## 2018-12-04 PROCEDURE — 99999 PR PBB SHADOW E&M-EST. PATIENT-LVL III: CPT | Mod: PBBFAC,,, | Performed by: INTERNAL MEDICINE

## 2018-12-04 PROCEDURE — 3008F BODY MASS INDEX DOCD: CPT | Mod: CPTII,S$GLB,, | Performed by: INTERNAL MEDICINE

## 2018-12-04 PROCEDURE — 94618 PULMONARY STRESS TESTING: CPT | Mod: S$GLB,,, | Performed by: INTERNAL MEDICINE

## 2018-12-04 PROCEDURE — 71250 CT THORAX DX C-: CPT | Mod: 26,,, | Performed by: RADIOLOGY

## 2018-12-04 PROCEDURE — 94727 GAS DIL/WSHOT DETER LNG VOL: CPT | Mod: S$GLB,,, | Performed by: INTERNAL MEDICINE

## 2018-12-04 RX ORDER — ALBUTEROL SULFATE 90 UG/1
2 AEROSOL, METERED RESPIRATORY (INHALATION) EVERY 4 HOURS PRN
Qty: 1 INHALER | Refills: 3 | Status: SHIPPED | OUTPATIENT
Start: 2018-12-04 | End: 2019-07-11 | Stop reason: SDUPTHER

## 2018-12-04 NOTE — ASSESSMENT & PLAN NOTE
· PFTs show moderate COPD  · Discontinue Advair and Combivent  · Start Trelegy 1 puff daily (rinse mouth/throat afterwards)  · Continue albuterol prn  · Nebulized albuterol as needed  · Referral to pulmonary rehab  · No exertional hypoxemia noted on 6MWT

## 2018-12-04 NOTE — PATIENT INSTRUCTIONS
COPD (chronic obstructive pulmonary disease)  · Discontinue Advair and Combivent  · Start Trelegy 1 puff daily (rinse mouth/throat afterwards)  · Continue albuterol prn  · Nebulized albuterol as needed  · Referral to pulmonary rehab

## 2018-12-04 NOTE — ASSESSMENT & PLAN NOTE
Significant smoking history.  Contemplative stage.  Received nicotine patches through the smoking cessation group, but did not follow up with support.  May benefit from chantix or wellbutrin.  Recommend follow up with [facility].  Concerned overall breathing will not improve unless she can stop smoking.

## 2018-12-04 NOTE — LETTER
December 4, 2018      Marco A Rondon MD  [address]  [facility] [address]           [facility]  [address]  [facility] [address]  Phone: [phone]          Patient: Cassia Kelly   MR Number: [id number]   YOB: 1960   Date of Visit: 12/4/2018       Dear Dr. Marco A Rondon:    Thank you for referring Cassia Kelly to me for evaluation. Attached you will find relevant portions of my assessment and plan of care.    If you have questions, please do not hesitate to call me. I look forward to following Cassia Kelly along with you.    Sincerely,    Erna Park MD    Enclosure  CC:  No Recipients    If you would like to receive this communication electronically, please contact [email or web address] or [phone] to request more information on [email or web address] Link access.    For providers and/or their staff who would like to refer a patient to [facility], please contact us through our one-stop-shop provider referral line, [facility], at [phone].    If you feel you have received this communication in error or would no longer like to receive these types of communications, please e-mail [email or web address]

## 2018-12-04 NOTE — PROCEDURES
Cassia Kelly is a 58 y.o.  female patient, who presents for a 6 minute walk test ordered by MD Vivian.  The diagnosis is COPD.  The patient's BMI is 27.5 kg/m2.  Predicted distance (lower limit of normal) is 356.6 meters.      Test Results:    The test was completed without stopping.    The total time walked was 360 seconds.  During walking, the patient reported:  No complaints. The patient used no assistive devices  during testing.     12/04/2018---------Distance: 340.77 meters (1118 feet)     O2 Sat % Supplemental Oxygen Heart Rate Blood Pressure Jacob Scale   Pre-exercise  (Resting) 98 % Room Air 77 bpm 125/71 mmHg 2   During Exercise 95 % Room Air 89 bpm 143/78 mmHg 3   Post-exercise  (Recovery) 97 % Room Air  81 bpm   mmHg       Recovery Time: 67 seconds    Performing nurse/tech: Quintin ORR      PREVIOUS STUDY:   The patient has not had a previous study.      CLINICAL INTERPRETATION:  Six minute walk distance is 340.77 meters (1118 feet) with moderate dyspnea.  During exercise, there was significant desaturation while breathing room air.  Both blood pressure and heart rate remained stable with walking.  The patient did not report non-pulmonary symptoms during exercise.  No previous study performed.  Based upon age and body mass index, exercise capacity is less than predicted.

## 2019-01-14 ENCOUNTER — OFFICE VISIT (OUTPATIENT)
Dept: INTERNAL MEDICINE | Facility: CLINIC | Age: 59
End: 2019-01-14
Payer: COMMERCIAL

## 2019-01-14 VITALS
HEART RATE: 84 BPM | BODY MASS INDEX: 27.7 KG/M2 | DIASTOLIC BLOOD PRESSURE: 78 MMHG | HEIGHT: 63 IN | SYSTOLIC BLOOD PRESSURE: 122 MMHG | OXYGEN SATURATION: 93 % | WEIGHT: 156.31 LBS | TEMPERATURE: 98 F

## 2019-01-14 DIAGNOSIS — J44.1 CHRONIC OBSTRUCTIVE PULMONARY DISEASE WITH ACUTE EXACERBATION: ICD-10-CM

## 2019-01-14 DIAGNOSIS — R05.9 COUGH: Primary | ICD-10-CM

## 2019-01-14 DIAGNOSIS — J40 BRONCHITIS: ICD-10-CM

## 2019-01-14 PROCEDURE — 99999 PR PBB SHADOW E&M-EST. PATIENT-LVL III: CPT | Mod: PBBFAC,,, | Performed by: NURSE PRACTITIONER

## 2019-01-14 PROCEDURE — 99214 OFFICE O/P EST MOD 30 MIN: CPT | Mod: S$GLB,,, | Performed by: NURSE PRACTITIONER

## 2019-01-14 PROCEDURE — 3008F BODY MASS INDEX DOCD: CPT | Mod: CPTII,S$GLB,, | Performed by: NURSE PRACTITIONER

## 2019-01-14 PROCEDURE — 3008F PR BODY MASS INDEX (BMI) DOCUMENTED: ICD-10-PCS | Mod: CPTII,S$GLB,, | Performed by: NURSE PRACTITIONER

## 2019-01-14 PROCEDURE — 99214 PR OFFICE/OUTPT VISIT, EST, LEVL IV, 30-39 MIN: ICD-10-PCS | Mod: S$GLB,,, | Performed by: NURSE PRACTITIONER

## 2019-01-14 PROCEDURE — 99999 PR PBB SHADOW E&M-EST. PATIENT-LVL III: ICD-10-PCS | Mod: PBBFAC,,, | Performed by: NURSE PRACTITIONER

## 2019-01-14 RX ORDER — BENZONATATE 200 MG/1
200 CAPSULE ORAL 3 TIMES DAILY PRN
Qty: 30 CAPSULE | Refills: 0 | Status: SHIPPED | OUTPATIENT
Start: 2019-01-14 | End: 2019-01-24

## 2019-01-14 RX ORDER — ALBUTEROL SULFATE 0.83 MG/ML
2.5 SOLUTION RESPIRATORY (INHALATION) EVERY 6 HOURS PRN
Qty: 50 BOX | Refills: 2 | Status: SHIPPED | OUTPATIENT
Start: 2019-01-14 | End: 2019-07-11 | Stop reason: SDUPTHER

## 2019-01-14 RX ORDER — DOXYCYCLINE HYCLATE 100 MG
100 TABLET ORAL EVERY 12 HOURS
Qty: 20 TABLET | Refills: 0 | Status: SHIPPED | OUTPATIENT
Start: 2019-01-14 | End: 2019-01-24

## 2019-01-14 NOTE — LETTER
January 14, 2019      [facility]  [address]  [facility] [address]  Phone: [phone]  Fax: [phone]       Patient: Cassia Kelly   YOB: 1960  Date of Visit: 01/14/2019    To Whom It May Concern:    Shanelle Kelly  was at [facility] on 01/14/2019.  If you have any questions or concerns, or if I can be of further assistance, please do not hesitate to contact me.    Sincerely,        Julián Sy NP

## 2019-01-14 NOTE — PROGRESS NOTES
Subjective:       Patient ID: Cassia Kelly is a 58 y.o. female.    Chief Complaint: Cough (x5 days) and Chest Congestion    HPI:  57 yo female that presents to clinic with complaints of cough and congestion.    States that symptoms have been going on for over a week and have been getting progressively worse.  States increase green mucus production.  Patient reports history of COPD and states that she still continues to smoke.  States that she has not smoked over the past three days and is currently wearing nicotine patch.  Denies any fever, chest pain, n/v or dizziness.  States that she has had intermittent episodes of SOB.  States that she is using a daily trelegy inhaler and also using an albuterol inhaler as needed.    States that she is followed by pulmonology and had previous PFTs, chest xray and chest CT done within the past month.        Review of Systems   Constitutional: Positive for appetite change and fatigue. Negative for activity change and fever.   HENT: Positive for congestion, postnasal drip and rhinorrhea. Negative for sinus pressure, sinus pain and sore throat.    Respiratory: Positive for cough and shortness of breath. Negative for apnea and wheezing.    Cardiovascular: Negative for chest pain, palpitations and leg swelling.   Gastrointestinal: Negative for abdominal distention, abdominal pain, constipation, diarrhea, nausea and vomiting.   Musculoskeletal: Negative for arthralgias, back pain, myalgias, neck pain and neck stiffness.   Skin: Negative for color change and rash.   Neurological: Negative for dizziness, light-headedness, numbness and headaches.   Psychiatric/Behavioral: Negative for behavioral problems.       Objective:      Physical Exam   Constitutional: She is oriented to person, place, and time. She appears well-developed and well-nourished. She appears distressed.   HENT:   Head: Normocephalic and atraumatic.   Right Ear: External ear normal.   Left Ear: External ear  normal.   + post nasal drip and mild erythema to oropharynx.   Neck: Normal range of motion. Neck supple. No thyromegaly present.   Cardiovascular: Normal rate, regular rhythm, normal heart sounds and intact distal pulses.   No murmur heard.  Pulmonary/Chest: Effort normal and breath sounds normal. No stridor. No respiratory distress. She has no wheezes. She has no rales.   Lymphadenopathy:     She has no cervical adenopathy.   Neurological: She is alert and oriented to person, place, and time.   Skin: Skin is dry. No erythema.   Psychiatric: Her behavior is normal.       Assessment:       1. Cough    2. Chronic obstructive pulmonary disease with acute exacerbation    3. Bronchitis        Plan:     1.  Cough  -Vitals are stable in clinic.  -Will give prescription for tessalon perls to help with cough.  -Encouraged to increase water intake and get plenty of rest.    2.  COPD/bronchitis  -Given mucus production over the past few days will go ahead and prescribe a course of doxycyline 100mg po bid x 10 days.  -Encouraged to increase water intake and get plenty of rest.  -Encouraged to continue working on smoking cessation.  -Can use albuterol inhaler as needed for SOB or wheeze.  -Encouraged to follow up with pulmonology as directed.

## 2019-02-04 ENCOUNTER — HOSPITAL ENCOUNTER (INPATIENT)
Facility: HOSPITAL | Age: 59
LOS: 3 days | Discharge: HOME | DRG: 189 | End: 2019-02-07
Attending: EMERGENCY MEDICINE | Admitting: STUDENT IN AN ORGANIZED HEALTH CARE EDUCATION/TRAINING PROGRAM
Payer: COMMERCIAL

## 2019-02-04 ENCOUNTER — OFFICE VISIT (OUTPATIENT)
Dept: INTERNAL MEDICINE | Facility: CLINIC | Age: 59
End: 2019-02-04
Payer: COMMERCIAL

## 2019-02-04 VITALS
WEIGHT: 155 LBS | OXYGEN SATURATION: 92 % | HEIGHT: 63 IN | DIASTOLIC BLOOD PRESSURE: 70 MMHG | BODY MASS INDEX: 27.46 KG/M2 | SYSTOLIC BLOOD PRESSURE: 130 MMHG | HEART RATE: 85 BPM

## 2019-02-04 DIAGNOSIS — R06.02 SHORTNESS OF BREATH: ICD-10-CM

## 2019-02-04 DIAGNOSIS — Z72.0 TOBACCO ABUSE DISORDER: ICD-10-CM

## 2019-02-04 DIAGNOSIS — J44.9 COPD (CHRONIC OBSTRUCTIVE PULMONARY DISEASE): Primary | ICD-10-CM

## 2019-02-04 DIAGNOSIS — J44.1 COPD EXACERBATION: Primary | ICD-10-CM

## 2019-02-04 DIAGNOSIS — J44.1 COPD EXACERBATION: ICD-10-CM

## 2019-02-04 DIAGNOSIS — J44.1 CHRONIC OBSTRUCTIVE PULMONARY DISEASE WITH ACUTE EXACERBATION: ICD-10-CM

## 2019-02-04 LAB
ANION GAP SERPL CALC-SCNC: 11 MMOL/L
BASOPHILS # BLD AUTO: 0.05 K/UL
BASOPHILS NFR BLD: 0.5 %
BUN SERPL-MCNC: 12 MG/DL
CALCIUM SERPL-MCNC: 10 MG/DL
CHLORIDE SERPL-SCNC: 104 MMOL/L
CO2 SERPL-SCNC: 25 MMOL/L
CREAT SERPL-MCNC: 0.7 MG/DL
DIFFERENTIAL METHOD: ABNORMAL
EOSINOPHIL # BLD AUTO: 0.1 K/UL
EOSINOPHIL NFR BLD: 0.9 %
ERYTHROCYTE [DISTWIDTH] IN BLOOD BY AUTOMATED COUNT: 13 %
EST. GFR  (AFRICAN AMERICAN): >60 ML/MIN/1.73 M^2
EST. GFR  (NON AFRICAN AMERICAN): >60 ML/MIN/1.73 M^2
GLUCOSE SERPL-MCNC: 114 MG/DL
HCT VFR BLD AUTO: 46.3 %
HGB BLD-MCNC: 14.8 G/DL
IMM GRANULOCYTES # BLD AUTO: 0.05 K/UL
IMM GRANULOCYTES NFR BLD AUTO: 0.5 %
LYMPHOCYTES # BLD AUTO: 1.3 K/UL
LYMPHOCYTES NFR BLD: 12.3 %
MCH RBC QN AUTO: 30.1 PG
MCHC RBC AUTO-ENTMCNC: 32 G/DL
MCV RBC AUTO: 94 FL
MONOCYTES # BLD AUTO: 0.2 K/UL
MONOCYTES NFR BLD: 1.9 %
NEUTROPHILS # BLD AUTO: 8.8 K/UL
NEUTROPHILS NFR BLD: 83.9 %
NRBC BLD-RTO: 0 /100 WBC
PLATELET # BLD AUTO: 249 K/UL
PMV BLD AUTO: 11.6 FL
POTASSIUM SERPL-SCNC: 3.9 MMOL/L
RBC # BLD AUTO: 4.92 M/UL
SODIUM SERPL-SCNC: 140 MMOL/L
WBC # BLD AUTO: 10.49 K/UL

## 2019-02-04 PROCEDURE — 99214 PR OFFICE/OUTPT VISIT, EST, LEVL IV, 30-39 MIN: ICD-10-PCS | Mod: 25,S$GLB,, | Performed by: PHYSICIAN ASSISTANT

## 2019-02-04 PROCEDURE — 99285 EMERGENCY DEPT VISIT HI MDM: CPT | Mod: ,,, | Performed by: PHYSICIAN ASSISTANT

## 2019-02-04 PROCEDURE — 25000242 PHARM REV CODE 250 ALT 637 W/ HCPCS: Performed by: STUDENT IN AN ORGANIZED HEALTH CARE EDUCATION/TRAINING PROGRAM

## 2019-02-04 PROCEDURE — 99999 PR PBB SHADOW E&M-EST. PATIENT-LVL III: CPT | Mod: PBBFAC,,, | Performed by: PHYSICIAN ASSISTANT

## 2019-02-04 PROCEDURE — 99999 PR PBB SHADOW E&M-EST. PATIENT-LVL III: ICD-10-PCS | Mod: PBBFAC,,, | Performed by: PHYSICIAN ASSISTANT

## 2019-02-04 PROCEDURE — 96372 PR INJECTION,THERAP/PROPH/DIAG2ST, IM OR SUBCUT: ICD-10-PCS | Mod: S$GLB,,, | Performed by: PHYSICIAN ASSISTANT

## 2019-02-04 PROCEDURE — 80048 BASIC METABOLIC PNL TOTAL CA: CPT

## 2019-02-04 PROCEDURE — 96372 THER/PROPH/DIAG INJ SC/IM: CPT | Mod: S$GLB,,, | Performed by: PHYSICIAN ASSISTANT

## 2019-02-04 PROCEDURE — 85025 COMPLETE CBC W/AUTO DIFF WBC: CPT

## 2019-02-04 PROCEDURE — 93010 ELECTROCARDIOGRAM REPORT: CPT | Mod: ,,, | Performed by: INTERNAL MEDICINE

## 2019-02-04 PROCEDURE — 99222 PR INITIAL HOSPITAL CARE,LEVL II: ICD-10-PCS | Mod: ,,, | Performed by: INTERNAL MEDICINE

## 2019-02-04 PROCEDURE — 3008F BODY MASS INDEX DOCD: CPT | Mod: CPTII,S$GLB,, | Performed by: PHYSICIAN ASSISTANT

## 2019-02-04 PROCEDURE — 94640 AIRWAY INHALATION TREATMENT: CPT | Mod: 59,S$GLB,, | Performed by: PHYSICIAN ASSISTANT

## 2019-02-04 PROCEDURE — 99285 EMERGENCY DEPT VISIT HI MDM: CPT | Mod: 25

## 2019-02-04 PROCEDURE — 99222 1ST HOSP IP/OBS MODERATE 55: CPT | Mod: ,,, | Performed by: INTERNAL MEDICINE

## 2019-02-04 PROCEDURE — 93005 ELECTROCARDIOGRAM TRACING: CPT

## 2019-02-04 PROCEDURE — 3008F PR BODY MASS INDEX (BMI) DOCUMENTED: ICD-10-PCS | Mod: CPTII,S$GLB,, | Performed by: PHYSICIAN ASSISTANT

## 2019-02-04 PROCEDURE — 94640 PR INHAL RX, AIRWAY OBST/DX SPUTUM INDUCT: ICD-10-PCS | Mod: 59,S$GLB,, | Performed by: PHYSICIAN ASSISTANT

## 2019-02-04 PROCEDURE — 99214 OFFICE O/P EST MOD 30 MIN: CPT | Mod: 25,S$GLB,, | Performed by: PHYSICIAN ASSISTANT

## 2019-02-04 PROCEDURE — 94640 AIRWAY INHALATION TREATMENT: CPT

## 2019-02-04 PROCEDURE — 11000001 HC ACUTE MED/SURG PRIVATE ROOM

## 2019-02-04 PROCEDURE — 99285 PR EMERGENCY DEPT VISIT,LEVEL V: ICD-10-PCS | Mod: ,,, | Performed by: PHYSICIAN ASSISTANT

## 2019-02-04 PROCEDURE — 93010 EKG 12-LEAD: ICD-10-PCS | Mod: ,,, | Performed by: INTERNAL MEDICINE

## 2019-02-04 RX ORDER — SODIUM CHLORIDE 0.9 % (FLUSH) 0.9 %
5 SYRINGE (ML) INJECTION
Status: DISCONTINUED | OUTPATIENT
Start: 2019-02-04 | End: 2019-02-07 | Stop reason: HOSPADM

## 2019-02-04 RX ORDER — FLUTICASONE FUROATE AND VILANTEROL 200; 25 UG/1; UG/1
1 POWDER RESPIRATORY (INHALATION) DAILY
Status: DISCONTINUED | OUTPATIENT
Start: 2019-02-05 | End: 2019-02-07 | Stop reason: HOSPADM

## 2019-02-04 RX ORDER — IPRATROPIUM BROMIDE AND ALBUTEROL SULFATE 2.5; .5 MG/3ML; MG/3ML
3 SOLUTION RESPIRATORY (INHALATION) EVERY 4 HOURS
Status: DISCONTINUED | OUTPATIENT
Start: 2019-02-04 | End: 2019-02-05

## 2019-02-04 RX ORDER — PREDNISONE 20 MG/1
40 TABLET ORAL DAILY
Status: DISCONTINUED | OUTPATIENT
Start: 2019-02-05 | End: 2019-02-07 | Stop reason: HOSPADM

## 2019-02-04 RX ORDER — METHYLPREDNISOLONE SODIUM SUCCINATE 125 MG/2ML
125 INJECTION INTRAMUSCULAR; INTRAVENOUS ONCE
Status: DISCONTINUED | OUTPATIENT
Start: 2019-02-04 | End: 2019-02-04 | Stop reason: CLARIF

## 2019-02-04 RX ORDER — AZITHROMYCIN 250 MG/1
250 TABLET, FILM COATED ORAL DAILY
Status: DISCONTINUED | OUTPATIENT
Start: 2019-02-05 | End: 2019-02-04

## 2019-02-04 RX ORDER — TIOTROPIUM BROMIDE 18 UG/1
1 CAPSULE ORAL; RESPIRATORY (INHALATION) DAILY
Status: DISCONTINUED | OUTPATIENT
Start: 2019-02-05 | End: 2019-02-07 | Stop reason: HOSPADM

## 2019-02-04 RX ORDER — GLUCAGON 1 MG
1 KIT INJECTION
Status: DISCONTINUED | OUTPATIENT
Start: 2019-02-04 | End: 2019-02-07 | Stop reason: HOSPADM

## 2019-02-04 RX ORDER — ALBUTEROL SULFATE 0.83 MG/ML
2.5 SOLUTION RESPIRATORY (INHALATION)
Status: DISCONTINUED | OUTPATIENT
Start: 2019-02-04 | End: 2019-02-04 | Stop reason: HOSPADM

## 2019-02-04 RX ORDER — IBUPROFEN 200 MG
16 TABLET ORAL
Status: DISCONTINUED | OUTPATIENT
Start: 2019-02-04 | End: 2019-02-07 | Stop reason: HOSPADM

## 2019-02-04 RX ORDER — ACETAMINOPHEN 325 MG/1
650 TABLET ORAL EVERY 4 HOURS PRN
Status: DISCONTINUED | OUTPATIENT
Start: 2019-02-04 | End: 2019-02-07 | Stop reason: HOSPADM

## 2019-02-04 RX ORDER — IBUPROFEN 200 MG
24 TABLET ORAL
Status: DISCONTINUED | OUTPATIENT
Start: 2019-02-04 | End: 2019-02-07 | Stop reason: HOSPADM

## 2019-02-04 RX ORDER — AZITHROMYCIN 250 MG/1
500 TABLET, FILM COATED ORAL ONCE
Status: DISCONTINUED | OUTPATIENT
Start: 2019-02-04 | End: 2019-02-04

## 2019-02-04 RX ORDER — HEPARIN SODIUM 5000 [USP'U]/ML
5000 INJECTION, SOLUTION INTRAVENOUS; SUBCUTANEOUS EVERY 8 HOURS
Status: DISCONTINUED | OUTPATIENT
Start: 2019-02-04 | End: 2019-02-07 | Stop reason: HOSPADM

## 2019-02-04 RX ADMIN — IPRATROPIUM BROMIDE AND ALBUTEROL SULFATE 3 ML: .5; 3 SOLUTION RESPIRATORY (INHALATION) at 07:02

## 2019-02-04 RX ADMIN — ALBUTEROL SULFATE 2.5 MG: 0.83 SOLUTION RESPIRATORY (INHALATION) at 01:02

## 2019-02-04 NOTE — PROGRESS NOTES
Subjective:       Patient ID: Cassia Kelly is a 59 y.o. female.        Chief Complaint: Shortness of Breath and Cough    Cassia Kelly is an established patient of Marco A Rondon MD here today for urgent care visit.    Cough/shortness of breath:  Sx originally started around 1/7/19 with cough productive of green mucus and shortness of breath.    Saw Julián Sy NP 1/14/19 and given rx for doxy.  Sx improved starting around 1/17/19 and she was feeling better until 1/30 or 1/31.  Now coughing again (dry at times, clear mucus at times) and shortness of breath.  She is using albuterol 2-3 times daily, last used 3 hours ago.  Shortness of breath much worse than baseline and worse than prior COPD flares past 2-3 days.  Can't even take a shower without shortness of breath.  Using her 's oxygen at home.  Pulse ox dropping to 85% at home and in clinic with minimal exertion.    COPD:  Changed to Trelegy by pulmonary 12/2018.  Had CT scan lungs, 6 MWT, and PFT's.           Review of Systems   Constitutional: Negative for chills, diaphoresis, fatigue and fever.   HENT: Negative for congestion and sore throat.    Eyes: Negative for visual disturbance.   Respiratory: Positive for cough and shortness of breath. Negative for chest tightness.    Cardiovascular: Negative for chest pain, palpitations and leg swelling.   Gastrointestinal: Negative for abdominal pain, blood in stool, constipation, diarrhea, nausea and vomiting.   Genitourinary: Negative for dysuria, frequency, hematuria and urgency.   Musculoskeletal: Negative for arthralgias and back pain.   Skin: Negative for rash.   Neurological: Negative for dizziness, syncope, weakness and headaches.   Psychiatric/Behavioral: Negative for dysphoric mood and sleep disturbance. The patient is not nervous/anxious.        Objective:      Physical Exam   Constitutional: She appears well-developed and well-nourished.   HENT:   Head: Normocephalic.   Right Ear:  External ear normal.   Left Ear: External ear normal.   Mouth/Throat: Oropharynx is clear and moist.   Eyes: Pupils are equal, round, and reactive to light.   Cardiovascular: Normal rate, regular rhythm and normal heart sounds. Exam reveals no gallop and no friction rub.   No murmur heard.  Pulmonary/Chest: Effort normal and breath sounds normal. No respiratory distress.   Coughs occasionally during exam  No wheezing on initial exam but with decreased air movement, improved after albuterol neb  Shortness of breath remained the same before and after neb, oxygen level dropping to 85% with minimal exertion, at rest 90-91%   Abdominal: Soft. Normal appearance. There is no tenderness.   Musculoskeletal: She exhibits no edema.   Neurological: She is alert.   Skin: Skin is warm and dry.   Psychiatric: She has a normal mood and affect.   Nursing note and vitals reviewed.      Assessment:       1. COPD exacerbation    2. Shortness of breath    3. Tobacco abuse disorder        Plan:       Cassia Antonio was seen today for shortness of breath and cough.    Diagnoses and all orders for this visit:    COPD exacerbation  -     albuterol nebulizer solution 2.5 mg  -     Inhalation tx; Future  -     methylPREDNISolone sod suc(PF) injection 125 mg  -     Refer to Emergency Dept.    Shortness of breath  -     Refer to Emergency Dept.    Tobacco abuse disorder    Shortness of breath x 3 days, worse than baseline and worse than prior COPD flares.  Shortness of breath not improved with in office albuterol neb.  Also given 125 mg solumedrol.  Reassessed and with better air movement but no improvement on pulse ox or of shortness of breath.  90-91% at rest, drops to 85-86% with minimal exertion.  To ED for further evaluation.  Declines nurse escort and prefers her  to take her to the ED.      Pt has been given instructions populated from [facility] and has verbalized understanding of the after visit summary and information  "contained wherein.    Follow up with a primary care provider. May go to ER for acute shortness of breath, lightheadedness, fever, or any other emergent complaints or changes in condition.    "This note will be shared with the patient"    No future appointments.            "

## 2019-02-04 NOTE — PROGRESS NOTES
Pt two identifiers used, allergies reviewed. Asked to wait in clinic 15 minutes post injection for adverse side effects. Pt tolerated well.

## 2019-02-04 NOTE — LETTER
February 7, 2019    Cassia Kelly  [address] [place] [address]                [address]  [facility] [address]  Phone: [phone]  Fax: [phone] To Whom It May Concern,     Cassia Kelly was admitted to the hospital on 2/4/2019 and discharged on 02/07/19. She may return to work on 02/15/2019.  If you have any questions or concerns, please don't hesitate to call our office at [phone].      If you have any questions or concerns, please don't hesitate to call.    Sincerely,        Dariana Galvan MD

## 2019-02-04 NOTE — LETTER
February 6, 2019    To whom it may concern,             [facility]  [address] [place]  [address]  Phone: [phone]  Fax: [phone] February 6, 2019     Patient: Cassia Kelly   YOB: 1960       To Whom It May Concern:    Cassia Kelly was admitted to the hospital on 2/4/2019  3:34 PM and discharged on 02/07/19 .  She may return to work on 02/11/209.  If you have any questions or concerns, please don't hesitate to call our office at [phone].      Sincerely,        Marga Yusuf DO  Department of Hospital Medicine

## 2019-02-04 NOTE — ED PROVIDER NOTES
Encounter Date: 2019       History     Chief Complaint   Patient presents with    Shortness of Breath     hx copd, sent from im clinic, had resp tx and steroids     This is a 59-year-old female with a past medical history of COPD who presents to ED as a referral from clinic with hypoxia.  Patient reports a 3 day history of worsening shortness of breath.  She states that her medication was changed from Advair to trelegy, is not sure if associated but has had intermittent COPD symptoms over the last several weeks. Completed a course of Doxycycline several weeks ago with interval improvement. Over the last 3 days she notes increased cough with white sputum production. She has been using her 's pulse oximeter at home with oxygen saturations in the mid 80s. She has used his supplemental oxygen at times with moderate improvement.  She denies fever, chills, nasal congestion, chest pain, nausea/vomiting, abdominal pain, changes in urination, peripheral edema or additional complaints. She was seen in the clinic and treated with nebs and solumedrol with symptomatic improvement, she continued to remain hypoxic in the 80s despite treatment, was referred to the ED.          Review of patient's allergies indicates:   Allergen Reactions    Penicillins Rash     Past Medical History:   Diagnosis Date    Abnormal Pap smear     Hyst    Acute cystitis 2018    Adenomatous polyp of colon 2015    Repeat c-scope 5 yrs     Cervical cancer     COPD (chronic obstructive pulmonary disease)     Microscopic hematuria 2013    Smoker 2015    STD (sexually transmitted disease)     Ureteral calculus, left 2013     Past Surgical History:   Procedure Laterality Date    APPENDECTOMY       SECTION      x1    COLONOSCOPY N/A 2015    Performed by Milan Chatter[facility] at [facility] (4TH FLR)    HYSTERECTOMY      LAVERN/LSO (Abn Pap)    OOPHORECTOMY      Vocal chord nodules removed        Family History   Problem Relation Age of Onset    Hypertension Father     Hypertension Mother     Heart disease Sister         arrhy    Heart disease Brother         MI    Cancer Paternal Aunt         colon ?    Melanoma Neg Hx     Psoriasis Neg Hx     Lupus Neg Hx      Social History     Tobacco Use    Smoking status: Current Some Day Smoker     Packs/day: 1.00     Types: Cigarettes    Smokeless tobacco: Never Used   Substance Use Topics    Alcohol use: No    Drug use: Not on file     Review of Systems   Constitutional: Positive for fatigue. Negative for chills and fever.   HENT: Negative for sore throat.    Respiratory: Positive for cough and shortness of breath.    Cardiovascular: Negative for chest pain, palpitations and leg swelling.   Gastrointestinal: Negative for nausea and vomiting.   Genitourinary: Negative for dysuria.   Musculoskeletal: Negative for back pain.   Skin: Negative for rash.   Neurological: Negative for weakness.   Hematological: Does not bruise/bleed easily.       Physical Exam     Initial Vitals [02/04/19 1434]   BP Pulse Resp Temp SpO2   (!) 150/81 84 (!) 22 97.9 °F (36.6 °C) (!) 91 %      MAP       --         Physical Exam    Constitutional: She appears well-developed and well-nourished. No distress.   HENT:   Head: Atraumatic.   Eyes: Conjunctivae and EOM are normal. Pupils are equal, round, and reactive to light.   Cardiovascular: Normal rate, regular rhythm and normal heart sounds.   Pulmonary/Chest: No respiratory distress. She has decreased breath sounds. She has no wheezes. She has no rhonchi. She has no rales.   Abdominal: Soft. Bowel sounds are normal. There is no tenderness. There is no rebound and no guarding.   Neurological: She is alert and oriented to person, place, and time.   Skin: Skin is warm and dry. No rash noted.         ED Course   Procedures  Labs Reviewed   CBC W/ AUTO DIFFERENTIAL - Abnormal; Notable for the following components:       Result Value     Gran # (ANC) 8.8 (*)     Immature Grans (Abs) 0.05 (*)     Mono # 0.2 (*)     Gran% 83.9 (*)     Lymph% 12.3 (*)     Mono% 1.9 (*)     All other components within normal limits   BASIC METABOLIC PANEL - Abnormal; Notable for the following components:    Glucose 114 (*)     All other components within normal limits   HEMOGLOBIN A1C          Imaging Results          X-Ray Chest PA And Lateral (Final result)  Result time 02/04/19 16:53:47    Final result by Carson Chaudhry MD (02/04/19 16:53:47)                 Impression:      COPD.  No acute abnormality noted.  Mild compression deformity of 1 of the upper thoracic vertebral bodies.  No detrimental interval change.      Electronically signed by: Carson Chaudhry MD  Date:    02/04/2019  Time:    16:53             Narrative:    EXAMINATION:  XR CHEST PA AND LATERAL    CLINICAL HISTORY:  Chronic obstructive pulmonary disease, unspecified    TECHNIQUE:  PA and lateral views of the chest were performed.    COMPARISON:  Chest radiograph dated 11/22/2018 and CT scan of the chest dated 12/04/2018    FINDINGS:  Heart size and pulmonary vascularity are within normal limits.  Lungs appear hyperinflated with increase in the retrosternal airspace and flattening of the diaphragm, compatible with COPD.  Mild accentuation of the interstitial markings bilaterally.  No superimposed acute consolidation or pleural effusion.  The nodular opacity seen in the left lower lung zone on the prior chest radiograph is not identified on today's study.  The micronodule seen in the right upper lobe on the patient's CT scan cannot be identified with certainty on this routine chest radiograph.  Some apical scarring bilaterally.  Generalized osteopenia with mild loss of height of 1 of the upper thoracic vertebral bodies.                                       APC / Resident Notes:   59-year-old female presenting from clinic with COPD exacerbation symptoms and hypoxia.  On exam she is afebrile and nontoxic.   Respirations are even and nonlabored, O2 92% on 2 L via nasal cannula.  She has decreased breath sounds throughout.  No wheezing or rhonchi.    Labs are stable without acute findings.  Chest x-ray demonstrates findings consistent with COPD, no focal consolidation appreciated.  Given patient's new hypoxia, will admit for COPD pathway treatment. I discussed the care of this patient with my supervising MD.                  Clinical Impression:   The primary encounter diagnosis was COPD (chronic obstructive pulmonary disease). A diagnosis of Shortness of breath was also pertinent to this visit.      Disposition:   Disposition: Admitted  Condition: Stable                        Madina Espitia PA-C  02/04/19 4930

## 2019-02-05 ENCOUNTER — CLINICAL SUPPORT (OUTPATIENT)
Dept: SMOKING CESSATION | Facility: CLINIC | Age: 59
DRG: 189 | End: 2019-02-05
Payer: COMMERCIAL

## 2019-02-05 DIAGNOSIS — F17.200 NICOTINE DEPENDENCE: Primary | ICD-10-CM

## 2019-02-05 LAB
ALBUMIN SERPL BCP-MCNC: 3.8 G/DL
ALP SERPL-CCNC: 121 U/L
ALT SERPL W/O P-5'-P-CCNC: 18 U/L
ANION GAP SERPL CALC-SCNC: 11 MMOL/L
AST SERPL-CCNC: 11 U/L
BASOPHILS # BLD AUTO: 0.02 K/UL
BASOPHILS NFR BLD: 0.2 %
BILIRUB SERPL-MCNC: 0.4 MG/DL
BUN SERPL-MCNC: 13 MG/DL
CALCIUM SERPL-MCNC: 10.1 MG/DL
CHLORIDE SERPL-SCNC: 104 MMOL/L
CO2 SERPL-SCNC: 23 MMOL/L
CREAT SERPL-MCNC: 0.6 MG/DL
DIFFERENTIAL METHOD: ABNORMAL
EOSINOPHIL # BLD AUTO: 0 K/UL
EOSINOPHIL NFR BLD: 0 %
ERYTHROCYTE [DISTWIDTH] IN BLOOD BY AUTOMATED COUNT: 12.9 %
EST. GFR  (AFRICAN AMERICAN): >60 ML/MIN/1.73 M^2
EST. GFR  (NON AFRICAN AMERICAN): >60 ML/MIN/1.73 M^2
GLUCOSE SERPL-MCNC: 158 MG/DL
HCT VFR BLD AUTO: 44.1 %
HGB BLD-MCNC: 14.4 G/DL
IMM GRANULOCYTES # BLD AUTO: 0.04 K/UL
IMM GRANULOCYTES NFR BLD AUTO: 0.5 %
LYMPHOCYTES # BLD AUTO: 1.2 K/UL
LYMPHOCYTES NFR BLD: 13.7 %
MAGNESIUM SERPL-MCNC: 2 MG/DL
MCH RBC QN AUTO: 31.2 PG
MCHC RBC AUTO-ENTMCNC: 32.7 G/DL
MCV RBC AUTO: 96 FL
MONOCYTES # BLD AUTO: 0.1 K/UL
MONOCYTES NFR BLD: 1.5 %
NEUTROPHILS # BLD AUTO: 7.4 K/UL
NEUTROPHILS NFR BLD: 84.1 %
NRBC BLD-RTO: 0 /100 WBC
PHOSPHATE SERPL-MCNC: 2.8 MG/DL
PLATELET # BLD AUTO: 252 K/UL
PMV BLD AUTO: 11.7 FL
POTASSIUM SERPL-SCNC: 4.2 MMOL/L
PROT SERPL-MCNC: 7.3 G/DL
RBC # BLD AUTO: 4.61 M/UL
SODIUM SERPL-SCNC: 138 MMOL/L
WBC # BLD AUTO: 8.74 K/UL

## 2019-02-05 PROCEDURE — 99232 PR SUBSEQUENT HOSPITAL CARE,LEVL II: ICD-10-PCS | Mod: ,,, | Performed by: INTERNAL MEDICINE

## 2019-02-05 PROCEDURE — 80053 COMPREHEN METABOLIC PANEL: CPT

## 2019-02-05 PROCEDURE — 94640 AIRWAY INHALATION TREATMENT: CPT

## 2019-02-05 PROCEDURE — 85025 COMPLETE CBC W/AUTO DIFF WBC: CPT

## 2019-02-05 PROCEDURE — 99406 PR TOBACCO USE CESSATION INTERMEDIATE 3-10 MINUTES: ICD-10-PCS | Mod: S$GLB,,, | Performed by: INTERNAL MEDICINE

## 2019-02-05 PROCEDURE — 99406 BEHAV CHNG SMOKING 3-10 MIN: CPT | Mod: S$GLB,,, | Performed by: INTERNAL MEDICINE

## 2019-02-05 PROCEDURE — 11000001 HC ACUTE MED/SURG PRIVATE ROOM

## 2019-02-05 PROCEDURE — 63600175 PHARM REV CODE 636 W HCPCS: Performed by: INTERNAL MEDICINE

## 2019-02-05 PROCEDURE — 99232 SBSQ HOSP IP/OBS MODERATE 35: CPT | Mod: ,,, | Performed by: INTERNAL MEDICINE

## 2019-02-05 PROCEDURE — 36415 COLL VENOUS BLD VENIPUNCTURE: CPT

## 2019-02-05 PROCEDURE — 25000242 PHARM REV CODE 250 ALT 637 W/ HCPCS: Performed by: STUDENT IN AN ORGANIZED HEALTH CARE EDUCATION/TRAINING PROGRAM

## 2019-02-05 PROCEDURE — 83735 ASSAY OF MAGNESIUM: CPT

## 2019-02-05 PROCEDURE — 27000221 HC OXYGEN, UP TO 24 HOURS

## 2019-02-05 PROCEDURE — 25000003 PHARM REV CODE 250: Performed by: STUDENT IN AN ORGANIZED HEALTH CARE EDUCATION/TRAINING PROGRAM

## 2019-02-05 PROCEDURE — 94761 N-INVAS EAR/PLS OXIMETRY MLT: CPT

## 2019-02-05 PROCEDURE — 63600175 PHARM REV CODE 636 W HCPCS: Performed by: STUDENT IN AN ORGANIZED HEALTH CARE EDUCATION/TRAINING PROGRAM

## 2019-02-05 PROCEDURE — 84100 ASSAY OF PHOSPHORUS: CPT

## 2019-02-05 RX ORDER — BENZONATATE 100 MG/1
100 CAPSULE ORAL 3 TIMES DAILY PRN
Status: DISCONTINUED | OUTPATIENT
Start: 2019-02-05 | End: 2019-02-07 | Stop reason: HOSPADM

## 2019-02-05 RX ORDER — IBUPROFEN 200 MG
400 TABLET ORAL DAILY PRN
Status: ON HOLD | COMMUNITY
End: 2019-02-07 | Stop reason: HOSPADM

## 2019-02-05 RX ORDER — IPRATROPIUM BROMIDE AND ALBUTEROL SULFATE 2.5; .5 MG/3ML; MG/3ML
3 SOLUTION RESPIRATORY (INHALATION)
Status: DISCONTINUED | OUTPATIENT
Start: 2019-02-05 | End: 2019-02-07 | Stop reason: HOSPADM

## 2019-02-05 RX ADMIN — HEPARIN SODIUM 5000 UNITS: 5000 INJECTION, SOLUTION INTRAVENOUS; SUBCUTANEOUS at 10:02

## 2019-02-05 RX ADMIN — FLUTICASONE FUROATE AND VILANTEROL TRIFENATATE 1 PUFF: 200; 25 POWDER RESPIRATORY (INHALATION) at 04:02

## 2019-02-05 RX ADMIN — IPRATROPIUM BROMIDE AND ALBUTEROL SULFATE 3 ML: .5; 3 SOLUTION RESPIRATORY (INHALATION) at 11:02

## 2019-02-05 RX ADMIN — TIOTROPIUM BROMIDE 18 MCG: 18 CAPSULE ORAL; RESPIRATORY (INHALATION) at 04:02

## 2019-02-05 RX ADMIN — BENZONATATE 100 MG: 100 CAPSULE ORAL at 11:02

## 2019-02-05 RX ADMIN — BENZONATATE 100 MG: 100 CAPSULE ORAL at 09:02

## 2019-02-05 RX ADMIN — IPRATROPIUM BROMIDE AND ALBUTEROL SULFATE 3 ML: .5; 3 SOLUTION RESPIRATORY (INHALATION) at 04:02

## 2019-02-05 RX ADMIN — IPRATROPIUM BROMIDE AND ALBUTEROL SULFATE 3 ML: .5; 3 SOLUTION RESPIRATORY (INHALATION) at 07:02

## 2019-02-05 RX ADMIN — IPRATROPIUM BROMIDE AND ALBUTEROL SULFATE 3 ML: .5; 3 SOLUTION RESPIRATORY (INHALATION) at 12:02

## 2019-02-05 RX ADMIN — IPRATROPIUM BROMIDE AND ALBUTEROL SULFATE 3 ML: .5; 3 SOLUTION RESPIRATORY (INHALATION) at 08:02

## 2019-02-05 RX ADMIN — HEPARIN SODIUM 5000 UNITS: 5000 INJECTION, SOLUTION INTRAVENOUS; SUBCUTANEOUS at 02:02

## 2019-02-05 RX ADMIN — IPRATROPIUM BROMIDE AND ALBUTEROL SULFATE 3 ML: .5; 3 SOLUTION RESPIRATORY (INHALATION) at 03:02

## 2019-02-05 RX ADMIN — PREDNISONE 40 MG: 20 TABLET ORAL at 09:02

## 2019-02-05 NOTE — HPI
58 yo with COPD and a 40 pack year history of smoking who presents to ED from clinic with hypoxia. Patient reports a 4 day history of worsening shortness of breath. She states that her medication was changed from Advair to Trelegy, is not sure if associated but has had intermittent COPD symptoms over the last several weeks. Completed a course of Doxycycline several weeks ago with interval improvement. Since 2/1, pt has increase in her cough with white sputum. She has been using her 's pulse oximeter at home with oxygen saturations in the mid 80s and his supplemental oxygen at times with moderate improvement. She denies fever, chills, hemoptysis, nasal congestion, chest pain, nausea/vomiting, abdominal pain, changes in urination, peripheral edema or additional complaints.     She was seen in clinic and treated with nebs and solumedrol with symptomatic improvement and self-ambulated to the ED.

## 2019-02-05 NOTE — SUBJECTIVE & OBJECTIVE
Past Medical History:   Diagnosis Date    Abnormal Pap smear     Hyst    Acute cystitis 2018    Adenomatous polyp of colon 2015    Repeat c-scope 5 yrs     Cervical cancer     COPD (chronic obstructive pulmonary disease)     Microscopic hematuria 2013    Smoker 2015    STD (sexually transmitted disease)     Ureteral calculus, left 2013       Past Surgical History:   Procedure Laterality Date    APPENDECTOMY       SECTION      x1    COLONOSCOPY N/A 2015    Performed by Milan Chatterjee MD at [facility] ENDO (4TH FLR)    HYSTERECTOMY      LAVERN/LSO (Abn Pap)    OOPHORECTOMY      Vocal chord nodules removed         Review of patient's allergies indicates:   Allergen Reactions    Penicillins Rash       Current Facility-Administered Medications on File Prior to Encounter   Medication    [COMPLETED] albuterol nebulizer solution 2.5 mg    [COMPLETED] methylPREDNISolone sod suc(PF) injection 125 mg    [DISCONTINUED] methylPREDNISolone sod suc(PF) injection 125 mg    [DISCONTINUED] methylPREDNISolone sodium succinate injection 125 mg     Current Outpatient Medications on File Prior to Encounter   Medication Sig    albuterol (PROVENTIL) 2.5 mg /3 mL (0.083 %) nebulizer solution Take 3 mLs (2.5 mg total) by nebulization every 6 (six) hours as needed for Wheezing.    albuterol (PROVENTIL/VENTOLIN HFA) 90 mcg/actuation inhaler Inhale 2 puffs into the lungs every 4 (four) hours as needed for Wheezing or Shortness of Breath.    fluticasone-umeclidin-vilanter (TRELEGY ELLIPTA) 100-62.5-25 mcg DsDv Inhale 1 puff into the lungs once daily.     Family History     Problem Relation (Age of Onset)    Cancer Paternal Aunt    Heart disease Sister, Brother    Hypertension Father, Mother        Tobacco Use    Smoking status: Current Some Day Smoker     Packs/day: 1.00     Types: Cigarettes    Smokeless tobacco: Never Used   Substance and Sexual Activity    Alcohol use: No     Drug use: Not on file    Sexual activity: Yes     Partners: Male     Birth control/protection: Post-menopausal     Review of Systems   Constitutional: Negative for chills, diaphoresis and fever.   Respiratory: Positive for cough and shortness of breath. Negative for wheezing.    Cardiovascular: Negative for chest pain, palpitations and leg swelling.   Gastrointestinal: Negative for abdominal distention, diarrhea, nausea and vomiting.   Genitourinary: Negative for difficulty urinating and dysuria.   Musculoskeletal: Negative.    Neurological: Negative.    Hematological: Negative.    Psychiatric/Behavioral: Negative.      Objective:     Vital Signs (Most Recent):  Temp: 97.9 °F (36.6 °C) (02/04/19 1434)  Pulse: 84 (02/04/19 1434)  Resp: (!) 22 (02/04/19 1434)  BP: (!) 150/81 (02/04/19 1434)  SpO2: (!) 91 % (02/04/19 1434) Vital Signs (24h Range):  Temp:  [97.9 °F (36.6 °C)] 97.9 °F (36.6 °C)  Pulse:  [80-85] 84  Resp:  [22] 22  SpO2:  [91 %-92 %] 91 %  BP: (130-150)/(70-81) 150/81     Weight: 69.9 kg (154 lb)  Body mass index is 27.28 kg/m².    Physical Exam   Constitutional: She is oriented to person, place, and time. She appears well-developed and well-nourished. No distress.   HENT:   Head: Normocephalic and atraumatic.   Eyes: Pupils are equal, round, and reactive to light.   Cardiovascular: Normal rate, regular rhythm and normal heart sounds.   Pulmonary/Chest: Effort normal. No respiratory distress. She has no wheezes.   Decreased breath sounds globally   Abdominal: Soft. There is no tenderness.   Neurological: She is alert and oriented to person, place, and time.   Skin: Capillary refill takes less than 2 seconds. She is not diaphoretic.   Psychiatric: She has a normal mood and affect. Her behavior is normal. Judgment and thought content normal.   Nursing note and vitals reviewed.        CRANIAL NERVES     CN III, IV, VI   Pupils are equal, round, and reactive to light.       Significant Labs:   CBC:   Recent  Labs   Lab 02/04/19  1604   WBC 10.49   HGB 14.8   HCT 46.3        CMP:   Recent Labs   Lab 02/04/19  1604      K 3.9      CO2 25   *   BUN 12   CREATININE 0.7   CALCIUM 10.0   ANIONGAP 11   EGFRNONAA >60.0       Significant Imaging: I have reviewed all pertinent imaging results/findings within the past 24 hours.

## 2019-02-05 NOTE — SUBJECTIVE & OBJECTIVE
Interval History: See hospital course    Review of Systems  Objective:     Vital Signs (Most Recent):  Temp: 98.1 °F (36.7 °C) (02/05/19 0800)  Pulse: 90 (02/05/19 0801)  Resp: 20 (02/05/19 0801)  BP: 121/79 (02/05/19 0800)  SpO2: (!) 89 % (02/05/19 0801) Vital Signs (24h Range):  Temp:  [97.9 °F (36.6 °C)-98.3 °F (36.8 °C)] 98.1 °F (36.7 °C)  Pulse:  [69-92] 90  Resp:  [16-23] 20  SpO2:  [87 %-98 %] 89 %  BP: (121-150)/(68-86) 121/79     Weight: 69.9 kg (154 lb)  Body mass index is 27.28 kg/m².    Intake/Output Summary (Last 24 hours) at 2/5/2019 0840  Last data filed at 2/5/2019 0600  Gross per 24 hour   Intake 480 ml   Output --   Net 480 ml      Physical Exam   Constitutional: She is oriented to person, place, and time. She appears well-developed and well-nourished. No distress.   HENT:   Head: Normocephalic and atraumatic.   Eyes: Pupils are equal, round, and reactive to light.   Cardiovascular: Normal rate, regular rhythm and normal heart sounds.   Pulmonary/Chest: No respiratory distress. She has no wheezes.   Decreased breath sounds globally.    Abdominal: Soft. There is no tenderness.   Neurological: She is alert and oriented to person, place, and time.   Skin: Capillary refill takes 2 to 3 seconds. She is not diaphoretic.       Significant Labs:   CBC:   Recent Labs   Lab 02/04/19  1604 02/05/19  0322   WBC 10.49 8.74   HGB 14.8 14.4   HCT 46.3 44.1    252     CMP:   Recent Labs   Lab 02/04/19  1604 02/05/19  0322    138   K 3.9 4.2    104   CO2 25 23   * 158*   BUN 12 13   CREATININE 0.7 0.6   CALCIUM 10.0 10.1   PROT  --  7.3   ALBUMIN  --  3.8   BILITOT  --  0.4   ALKPHOS  --  121   AST  --  11   ALT  --  18   ANIONGAP 11 11   EGFRNONAA >60.0 >60.0       Significant Imaging: I have reviewed all pertinent imaging results/findings within the past 24 hours.

## 2019-02-05 NOTE — HOSPITAL COURSE
02/05/2019 NAEON. Pt on oxygen, desaturates to 88% off oxygen. Pt progressing, decreased cough.   02/06/2019: NAEON. Patient saturating at 89-91% without oxygen. SPO2 decreased to 87% while walking. Plan to d/c with home oxygen for use with activity.  02/07/2019 NAEON. Pt saturating >90% at rest, but desaturates with activity to 84%. Will discharge with azithromycin.

## 2019-02-05 NOTE — H&P
[facility] Hospital Medicine  History & Physical    Patient Name: Cassia Kelly  MRN: [id number]  Admission Date: 2/4/2019  Attending Physician: April Stone MD   Primary Care Provider: Marco A Rondon MD    [facility] Medicine Team: Networked reference to record PCT  Dariana Galvan MD     Patient information was obtained from patient, spouse/SO, past medical records and ER records.     Subjective:     Principal Problem:Chronic obstructive pulmonary disease with acute exacerbation    Chief Complaint:   Chief Complaint   Patient presents with    Shortness of Breath     hx copd, sent from im clinic, had resp tx and steroids        HPI: 58 yo with COPD and a 40 pack year history of smoking who presents to ED from clinic with hypoxia. Patient reports a 4 day history of worsening shortness of breath. She states that her medication was changed from Advair to Trelegy, is not sure if associated but has had intermittent COPD symptoms over the last several weeks. Completed a course of Doxycycline several weeks ago with interval improvement. Since 2/1, pt has increase in her cough with white sputum. She has been using her 's pulse oximeter at home with oxygen saturations in the mid 80s and his supplemental oxygen at times with moderate improvement. She denies fever, chills, hemoptysis, nasal congestion, chest pain, nausea/vomiting, abdominal pain, changes in urination, peripheral edema or additional complaints.     She was seen in clinic and treated with nebs and solumedrol with symptomatic improvement and self-ambulated to the ED.         Past Medical History:   Diagnosis Date    Abnormal Pap smear 1987    Hyst    Acute cystitis 1/24/2018    Adenomatous polyp of colon 11/23/2015    Repeat c-scope 5 yrs     Cervical cancer     COPD (chronic obstructive pulmonary disease)     Microscopic hematuria 8/13/2013    Smoker 11/23/2015    STD (sexually transmitted disease)     Ureteral  calculus, left 2013       Past Surgical History:   Procedure Laterality Date    APPENDECTOMY       SECTION      x1    COLONOSCOPY N/A 2015    Performed by Milan [facility] at [facility] END[facility] (4TH FLR)    HYSTERECTOMY  1987    LAVERN/LSO (Abn Pap)    OOPHORECTOMY      Vocal chord nodules removed         Review of patient's allergies indicates:   Allergen Reactions    Penicillins Rash       Current Facility-Administered Medications on File Prior to Encounter   Medication    [COMPLETED] albuterol nebulizer solution 2.5 mg    [COMPLETED] methylPREDNISolone sod suc(PF) injection 125 mg    [DISCONTINUED] methylPREDNISolone sod suc(PF) injection 125 mg    [DISCONTINUED] methylPREDNISolone sodium succinate injection 125 mg     Current Outpatient Medications on File Prior to Encounter   Medication Sig    albuterol (PROVENTIL) 2.5 mg /3 mL (0.083 %) nebulizer solution Take 3 mLs (2.5 mg total) by nebulization every 6 (six) hours as needed for Wheezing.    albuterol (PROVENTIL/VENTOLIN HFA) 90 mcg/actuation inhaler Inhale 2 puffs into the lungs every 4 (four) hours as needed for Wheezing or Shortness of Breath.    fluticasone-umeclidin-vilanter (TRELEGY ELLIPTA) 100-62.5-25 mcg DsDv Inhale 1 puff into the lungs once daily.     Family History     Problem Relation (Age of Onset)    Cancer Paternal Aunt    Heart disease Sister, Brother    Hypertension Father, Mother        Tobacco Use    Smoking status: Current Some Day Smoker     Packs/day: 1.00     Types: Cigarettes    Smokeless tobacco: Never Used   Substance and Sexual Activity    Alcohol use: No    Drug use: Not on file    Sexual activity: Yes     Partners: Male     Birth control/protection: Post-menopausal     Review of Systems   Constitutional: Negative for chills, diaphoresis and fever.   Respiratory: Positive for cough and shortness of breath. Negative for wheezing.    Cardiovascular: Negative for chest pain, palpitations and leg swelling.    Gastrointestinal: Negative for abdominal distention, diarrhea, nausea and vomiting.   Genitourinary: Negative for difficulty urinating and dysuria.   Musculoskeletal: Negative.    Neurological: Negative.    Hematological: Negative.    Psychiatric/Behavioral: Negative.      Objective:     Vital Signs (Most Recent):  Temp: 97.9 °F (36.6 °C) (02/04/19 1434)  Pulse: 84 (02/04/19 1434)  Resp: (!) 22 (02/04/19 1434)  BP: (!) 150/81 (02/04/19 1434)  SpO2: (!) 91 % (02/04/19 1434) Vital Signs (24h Range):  Temp:  [97.9 °F (36.6 °C)] 97.9 °F (36.6 °C)  Pulse:  [80-85] 84  Resp:  [22] 22  SpO2:  [91 %-92 %] 91 %  BP: (130-150)/(70-81) 150/81     Weight: 69.9 kg (154 lb)  Body mass index is 27.28 kg/m².    Physical Exam   Constitutional: She is oriented to person, place, and time. She appears well-developed and well-nourished. No distress.   HENT:   Head: Normocephalic and atraumatic.   Eyes: Pupils are equal, round, and reactive to light.   Cardiovascular: Normal rate, regular rhythm and normal heart sounds.   Pulmonary/Chest: Effort normal. No respiratory distress. She has no wheezes.   Decreased breath sounds globally   Abdominal: Soft. There is no tenderness.   Neurological: She is alert and oriented to person, place, and time.   Skin: Capillary refill takes less than 2 seconds. She is not diaphoretic.   Psychiatric: She has a normal mood and affect. Her behavior is normal. Judgment and thought content normal.   Nursing note and vitals reviewed.        CRANIAL NERVES     CN III, IV, VI   Pupils are equal, round, and reactive to light.       Significant Labs:   CBC:   Recent Labs   Lab 02/04/19  1604   WBC 10.49   HGB 14.8   HCT 46.3        CMP:   Recent Labs   Lab 02/04/19  1604      K 3.9      CO2 25   *   BUN 12   CREATININE 0.7   CALCIUM 10.0   ANIONGAP 11   EGFRNONAA >60.0       Significant Imaging: I have reviewed all pertinent imaging results/findings within the past 24  hours.    Assessment/Plan:     * Chronic obstructive pulmonary disease with acute exacerbation    On Trelegy at home, non-formulary, will order Breo  Received duonebs and solumedrol 125mg in clinic before ED presentation  Duo-nebs q4h  Prednisone 40mg daily  Does not appear infectious, will hold abx       Tobacco abuse disorder    40 pack year history of smoking.   Denies need for nicotine patch at this time.          VTE Risk Mitigation (From admission, onward)        Ordered     heparin (porcine) injection 5,000 Units  Every 8 hours      02/04/19 1752     IP VTE HIGH RISK PATIENT  Once      02/04/19 1752             Dariana Galvan MD  Department of Hospital Medicine   [facility]

## 2019-02-05 NOTE — ASSESSMENT & PLAN NOTE
On Trelegy at home, non-formulary, will order Breo  Received duonebs and solumedrol 125mg in clinic before ED presentation  Duo-nebs q4h  Prednisone 40mg daily  Does not appear infectious, will hold abx

## 2019-02-05 NOTE — PHARMACY MED REC
"  Admission Medication Reconciliation - Pharmacy Consult Note    The home medication history was taken by Magalys Cohen, Pharmacy Technician.  Based on information gathered and subsequent review by the clinical pharmacist, the items below may need attention.     You may go to "Admission" then "Reconcile Home Medications" tabs to review and/or act upon these items.     Potential issues to be addressed PRIOR TO DISCHARGE  o On albuterol inhaler and albuterol nebulizer at home--may have one product for home and one for on the go    Please address this information as you see fit.  Feel free to contact us if you have any questions or require assistance.    Julissa Tran  [phone]                  .  .          "

## 2019-02-05 NOTE — PROGRESS NOTES
Spoke with patient today regarding [facility]. Patient is not currently wearing a nicotine patch, but states she does have and wear them at home when she wants to apply one. Patient states she may be interested in the program after discharge to acquire more patches. She is already enrolled in the Smoking Cessation trust. Discussed the effects of smoking and benefits of quitting with patient, she verbalized understanding. She states having cut down her cigarette intake the last 3 weeks due to being sick. Contact information was provided, should patient want to schedule an appointment.

## 2019-02-05 NOTE — ASSESSMENT & PLAN NOTE
On Trelegy at home, non-formulary, will order Breo  Received duonebs and solumedrol 125mg in clinic before ED presentation  Duo-nebs q4h  Prednisone 40mg daily  On O2, continue to wean for goal SAT 88%  Consider 6min walk for home O2 needs  Does not appear infectious, will hold abx

## 2019-02-05 NOTE — PLAN OF CARE
Patient lives at home with Spouse and her mother. Drives self to work daily and health care Appointments as needed. Will monitor for discharge needs. Encouraged to call CM with concerns     02/05/19 2225   Discharge Assessment   Assessment Type Discharge Planning Assessment   Confirmed/corrected address and phone number on facesheet? Yes   Assessment information obtained from? Patient;Medical Record   Communicated expected length of stay with patient/caregiver no   Prior to hospitilization cognitive status: Alert/Oriented;No Deficits   Prior to hospitalization functional status: Independent   Current cognitive status: Alert/Oriented;No Deficits   Current Functional Status: Independent   Lives With spouse;parent(s)  (Lives with  and Mother)   Able to Return to Prior Arrangements yes   Is patient able to care for self after discharge? Yes   Who are your caregiver(s) and their phone number(s)? (Rey Kelly () [phone]  Noy Baez (Daughter) [phone])   Patient's perception of discharge disposition home or selfcare   Patient currently being followed by outpatient case management? No   Patient currently receives any other outside agency services? No   Equipment Currently Used at Home none   Do you have any problems affording any of your prescribed medications? No   Is the patient taking medications as prescribed? yes   Does the patient have transportation home? Yes   Transportation Anticipated family or friend will provide   Does the patient receive services at the Coumadin Clinic? No   Discharge Plan A Home with family   Discharge Plan B Home   DME Needed Upon Discharge  none   Patient/Family in Agreement with Plan yes   Does the patient have transportation to healthcare appointments? Yes

## 2019-02-05 NOTE — PROGRESS NOTES
[facility] Hospital Medicine  Progress Note    Patient Name: Cassia Kelly  MRN: [id number]  Patient Class: IP- Inpatient   Admission Date: 2/4/2019  Length of Stay: 1 days  Attending Physician: Silvia Pearson MD  Primary Care Provider: Marco A Rondon MD    [facility] Medicine Team: [facility] HOSP MED 5 Dariana Galvan MD    Subjective:     Principal Problem:Chronic obstructive pulmonary disease with acute exacerbation    HPI:  60 yo with COPD and a 40 pack year history of smoking who presents to ED from clinic with hypoxia. Patient reports a 4 day history of worsening shortness of breath. She states that her medication was changed from Advair to Trelegy, is not sure if associated but has had intermittent COPD symptoms over the last several weeks. Completed a course of Doxycycline several weeks ago with interval improvement. Since 2/1, pt has increase in her cough with white sputum. She has been using her 's pulse oximeter at home with oxygen saturations in the mid 80s and his supplemental oxygen at times with moderate improvement. She denies fever, chills, hemoptysis, nasal congestion, chest pain, nausea/vomiting, abdominal pain, changes in urination, peripheral edema or additional complaints.     She was seen in clinic and treated with nebs and solumedrol with symptomatic improvement and self-ambulated to the ED.         Hospital Course:  02/05/2019 NAEON. Pt on oxygen, desaturates to 88% off oxygen. Pt progressing, decreased cough.     Interval History: See hospital course    Review of Systems  Objective:     Vital Signs (Most Recent):  Temp: 98.1 °F (36.7 °C) (02/05/19 0800)  Pulse: 90 (02/05/19 0801)  Resp: 20 (02/05/19 0801)  BP: 121/79 (02/05/19 0800)  SpO2: (!) 89 % (02/05/19 0801) Vital Signs (24h Range):  Temp:  [97.9 °F (36.6 °C)-98.3 °F (36.8 °C)] 98.1 °F (36.7 °C)  Pulse:  [69-92] 90  Resp:  [16-23] 20  SpO2:  [87 %-98 %] 89 %  BP: (121-150)/(68-86) 121/79     Weight: 69.9  kg (154 lb)  Body mass index is 27.28 kg/m².    Intake/Output Summary (Last 24 hours) at 2/5/2019 0840  Last data filed at 2/5/2019 0600  Gross per 24 hour   Intake 480 ml   Output --   Net 480 ml      Physical Exam   Constitutional: She is oriented to person, place, and time. She appears well-developed and well-nourished. No distress.   HENT:   Head: Normocephalic and atraumatic.   Eyes: Pupils are equal, round, and reactive to light.   Cardiovascular: Normal rate, regular rhythm and normal heart sounds.   Pulmonary/Chest: No respiratory distress. She has no wheezes.   Decreased breath sounds globally.    Abdominal: Soft. There is no tenderness.   Neurological: She is alert and oriented to person, place, and time.   Skin: Capillary refill takes 2 to 3 seconds. She is not diaphoretic.       Significant Labs:   CBC:   Recent Labs   Lab 02/04/19  1604 02/05/19  0322   WBC 10.49 8.74   HGB 14.8 14.4   HCT 46.3 44.1    252     CMP:   Recent Labs   Lab 02/04/19  1604 02/05/19  0322    138   K 3.9 4.2    104   CO2 25 23   * 158*   BUN 12 13   CREATININE 0.7 0.6   CALCIUM 10.0 10.1   PROT  --  7.3   ALBUMIN  --  3.8   BILITOT  --  0.4   ALKPHOS  --  121   AST  --  11   ALT  --  18   ANIONGAP 11 11   EGFRNONAA >60.0 >60.0       Significant Imaging: I have reviewed all pertinent imaging results/findings within the past 24 hours.    Assessment/Plan:      * Chronic obstructive pulmonary disease with acute exacerbation    On Trelegy at home, non-formulary, will order Breo  Received duonebs and solumedrol 125mg in clinic before ED presentation  Duo-nebs q4h  Prednisone 40mg daily  On O2, continue to wean for goal SAT 88%  Consider 6min walk for home O2 needs  Does not appear infectious, will hold abx       Tobacco abuse disorder    40 pack year history of smoking.   Denies need for nicotine patch at this time.          VTE Risk Mitigation (From admission, onward)        Ordered     heparin (porcine)  injection 5,000 Units  Every 8 hours      02/04/19 1752     IP VTE HIGH RISK PATIENT  Once      02/04/19 1752              Dariana Galvan MD  Department of Hospital Medicine   [facility]

## 2019-02-05 NOTE — PLAN OF CARE
Problem: Adult Inpatient Plan of Care  Goal: Plan of Care Review  Outcome: Ongoing (interventions implemented as appropriate)   02/05/19 0601   Plan of Care Review   Plan of Care Reviewed With patient   Pt arrived to unit in stable condition. Remains free from falls and injuries during shift. Awake, alert, and oriented x 4. Vital signs stable. Oxygen WNL on nasal cannula. Able to voice needs, none at this time. Denied pain/discomfort. Cough noted. No signs of acute distress noted at this time. Bed in lowest position, wheels locked. Call light in reach. Will continue to monitor. Safety maintained.

## 2019-02-06 ENCOUNTER — TELEPHONE (OUTPATIENT)
Dept: INTERNAL MEDICINE | Facility: CLINIC | Age: 59
End: 2019-02-06

## 2019-02-06 LAB
ALBUMIN SERPL BCP-MCNC: 3.6 G/DL
ALP SERPL-CCNC: 103 U/L
ALT SERPL W/O P-5'-P-CCNC: 13 U/L
ANION GAP SERPL CALC-SCNC: 9 MMOL/L
AST SERPL-CCNC: 9 U/L
BASOPHILS # BLD AUTO: 0.03 K/UL
BASOPHILS NFR BLD: 0.2 %
BILIRUB SERPL-MCNC: 0.4 MG/DL
BUN SERPL-MCNC: 18 MG/DL
CALCIUM SERPL-MCNC: 9.2 MG/DL
CHLORIDE SERPL-SCNC: 104 MMOL/L
CO2 SERPL-SCNC: 26 MMOL/L
CREAT SERPL-MCNC: 0.6 MG/DL
DIFFERENTIAL METHOD: ABNORMAL
EOSINOPHIL # BLD AUTO: 0.1 K/UL
EOSINOPHIL NFR BLD: 0.4 %
ERYTHROCYTE [DISTWIDTH] IN BLOOD BY AUTOMATED COUNT: 13 %
EST. GFR  (AFRICAN AMERICAN): >60 ML/MIN/1.73 M^2
EST. GFR  (NON AFRICAN AMERICAN): >60 ML/MIN/1.73 M^2
GLUCOSE SERPL-MCNC: 95 MG/DL
HCT VFR BLD AUTO: 39.7 %
HGB BLD-MCNC: 13.2 G/DL
IMM GRANULOCYTES # BLD AUTO: 0.06 K/UL
IMM GRANULOCYTES NFR BLD AUTO: 0.4 %
LYMPHOCYTES # BLD AUTO: 3.4 K/UL
LYMPHOCYTES NFR BLD: 24.3 %
MCH RBC QN AUTO: 31.4 PG
MCHC RBC AUTO-ENTMCNC: 33.2 G/DL
MCV RBC AUTO: 95 FL
MONOCYTES # BLD AUTO: 0.7 K/UL
MONOCYTES NFR BLD: 5.3 %
NEUTROPHILS # BLD AUTO: 9.6 K/UL
NEUTROPHILS NFR BLD: 69.4 %
NRBC BLD-RTO: 0 /100 WBC
PLATELET # BLD AUTO: 235 K/UL
PMV BLD AUTO: 11.2 FL
POTASSIUM SERPL-SCNC: 3.9 MMOL/L
PROT SERPL-MCNC: 6.5 G/DL
RBC # BLD AUTO: 4.2 M/UL
SODIUM SERPL-SCNC: 139 MMOL/L
WBC # BLD AUTO: 13.87 K/UL

## 2019-02-06 PROCEDURE — 99222 PR INITIAL HOSPITAL CARE,LEVL II: ICD-10-PCS | Mod: ,,, | Performed by: INTERNAL MEDICINE

## 2019-02-06 PROCEDURE — 27000221 HC OXYGEN, UP TO 24 HOURS

## 2019-02-06 PROCEDURE — 25000242 PHARM REV CODE 250 ALT 637 W/ HCPCS: Performed by: STUDENT IN AN ORGANIZED HEALTH CARE EDUCATION/TRAINING PROGRAM

## 2019-02-06 PROCEDURE — 63600175 PHARM REV CODE 636 W HCPCS: Performed by: STUDENT IN AN ORGANIZED HEALTH CARE EDUCATION/TRAINING PROGRAM

## 2019-02-06 PROCEDURE — 25000003 PHARM REV CODE 250: Performed by: STUDENT IN AN ORGANIZED HEALTH CARE EDUCATION/TRAINING PROGRAM

## 2019-02-06 PROCEDURE — 97165 OT EVAL LOW COMPLEX 30 MIN: CPT

## 2019-02-06 PROCEDURE — 94640 AIRWAY INHALATION TREATMENT: CPT

## 2019-02-06 PROCEDURE — 11000001 HC ACUTE MED/SURG PRIVATE ROOM

## 2019-02-06 PROCEDURE — 85025 COMPLETE CBC W/AUTO DIFF WBC: CPT

## 2019-02-06 PROCEDURE — 63600175 PHARM REV CODE 636 W HCPCS: Performed by: INTERNAL MEDICINE

## 2019-02-06 PROCEDURE — 36415 COLL VENOUS BLD VENIPUNCTURE: CPT

## 2019-02-06 PROCEDURE — 97161 PT EVAL LOW COMPLEX 20 MIN: CPT

## 2019-02-06 PROCEDURE — 99222 1ST HOSP IP/OBS MODERATE 55: CPT | Mod: ,,, | Performed by: INTERNAL MEDICINE

## 2019-02-06 PROCEDURE — 80053 COMPREHEN METABOLIC PANEL: CPT

## 2019-02-06 PROCEDURE — 94761 N-INVAS EAR/PLS OXIMETRY MLT: CPT

## 2019-02-06 RX ORDER — AZITHROMYCIN 250 MG/1
500 TABLET, FILM COATED ORAL ONCE
Status: COMPLETED | OUTPATIENT
Start: 2019-02-06 | End: 2019-02-06

## 2019-02-06 RX ORDER — POLYETHYLENE GLYCOL 3350 17 G/17G
17 POWDER, FOR SOLUTION ORAL DAILY
Status: DISCONTINUED | OUTPATIENT
Start: 2019-02-07 | End: 2019-02-07 | Stop reason: HOSPADM

## 2019-02-06 RX ORDER — RAMELTEON 8 MG/1
8 TABLET ORAL NIGHTLY PRN
Status: DISCONTINUED | OUTPATIENT
Start: 2019-02-07 | End: 2019-02-07 | Stop reason: HOSPADM

## 2019-02-06 RX ORDER — AZITHROMYCIN 250 MG/1
250 TABLET, FILM COATED ORAL DAILY
Status: DISCONTINUED | OUTPATIENT
Start: 2019-02-07 | End: 2019-02-07 | Stop reason: HOSPADM

## 2019-02-06 RX ORDER — SENNOSIDES 8.6 MG/1
8.6 TABLET ORAL DAILY PRN
Status: DISCONTINUED | OUTPATIENT
Start: 2019-02-06 | End: 2019-02-07 | Stop reason: HOSPADM

## 2019-02-06 RX ADMIN — PREDNISONE 40 MG: 20 TABLET ORAL at 09:02

## 2019-02-06 RX ADMIN — IPRATROPIUM BROMIDE AND ALBUTEROL SULFATE 3 ML: .5; 3 SOLUTION RESPIRATORY (INHALATION) at 08:02

## 2019-02-06 RX ADMIN — TIOTROPIUM BROMIDE 18 MCG: 18 CAPSULE ORAL; RESPIRATORY (INHALATION) at 09:02

## 2019-02-06 RX ADMIN — BENZONATATE 100 MG: 100 CAPSULE ORAL at 09:02

## 2019-02-06 RX ADMIN — HEPARIN SODIUM 5000 UNITS: 5000 INJECTION, SOLUTION INTRAVENOUS; SUBCUTANEOUS at 10:02

## 2019-02-06 RX ADMIN — HEPARIN SODIUM 5000 UNITS: 5000 INJECTION, SOLUTION INTRAVENOUS; SUBCUTANEOUS at 02:02

## 2019-02-06 RX ADMIN — BENZONATATE 100 MG: 100 CAPSULE ORAL at 06:02

## 2019-02-06 RX ADMIN — IPRATROPIUM BROMIDE AND ALBUTEROL SULFATE 3 ML: .5; 3 SOLUTION RESPIRATORY (INHALATION) at 03:02

## 2019-02-06 RX ADMIN — FLUTICASONE FUROATE AND VILANTEROL TRIFENATATE 1 PUFF: 200; 25 POWDER RESPIRATORY (INHALATION) at 09:02

## 2019-02-06 RX ADMIN — IPRATROPIUM BROMIDE AND ALBUTEROL SULFATE 3 ML: .5; 3 SOLUTION RESPIRATORY (INHALATION) at 12:02

## 2019-02-06 RX ADMIN — AZITHROMYCIN 500 MG: 250 TABLET, FILM COATED ORAL at 02:02

## 2019-02-06 NOTE — TELEPHONE ENCOUNTER
----- Message from Toyin Rivero sent at 2/6/2019  3:05 PM CST -----  Contact: Patient [phone]  Caller is requesting a sooner appointment. Caller declined first available appointment listed below. Caller will not accept being placed on the wait list and is requesting a message be sent to the provider.    When is the next available appointment: 5/17/19   Did you offer to schedule the next available appt and put the patient on the wait list?:   Yes  What visit type: EP  Symptoms:  COPD/ Evaluate for disability  Patient preference of timeframe to be scheduled:  ASAP  What is the reason the patient is requesting a sooner appointment? (insurance terminating, changing jobs):  Evaluation for disability  Would you prefer an answer via Studiot?: No   Comments:  She also wants to know if you can do this as a telephone consult.

## 2019-02-06 NOTE — PLAN OF CARE
Problem: Occupational Therapy Goal  Goal: Occupational Therapy Goal  Eval complete, no OT needs at this time.  Safe to return home when medically clear.  Reorder OT if status deteriorates.      Outcome: Outcome(s) achieved Date Met: 02/06/19  Eval complete. Pt tolerated session well. D/C from OT.

## 2019-02-06 NOTE — PT/OT/SLP EVAL
Occupational Therapy   Evaluation and Discharge Note    Name: Cassia Kelly  MRN: [id number]  Admitting Diagnosis:  Chronic obstructive pulmonary disease with acute exacerbation      Recommendations:     Discharge Recommendations: (HOME)  Discharge Equipment Recommendations:  other (see comments)(Oxygen)  Barriers to discharge:  None    Assessment:     Cassia Kelly is a 59 y.o. female with a medical diagnosis of Chronic obstructive pulmonary disease with acute exacerbation. At this time, patient is functioning at their prior level of function and does not require further acute OT services.     Plan:     During this hospitalization, patient does not require further acute OT services.  Please re-consult if situation changes.    · Plan of Care Reviewed with: patient    Subjective     Chief Complaint: SOB  Patient/Family Comments/goals: Medical management and discharge.       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     Pain/Comfort:  · Pain Rating 1: 0/10  · Pain Rating Post-Intervention 1: 0/10    Patients cultural, spiritual, Sabianist conflicts given the current situation: no    Objective:     Communicated with: RN prior to session.  Patient found HOB elevated with oxygen upon OT entry to room.    General Precautions: Standard, fall   Orthopedic Precautions:N/A   Braces: N/A     Occupational Performance:    Bed Mobility:    · Patient completed Rolling/Turning to Left with  independence  · Patient completed Rolling/Turning to Right with independence  · Patient completed Scooting/Bridging with independence  · Patient completed Supine to Sit with independence  · Patient completed Sit to Supine with  independence    Functional Mobility/Transfers:  · Patient completed Sit <> Stand Transfer with independence  with  no assistive device   · Functional Mobility: Able to walk Independent for long household.     Activities of Daily Living:  · Pt reports Independent w/ all ADLs/iadls     Cognitive/Visual Perceptual:  Completely cognitively intact adult w/ no glasses worn or present during eval.       Physical Exam:  Balance:    -       sitting: good, standing: good  Postural examination/scapula alignment:    -       Rounded shoulders  -       Forward head  -       Posterior pelvic tilt  Motor Planning:    -       normal  Dominant hand:    -       RH  Upper Extremity Range of Motion:     -       Right Upper Extremity: WFL  -       Left Upper Extremity: WFL  Upper Extremity Strength:    -       Right Upper Extremity: WFL  -       Left Upper Extremity: WFL   Strength:    -       Right Upper Extremity: WFL  -       Left Upper Extremity: WFL  Fine Motor Coordination:    Gross motor coordination:   WFL    AMPAC 6 Click ADL:  AMPAC Total Score: 24    Treatment & Education:  -Pt edu on OT role/POC  -Importance of OOB activity with staff assistance  -Safety during functional t/f and mobility  - White board updated  - Multiple self care tasks/functional mobility completed-- assistance level noted above  - All questions/concerns answered within OT scope of practice    Education:    Patient left up in chair with all lines intact, call button in reach and RN notified    GOALS:   Multidisciplinary Problems     Occupational Therapy Goals     Not on file          Multidisciplinary Problems (Resolved)        Problem: Occupational Therapy Goal    Goal Priority Disciplines Outcome Interventions   Occupational Therapy Goal   (Resolved)     OT, PT/OT Outcome(s) achieved    Description:  Eval complete, no OT needs at this time.  Safe to return home when medically clear.  Reorder OT if status deteriorates.                        History:  "    Past Medical History:   Diagnosis Date    Abnormal Pap smear     Hyst    Acute cystitis 2018    Adenomatous polyp of colon 2015    Repeat c-scope 5 yrs     Cervical cancer     COPD (chronic obstructive pulmonary disease)     Microscopic hematuria 2013    Smoker 2015    STD (sexually transmitted disease)     Ureteral calculus, left 2013       Past Surgical History:   Procedure Laterality Date    APPENDECTOMY       SECTION      x1    COLONOSCOPY N/A 2015    Performed by Milan [facility] ([facility] F[facility])    HYSTERECTOMY      LAVERN/LSO (Abn Pap)    OOPHORECTOMY      Vocal chord nodules removed         Clinical Decision Makin.  OT Low:  "Pt evaluation falls under low complexity for evaluation coding due to performance deficits noted in 1-3 areas as stated above and 0 co-morbities affecting current functional status. Data obtained from problem focused assessments. No modifications or assistance was required for completion of evaluation. Only brief occupational profile and history review completed."     Time Tracking:     OT Date of Treatment: 19  OT Start Time: 1031  OT Stop Time: 1039  OT Total Time (min): 8 min    Billable Minutes:Evaluation 8 mins    Wagner Tucker, OT  2019    "

## 2019-02-06 NOTE — PT/OT/SLP EVAL
"Physical Therapy Evaluation and Discharge Note    Patient Name:  Cassia Kelly   MRN:  [id number]    Recommendations:     Discharge Recommendations:  (Home (may benefit from OPPT/OT))   Discharge Equipment Recommendations: (oxygen)   Barriers to discharge: None    Assessment:     Cassia Kelly is a 59 y.o. female admitted with a medical diagnosis of Chronic obstructive pulmonary disease with acute exacerbation. .  At this time, patient is functioning at their prior level of function and does not require further acute PT services.     Pt demo adequate strength to perform functional mobility independently - currently limited by impaired cardiopulmonary response to activities.     Recent Surgery: * No surgery found *      Plan:     During this hospitalization, patient does not require further acute PT services.  Please re-consult if situation changes.      Subjective     Chief Complaint: SOB  Patient/Family Comments/goals: "I don't think I can go back to work yet." "I don't want to go to work with a tank."  Pain/Comfort:  · Pain Rating 1: 0/10    Patients cultural, spiritual, Yazidism conflicts given the current situation: no    Living Environment:  Pt lives c  and mother in 2SH 0STE.  Living quarters on 1st floor. PTA driving, working as  and no falls   Prior to admission, patients level of function was independent.  Equipment used at home: walker, rolling, rollator, shower chair.  DME owned (not currently used): none.  Upon discharge, patient will have assistance from family.    Objective:     Communicated with RN and OT prior to session.  Patient found HOB elevated upon PT entry to room found with: peripheral IV, telemetry, pulse ox (continuous)     General Precautions: Standard, fall   Orthopedic Precautions:N/A   Braces: N/A     Exams:  · Cognitive Exam:  Patient is oriented to Person, Place, Time and Situation  · Gross Motor Coordination:  WFL  · Sensation:    · -       " Intact  · RLE ROM: WFL  · RLE Strength: WFL  · LLE ROM: WFL  · LLE Strength: WFL    Functional Mobility:  · Bed Mobility:     · Rolling Right: independence  · Scooting: independence  · Supine to Sit: independence  · Transfers:     · Sit to Stand:  independence with no AD  · Gait: 150ft c no AD (I)  · Steady gait; no deficits  · Balance: standing (I)  · Stairs:  Pt ascended/descended 5 stair(s) with No Assistive Device with right handrail with Independent.   · Reciprocal gait pattern    AM-PAC 6 CLICK MOBILITY  Total Score:24       Therapeutic Activities and Exercises:  Pt educated on: PT role/POC; safety c mobility; benefits of OOB activities; performing therex; d/c recs - v/u  -O2 sats on RA: at rest (93%); standing (90%); following gait and stairs assessment (84%)  -O2 sats increased to 91% on RA following 2min seated rest.    AM-PAC 6 CLICK MOBILITY  Total Score:24     Patient left sitting EOB with all lines intact, call button in reach, and RN notified.    GOALS:   Multidisciplinary Problems     Physical Therapy Goals     Not on file          Multidisciplinary Problems (Resolved)        Problem: Physical Therapy Goal    Goal Priority Disciplines Outcome Goal Variances Interventions   Physical Therapy Goal   (Resolved)     PT, PT/OT Outcome(s) achieved     Description:  Pt does not require additional acute PT services at this time d/t independent c functional mobility. Pt amb 150 ft c no AD (I).    Pt educated on asking medical staff for PT consult if changes in functional status occurs. - v/u                          History:     Past Medical History:   Diagnosis Date    [facility] 1987    [facility]    Acute cystitis 1/24/2018    Adenomatous polyp of colon 11/23/2015    Repeat c-scope 5 yrs     Cervical cancer     COPD (chronic obstructive pulmonary disease)     Microscopic hematuria 8/13/2013    Smoker 11/23/2015    STD (sexually transmitted disease)     Ureteral calculus, left 9/6/2013       Past  Surgical History:   Procedure Laterality Date    APPENDECTOMY       SECTION      x1    COLONOSCOPY N/A 2015    [facility] (4TH FLR)    HYSTERECTOMY  1987    LAVERN/LSO (Abn Pap)    OOPHORECTOMY      Vocal chord nodules removed         Clinical Decision Making:     History  Co-morbidities and personal factors that may impact the plan of care Examination  Body Structures and Functions, activity limitations and participation restrictions that may impact the plan of care Clinical Presentation   Decision Making/ Complexity Score   Co-morbidities:   [] Time since onset of injury / illness / exacerbation  [] Status of current condition  []Patient's cognitive status and safety concerns    [] Multiple Medical Problems (see med hx)  Personal Factors:   [] Patient's age  [] Prior Level of function   [] Patient's home situation (environment and family support)  [] Patient's level of motivation  [] Expected progression of patient      HISTORY:(criteria)    [] 66672 - no personal factors/history    [] 88455 - has 1-2 personal factor/comorbidity     [] 31365 - has >3 personal factor/comorbidity     Body Regions:  [] Objective examination findings  [] Head     []  Neck  [] Trunk   [] Upper Extremity  [] Lower Extremity    Body Systems:  [] For communication ability, affect, cognition, language, and learning style: the assessment of the ability to make needs known, consciousness, orientation (person, place, and time), expected emotional /behavioral responses, and learning preferences (eg, learning barriers, education  needs)  [] For the neuromuscular system: a general assessment of gross coordinated movement (eg, balance, gait, locomotion, transfers, and transitions) and motor function  (motor control and motor learning)  [] For the musculoskeletal system: the assessment of gross symmetry, gross range of motion, gross strength, height, and weight  [] For the integumentary system: the  assessment of pliability(texture), presence of scar formation, skin color, and skin integrity  [] For cardiovascular/pulmonary system: the assessment of heart rate, respiratory rate, blood pressure, and edema     Activity limitations:    [] Patient's cognitive status and saf ety concerns          [] Status of current condition      [] Weight bearing restriction  [] Cardiopulmunary Restriction    Participation Restrictions:   [] Goals and goal agreement with the patient     [] Rehab potential (prognosis) and probable outcome      Examination of Body System: (criteria)    [] 34984 - addressing 1-2 elements    [] 83016 - addressing a total of 3 or more elements     [] 39889 -  Addressing a total of 4 or more elements         Clinical Presentation: (criteria)  Choose one     On examination of body system using standardized tests and measures patient presents with (CHOOSE ONE) elements from any of the following: body structures and functions, activity limitations, and/or participation restrictions.  Leading to a clinical presentation that is considered (CHOOSE ONE)                              Clinical Decision Making  (Eval Complexity):  Choose One     Time Tracking:     PT Received On: 02/06/19  PT Start Time: 1028     PT Stop Time: 1043  PT Total Time (min): 15 min     Billable Minutes: Evaluation 15 min      Radu Ch, PT  02/06/2019

## 2019-02-06 NOTE — SUBJECTIVE & OBJECTIVE
Interval History: NAEON.    Review of Systems   Constitutional: Negative for chills, diaphoresis and fever.   Respiratory: Negative for cough, shortness of breath and wheezing.    Cardiovascular: Negative for chest pain, palpitations and leg swelling.   Gastrointestinal: Negative for abdominal distention, diarrhea, nausea and vomiting.   Genitourinary: Negative for difficulty urinating and dysuria.   Musculoskeletal: Negative.    Neurological: Negative.    Hematological: Negative.    Psychiatric/Behavioral: Negative.      Objective:     Vital Signs (Most Recent):  Temp: 97.9 °F (36.6 °C) (02/06/19 1215)  Pulse: 72 (02/06/19 1532)  Resp: 19 (02/06/19 1532)  BP: 125/82 (02/06/19 1215)  SpO2: 96 % (02/06/19 1532) Vital Signs (24h Range):  Temp:  [97.9 °F (36.6 °C)-98.8 °F (37.1 °C)] 97.9 °F (36.6 °C)  Pulse:  [] 72  Resp:  [16-23] 19  SpO2:  [89 %-96 %] 96 %  BP: (106-125)/(64-82) 125/82     Weight: 69.9 kg (154 lb)  Body mass index is 27.28 kg/m².    Intake/Output Summary (Last 24 hours) at 2/6/2019 1543  Last data filed at 2/6/2019 1200  Gross per 24 hour   Intake 1190 ml   Output --   Net 1190 ml      Physical Exam   Constitutional: She is oriented to person, place, and time. She appears well-developed and well-nourished. No distress.   HENT:   Head: Normocephalic and atraumatic.   Eyes: Pupils are equal, round, and reactive to light.   Cardiovascular: Normal rate, regular rhythm and normal heart sounds.   Pulmonary/Chest: No respiratory distress. She has no wheezes.   Improved air movement.   Abdominal: Soft. There is no tenderness.   Neurological: She is alert and oriented to person, place, and time.   Skin: She is not diaphoretic.       Significant Labs:   CBC:   Recent Labs   Lab 02/04/19  1604 02/05/19  0322 02/06/19  0532   WBC 10.49 8.74 13.87*   HGB 14.8 14.4 13.2   HCT 46.3 44.1 39.7    252 235     CMP:   Recent Labs   Lab 02/04/19  1604 02/05/19  0322 02/06/19  0532    138 139   K 3.9  4.2 3.9    104 104   CO2 25 23 26   * 158* 95   BUN 12 13 18   CREATININE 0.7 0.6 0.6   CALCIUM 10.0 10.1 9.2   PROT  --  7.3 6.5   ALBUMIN  --  3.8 3.6   BILITOT  --  0.4 0.4   ALKPHOS  --  121 103   AST  --  11 9*   ALT  --  18 13   ANIONGAP 11 11 9   EGFRNONAA >60.0 >60.0 >60.0       Significant Imaging: I have reviewed all pertinent imaging results/findings within the past 24 hours.

## 2019-02-06 NOTE — PLAN OF CARE
Problem: Physical Therapy Goal  Goal: Physical Therapy Goal  Pt does not require additional acute PT services at this time d/t independent c functional mobility. Pt amb 150 ft c no AD (I).    Pt educated on asking medical staff for PT consult if changes in functional status occurs. - v/u        Outcome: Outcome(s) achieved Date Met: 02/06/19  Eval and D/C from acute PT services    Radu Ch DPT  2/6/2019

## 2019-02-06 NOTE — ASSESSMENT & PLAN NOTE
On Trelegy at home, non-formulary, will order Breo  Received duonebs and solumedrol 125mg in clinic before ED presentation    Duo-nebs q4h  Prednisone 40mg daily  On O2 as needed, continue to wean for goal SAT 88%. Saturating at 89-90% on room air.  Plan to d/c with home oxygen. O2 sats at 87% with walking.  Does not appear infectious, will hold abx

## 2019-02-06 NOTE — PROGRESS NOTES
[facility] Hospital Medicine  Progress Note    Patient Name: Cassia Kelly  MRN: [id number]  Patient Class: IP- Inpatient   Admission Date: 2/4/2019  Length of Stay: 2 days  Attending Physician: Silvia Pearson MD  Primary Care Provider: Marco A Rondon MD    [facility] Medicine Team: [facility] HOSP MED 5 Marga Yusuf DO    Subjective:     Principal Problem:Chronic obstructive pulmonary disease with acute exacerbation    HPI:  60 yo with COPD and a 40 pack year history of smoking who presents to ED from clinic with hypoxia. Patient reports a 4 day history of worsening shortness of breath. She states that her medication was changed from Advair to Trelegy, is not sure if associated but has had intermittent COPD symptoms over the last several weeks. Completed a course of Doxycycline several weeks ago with interval improvement. Since 2/1, pt has increase in her cough with white sputum. She has been using her 's pulse oximeter at home with oxygen saturations in the mid 80s and his supplemental oxygen at times with moderate improvement. She denies fever, chills, hemoptysis, nasal congestion, chest pain, nausea/vomiting, abdominal pain, changes in urination, peripheral edema or additional complaints.     She was seen in clinic and treated with nebs and solumedrol with symptomatic improvement and self-ambulated to the ED.         Hospital Course:  02/05/2019 NAEON. Pt on oxygen, desaturates to 88% off oxygen. Pt progressing, decreased cough.   02/06/2019: NAEON. Patient saturating at 89-91% without oxygen. SPO2 decreased to 87% while walking. Plan to d/c with home oxygen for use with activity.    Interval History: NAEON.    Review of Systems   Constitutional: Negative for chills, diaphoresis and fever.   Respiratory: Negative for cough, shortness of breath and wheezing.    Cardiovascular: Negative for chest pain, palpitations and leg swelling.   Gastrointestinal: Negative for abdominal distention,  diarrhea, nausea and vomiting.   Genitourinary: Negative for difficulty urinating and dysuria.   Musculoskeletal: Negative.    Neurological: Negative.    Hematological: Negative.    Psychiatric/Behavioral: Negative.      Objective:     Vital Signs (Most Recent):  Temp: 97.9 °F (36.6 °C) (02/06/19 1215)  Pulse: 72 (02/06/19 1532)  Resp: 19 (02/06/19 1532)  BP: 125/82 (02/06/19 1215)  SpO2: 96 % (02/06/19 1532) Vital Signs (24h Range):  Temp:  [97.9 °F (36.6 °C)-98.8 °F (37.1 °C)] 97.9 °F (36.6 °C)  Pulse:  [] 72  Resp:  [16-23] 19  SpO2:  [89 %-96 %] 96 %  BP: (106-125)/(64-82) 125/82     Weight: 69.9 kg (154 lb)  Body mass index is 27.28 kg/m².    Intake/Output Summary (Last 24 hours) at 2/6/2019 1543  Last data filed at 2/6/2019 1200  Gross per 24 hour   Intake 1190 ml   Output --   Net 1190 ml      Physical Exam   Constitutional: She is oriented to person, place, and time. She appears well-developed and well-nourished. No distress.   HENT:   Head: Normocephalic and atraumatic.   Eyes: Pupils are equal, round, and reactive to light.   Cardiovascular: Normal rate, regular rhythm and normal heart sounds.   Pulmonary/Chest: No respiratory distress. She has no wheezes.   Improved air movement.   Abdominal: Soft. There is no tenderness.   Neurological: She is alert and oriented to person, place, and time.   Skin: She is not diaphoretic.       Significant Labs:   CBC:   Recent Labs   Lab 02/04/19  1604 02/05/19  0322 02/06/19  0532   WBC 10.49 8.74 13.87*   HGB 14.8 14.4 13.2   HCT 46.3 44.1 39.7    252 235     CMP:   Recent Labs   Lab 02/04/19  1604 02/05/19  0322 02/06/19  0532    138 139   K 3.9 4.2 3.9    104 104   CO2 25 23 26   * 158* 95   BUN 12 13 18   CREATININE 0.7 0.6 0.6   CALCIUM 10.0 10.1 9.2   PROT  --  7.3 6.5   ALBUMIN  --  3.8 3.6   BILITOT  --  0.4 0.4   ALKPHOS  --  121 103   AST  --  11 9*   ALT  --  18 13   ANIONGAP 11 11 9   EGFRNONAA >60.0 >60.0 >60.0        Significant Imaging: I have reviewed all pertinent imaging results/findings within the past 24 hours.    Assessment/Plan:      * Chronic obstructive pulmonary disease with acute exacerbation    On Trelegy at home, non-formulary, will order Breo  Received duonebs and solumedrol 125mg in clinic before ED presentation    Duo-nebs q4h  Prednisone 40mg daily  On O2 as needed, continue to wean for goal SAT 88%. Saturating at 89-90% on room air.  Plan to d/c with home oxygen. O2 sats at 87% with walking.  Does not appear infectious, will hold abx       Tobacco abuse disorder    40 pack year history of smoking.   Denies need for nicotine patch at this time.          VTE Risk Mitigation (From admission, onward)        Ordered     heparin (porcine) injection 5,000 Units  Every 8 hours      02/04/19 1752     IP VTE HIGH RISK PATIENT  Once      02/04/19 1752              Marga Yusuf DO  Department of Hospital Medicine   [facility]

## 2019-02-06 NOTE — PLAN OF CARE
Problem: Adult Inpatient Plan of Care  Goal: Plan of Care Review  Outcome: Ongoing (interventions implemented as appropriate)  No acute events throughout night. Pt AAO X 4; able to express needs.  C/o generalized pain.   Safety maintained. VIsi monitoring.   Bed in low position, call  light in reach.    Will continue to monitor

## 2019-02-07 ENCOUNTER — TELEPHONE (OUTPATIENT)
Dept: INTERNAL MEDICINE | Facility: CLINIC | Age: 59
End: 2019-02-07

## 2019-02-07 VITALS
WEIGHT: 154 LBS | SYSTOLIC BLOOD PRESSURE: 122 MMHG | HEART RATE: 66 BPM | HEIGHT: 63 IN | DIASTOLIC BLOOD PRESSURE: 66 MMHG | OXYGEN SATURATION: 90 % | RESPIRATION RATE: 18 BRPM | TEMPERATURE: 98 F | BODY MASS INDEX: 27.29 KG/M2

## 2019-02-07 DIAGNOSIS — J44.1 CHRONIC OBSTRUCTIVE PULMONARY DISEASE WITH ACUTE EXACERBATION: ICD-10-CM

## 2019-02-07 DIAGNOSIS — J44.1 COPD EXACERBATION: Primary | ICD-10-CM

## 2019-02-07 LAB
ALBUMIN SERPL BCP-MCNC: 3.4 G/DL
ALP SERPL-CCNC: 104 U/L
ALT SERPL W/O P-5'-P-CCNC: 13 U/L
ANION GAP SERPL CALC-SCNC: 9 MMOL/L
AST SERPL-CCNC: 8 U/L
BASOPHILS # BLD AUTO: 0.04 K/UL
BASOPHILS NFR BLD: 0.3 %
BILIRUB SERPL-MCNC: 0.4 MG/DL
BUN SERPL-MCNC: 19 MG/DL
CALCIUM SERPL-MCNC: 9 MG/DL
CHLORIDE SERPL-SCNC: 105 MMOL/L
CO2 SERPL-SCNC: 26 MMOL/L
CREAT SERPL-MCNC: 0.6 MG/DL
DIFFERENTIAL METHOD: ABNORMAL
EOSINOPHIL # BLD AUTO: 0.1 K/UL
EOSINOPHIL NFR BLD: 0.9 %
ERYTHROCYTE [DISTWIDTH] IN BLOOD BY AUTOMATED COUNT: 12.9 %
EST. GFR  (AFRICAN AMERICAN): >60 ML/MIN/1.73 M^2
EST. GFR  (NON AFRICAN AMERICAN): >60 ML/MIN/1.73 M^2
GLUCOSE SERPL-MCNC: 92 MG/DL
HCT VFR BLD AUTO: 40.2 %
HGB BLD-MCNC: 13.2 G/DL
IMM GRANULOCYTES # BLD AUTO: 0.06 K/UL
IMM GRANULOCYTES NFR BLD AUTO: 0.5 %
LYMPHOCYTES # BLD AUTO: 3.9 K/UL
LYMPHOCYTES NFR BLD: 33.4 %
MCH RBC QN AUTO: 31.4 PG
MCHC RBC AUTO-ENTMCNC: 32.8 G/DL
MCV RBC AUTO: 96 FL
MONOCYTES # BLD AUTO: 0.7 K/UL
MONOCYTES NFR BLD: 6.2 %
NEUTROPHILS # BLD AUTO: 6.9 K/UL
NEUTROPHILS NFR BLD: 58.7 %
NRBC BLD-RTO: 0 /100 WBC
PLATELET # BLD AUTO: 228 K/UL
PMV BLD AUTO: 11.3 FL
POTASSIUM SERPL-SCNC: 3.9 MMOL/L
PROT SERPL-MCNC: 6.3 G/DL
RBC # BLD AUTO: 4.21 M/UL
SODIUM SERPL-SCNC: 140 MMOL/L
WBC # BLD AUTO: 11.68 K/UL

## 2019-02-07 PROCEDURE — 25000003 PHARM REV CODE 250: Performed by: STUDENT IN AN ORGANIZED HEALTH CARE EDUCATION/TRAINING PROGRAM

## 2019-02-07 PROCEDURE — 85025 COMPLETE CBC W/AUTO DIFF WBC: CPT

## 2019-02-07 PROCEDURE — 80053 COMPREHEN METABOLIC PANEL: CPT

## 2019-02-07 PROCEDURE — 25000242 PHARM REV CODE 250 ALT 637 W/ HCPCS: Performed by: STUDENT IN AN ORGANIZED HEALTH CARE EDUCATION/TRAINING PROGRAM

## 2019-02-07 PROCEDURE — 63600175 PHARM REV CODE 636 W HCPCS: Performed by: STUDENT IN AN ORGANIZED HEALTH CARE EDUCATION/TRAINING PROGRAM

## 2019-02-07 PROCEDURE — 94640 AIRWAY INHALATION TREATMENT: CPT

## 2019-02-07 PROCEDURE — 63600175 PHARM REV CODE 636 W HCPCS: Performed by: INTERNAL MEDICINE

## 2019-02-07 PROCEDURE — 36415 COLL VENOUS BLD VENIPUNCTURE: CPT

## 2019-02-07 PROCEDURE — 94761 N-INVAS EAR/PLS OXIMETRY MLT: CPT

## 2019-02-07 PROCEDURE — 99239 PR HOSPITAL DISCHARGE DAY,>30 MIN: ICD-10-PCS | Mod: ,,, | Performed by: INTERNAL MEDICINE

## 2019-02-07 PROCEDURE — 99900035 HC TECH TIME PER 15 MIN (STAT)

## 2019-02-07 PROCEDURE — 27000221 HC OXYGEN, UP TO 24 HOURS

## 2019-02-07 PROCEDURE — 99239 HOSP IP/OBS DSCHRG MGMT >30: CPT | Mod: ,,, | Performed by: INTERNAL MEDICINE

## 2019-02-07 RX ORDER — BENZONATATE 100 MG/1
100 CAPSULE ORAL 3 TIMES DAILY PRN
Qty: 90 CAPSULE | Refills: 0 | Status: CANCELLED | OUTPATIENT
Start: 2019-02-07 | End: 2019-03-09

## 2019-02-07 RX ORDER — AZITHROMYCIN 250 MG/1
250 TABLET, FILM COATED ORAL DAILY
Qty: 3 TABLET | Refills: 0 | Status: SHIPPED | OUTPATIENT
Start: 2019-02-08 | End: 2019-02-11

## 2019-02-07 RX ORDER — PREDNISONE 20 MG/1
40 TABLET ORAL DAILY
Qty: 2 TABLET | Refills: 0 | Status: CANCELLED | OUTPATIENT
Start: 2019-02-08 | End: 2019-02-10

## 2019-02-07 RX ORDER — PREDNISONE 20 MG/1
40 TABLET ORAL DAILY
Qty: 4 TABLET | Refills: 0 | Status: SHIPPED | OUTPATIENT
Start: 2019-02-08 | End: 2019-02-10

## 2019-02-07 RX ORDER — RAMELTEON 8 MG/1
8 TABLET ORAL NIGHTLY PRN
Qty: 30 TABLET | Refills: 0 | Status: CANCELLED | OUTPATIENT
Start: 2019-02-07

## 2019-02-07 RX ORDER — AZITHROMYCIN 250 MG/1
250 TABLET, FILM COATED ORAL DAILY
Qty: 3 TABLET | Refills: 0 | Status: CANCELLED | OUTPATIENT
Start: 2019-02-08 | End: 2019-02-11

## 2019-02-07 RX ADMIN — POLYETHYLENE GLYCOL 3350 17 G: 17 POWDER, FOR SOLUTION ORAL at 09:02

## 2019-02-07 RX ADMIN — TIOTROPIUM BROMIDE 18 MCG: 18 CAPSULE ORAL; RESPIRATORY (INHALATION) at 09:02

## 2019-02-07 RX ADMIN — RAMELTEON 8 MG: 8 TABLET, FILM COATED ORAL at 12:02

## 2019-02-07 RX ADMIN — BENZONATATE 100 MG: 100 CAPSULE ORAL at 12:02

## 2019-02-07 RX ADMIN — IPRATROPIUM BROMIDE AND ALBUTEROL SULFATE 3 ML: .5; 3 SOLUTION RESPIRATORY (INHALATION) at 09:02

## 2019-02-07 RX ADMIN — HEPARIN SODIUM 5000 UNITS: 5000 INJECTION, SOLUTION INTRAVENOUS; SUBCUTANEOUS at 05:02

## 2019-02-07 RX ADMIN — PREDNISONE 40 MG: 20 TABLET ORAL at 09:02

## 2019-02-07 RX ADMIN — AZITHROMYCIN 250 MG: 250 TABLET, FILM COATED ORAL at 09:02

## 2019-02-07 RX ADMIN — SENNOSIDES 8.6 MG: 8.6 TABLET, FILM COATED ORAL at 12:02

## 2019-02-07 RX ADMIN — FLUTICASONE FUROATE AND VILANTEROL TRIFENATATE 1 PUFF: 200; 25 POWDER RESPIRATORY (INHALATION) at 09:02

## 2019-02-07 NOTE — PLAN OF CARE
Problem: Adult Inpatient Plan of Care  Goal: Plan of Care Review  Outcome: Ongoing (interventions implemented as appropriate)  No acute events throughout night. Pt AAO X 4; able to express needs. Sleeping pill and med for constipation ordered and given.  No c/o pain.  Safety maintained.  Visi monitoring. .   Bed in low position, call  light in reach.    Will continue to monitor

## 2019-02-07 NOTE — TELEPHONE ENCOUNTER
----- Message from Dalila Brand sent at 2/7/2019  4:01 PM CST -----  Contact: Pt Home [phone] or Mobile [phone]   Patient would like to put in an order for a nebulizer machine please. She would like for the order to be sent to..    Patient's pharmacy [facility]: [address], [place], [address]  Phone# [phone], Fax# [phone]    Comment: Patient was just released from the hospital and she would like to have this order filled rigth away please.

## 2019-02-07 NOTE — TELEPHONE ENCOUNTER
Message left nothing available any sooner with Dr Rondon and he doesn't decide disability and more than likely that would come from her pulmonary physician.

## 2019-02-07 NOTE — PLAN OF CARE
Problem: Adult Inpatient Plan of Care  Goal: Plan of Care Review  Outcome: Outcome(s) achieved Date Met: 02/07/19  Patient being discharged in stable condition.  Verbalized understanding of discharge instructions, follow up appointments and medications.  Prednisone and azithro being delivered to bedside from pharmacy. All questions answered.   coming to  patient. IV removed, visi and I pad removed from room.

## 2019-02-07 NOTE — PROGRESS NOTES
Home Oxygen Evaluation    Date Performed: 2019 @1710    1) Patient's Home O2 Sat on room air, while at rest:93%       If O2 sats on room air at rest are 88% or below, patient qualifies. No additional testing needed. Document N/A in steps 2 and 3. If 89% or above, complete steps 2.      2) Patient's O2 Sat on room air while exercisin%       If O2 sats on room air while exercising remain 89% or above patient does not qualify, no further testing needed Document N/A in step 3. If O2 sats on room air while exercising are 88% or below, continue to step 3.      3) Patient's O2 Sat while exercising on O2:  92% at 2 LPM         (Must show improvement from #2 for patients to qualify)    If O2 sats improve on oxygen, patient qualifies for portable oxygen. If not, the patient does not qualify.

## 2019-02-08 ENCOUNTER — TELEPHONE (OUTPATIENT)
Dept: PULMONOLOGY | Facility: CLINIC | Age: 59
End: 2019-02-08

## 2019-02-08 DIAGNOSIS — Z12.39 BREAST CANCER SCREENING: ICD-10-CM

## 2019-02-08 NOTE — TELEPHONE ENCOUNTER
Informed patient that currently Dr. Park does not have any appointments until 04/03 and that I have scheduled her an follow up appointment on 04/03 at 10:30 and also placed her on the waitlist in case there are any cancellations. Patient agreed to appointment and verbalized that she understood. An appointment letter will be placed in the mail for patient.

## 2019-02-11 ENCOUNTER — PATIENT OUTREACH (OUTPATIENT)
Dept: ADMINISTRATIVE | Facility: CLINIC | Age: 59
End: 2019-02-11

## 2019-02-11 NOTE — PATIENT INSTRUCTIONS
Discharge Instructions: COPD  You have been diagnosed with chronic obstructive pulmonary disease (COPD). This is a name given to a group of diseases that limit the flow of air in and out of your lungs. This makes it harder to breathe. With COPD, you are also more likely to get lung infections. COPD includes chronic bronchitis and emphysema. COPD is most often caused by heavy, long-term cigarette smoking.  Home care  Quit smoking  · If you smoke, quit. It is the best thing you can do for your COPD and your overall health.  · Join a stop-smoking program. There are even telephone, text message, and Internet programs to help you quit.  · Ask your healthcare provider about medicines or other methods to help you quit.  · Ask family members to quit smoking as well.  · Don't allow people to smoke in your home, in your car, or when they are around you.  Protect yourself from infection  · Wash your hands often. Do your best to keep your hands away from your face. Most germs are spread from your hands to your mouth.  · Get a flu shot every year. Also ask your provider about pneumonia vaccines.  · Avoid crowds. It's especially important to do this in the winter when more people have colds and flu.  · To stay healthy, get enough sleep, exercise regularly, and eat a balanced diet. You should:  ¨ Get about 8 hours of sleep every night.  ¨ Try to exercise for at least 30 minutes on most days.  ¨ Have healthy foods including fruits and vegetables, 100% whole grains, lean meats and fish, and low-fat dairy products. Try to stay away from foods high in fats and sugar.  Take your medicines  Take your medicines exactly as directed. Don't skip doses.  Manage your stress  Stress can make COPD worse. Use this stress management technique:  · Find a quiet place and sit or lie in a comfortable position.  · Close your eyes and perform breathing exercises for several minutes. Ask your provider about the best way to breathe.  Pulmonary  rehabilitation  · Pulmonary rehab can help you feel better. These programs include exercise, breathing techniques, information about COPD, counseling, and help for smokers.  · Ask your provider or your local hospital about programs in your area.  When to call your healthcare provider  Call your provider immediately if you have any of the following:  · Shortness of breath, wheezing, or coughing  · Increased mucus  · Yellow, green, bloody, or smelly mucus  · Fever or chills  · Tightness in your chest that does not go away with rest or medicine  · An irregular heartbeat or a feeling that your heart is beating very fast  · Swollen ankles   Date Last Reviewed: 5/1/2016  © 5433-0921 [employer]. [address] [place] [address], [place] [address]. All rights reserved. This information is not intended as a substitute for professional medical care. Always follow your healthcare professional's instructions.

## 2019-02-11 NOTE — PLAN OF CARE
02/07/19 1500   Final Note   Assessment Type Final Discharge Note   Anticipated Discharge Disposition Home   What phone number can be called within the next 1-3 days to see how you are doing after discharge? ([phone])   Hospital Follow Up  Appt(s) scheduled? Yes   Discharge plans and expectations educations in teach back method with documentation complete? Yes   Right Care Referral Info   Post Acute Recommendation No Care

## 2019-02-12 DIAGNOSIS — J44.1 CHRONIC OBSTRUCTIVE PULMONARY DISEASE WITH ACUTE EXACERBATION: ICD-10-CM

## 2019-02-12 RX ORDER — ALBUTEROL SULFATE 0.83 MG/ML
2.5 SOLUTION RESPIRATORY (INHALATION) EVERY 6 HOURS PRN
Qty: 50 BOX | Refills: 2 | Status: CANCELLED | OUTPATIENT
Start: 2019-02-12 | End: 2020-02-12

## 2019-02-12 NOTE — TELEPHONE ENCOUNTER
Spoke with patient's pharmacy, Ms Kelly has already picked up this prescription. We may have received a fax because the refill was sent to them twice. Disregard this request.

## 2019-02-15 ENCOUNTER — OFFICE VISIT (OUTPATIENT)
Dept: INTERNAL MEDICINE | Facility: CLINIC | Age: 59
End: 2019-02-15
Payer: COMMERCIAL

## 2019-02-15 VITALS
OXYGEN SATURATION: 92 % | SYSTOLIC BLOOD PRESSURE: 122 MMHG | WEIGHT: 157.44 LBS | HEIGHT: 63 IN | BODY MASS INDEX: 27.89 KG/M2 | HEART RATE: 83 BPM | DIASTOLIC BLOOD PRESSURE: 78 MMHG

## 2019-02-15 DIAGNOSIS — Z72.0 TOBACCO ABUSE DISORDER: ICD-10-CM

## 2019-02-15 DIAGNOSIS — J44.1 COPD EXACERBATION: ICD-10-CM

## 2019-02-15 DIAGNOSIS — J44.1 CHRONIC OBSTRUCTIVE PULMONARY DISEASE WITH ACUTE EXACERBATION: Primary | ICD-10-CM

## 2019-02-15 PROCEDURE — 99213 OFFICE O/P EST LOW 20 MIN: CPT | Mod: S$GLB,,, | Performed by: NURSE PRACTITIONER

## 2019-02-15 PROCEDURE — 99999 PR PBB SHADOW E&M-EST. PATIENT-LVL III: CPT | Mod: PBBFAC,,, | Performed by: NURSE PRACTITIONER

## 2019-02-15 PROCEDURE — 99999 PR PBB SHADOW E&M-EST. PATIENT-LVL III: ICD-10-PCS | Mod: PBBFAC,,, | Performed by: NURSE PRACTITIONER

## 2019-02-15 PROCEDURE — 99213 PR OFFICE/OUTPT VISIT, EST, LEVL III, 20-29 MIN: ICD-10-PCS | Mod: S$GLB,,, | Performed by: NURSE PRACTITIONER

## 2019-02-15 NOTE — PROGRESS NOTES
INTERNAL MEDICINE PROGRESS NOTE    CHIEF COMPLAINT     Chief Complaint   Patient presents with    Hospital Follow Up       HPI     Cassia Kelly is a 59 y.o. female with COPD, cervical cancer, and tobacco use who presents for a hospital follow up visit today.    Here following hospital d/c 2/7 for COPD. Was admitted through the ED 2/4/19 with s/s of hypoxia. 4 days of worsening SOB. Recently changed from advair to trelegy. Completed course of doxy several weeks ago wit interval improvement. Since 2/1 increase in white sputum and cough. Pulse ox in the mid 80's at home.     Hospital Course:   02/05/2019 Pt on oxygen, desaturates to 88% off oxygen. Pt progressing, decreased cough.   02/06/2019 Patient saturating at 89-91% without oxygen. SPO2 decreased to 87% while walking. Plan to d/c with home oxygen for use with activity.  02/07/2019 Pt saturating >90% at rest, but desaturates with activity to 84%. Will discharge with azithromycin.      COPD- needs f/u with pulm. (4/3/19)   Continues trekegy daily and albuterol nebs and inh as needed.   Home O2   Completed azithromycin     Reports at home since discharge O2 at rest 92% without sup O2. 89-90% at home when ambulating without O2.   Reports improved cough. No wheezing. No increase in mucous.   Completed azithromycin.   No fever or chills.     Past Medical History:  Past Medical History:   Diagnosis Date    Abnormal Pap smear 1987    [facility]    Acute cystitis 1/24/2018    Adenomatous polyp of colon 11/23/2015    Repeat c-scope 5 yrs     Cervical cancer     COPD (chronic obstructive pulmonary disease)     Microscopic hematuria 8/13/2013    Smoker 11/23/2015    STD (sexually transmitted disease)     Ureteral calculus, left 9/6/2013       Home Medications:  Prior to Admission medications    Medication Sig Start Date End Date Taking? Authorizing Provider   albuterol (PROVENTIL) 2.5 mg /3 mL (0.083 %) nebulizer solution Take 3 mLs (2.5 mg total) by nebulization  "every 6 (six) hours as needed for Wheezing. 1/14/19 1/14/20  Julián Sy, HARRISON   albuterol (PROVENTIL/VENTOLIN HFA) 90 mcg/actuation inhaler Inhale 2 puffs into the lungs every 4 (four) hours as needed for Wheezing or Shortness of Breath. 12/4/18   Erna Park MD   fluticasone-umeclidin-vilanter (TRELEGY ELLIPTA) 100-62.5-25 mcg DsDv Inhale 1 puff into the lungs once daily. 12/4/18   Erna Park MD   naproxen sodium (ALEVE ORAL) Take 1 tablet by mouth daily as needed (headache).    Historical Provider, MD       Review of Systems:  Review of Systems   Constitutional: Negative for chills, diaphoresis, fatigue and fever.   Respiratory: Positive for shortness of breath. Negative for cough, chest tightness and wheezing.    Cardiovascular: Negative for chest pain and palpitations.   Gastrointestinal: Negative for abdominal pain, constipation, diarrhea, nausea and vomiting.   Skin: Negative for rash.   Neurological: Positive for light-headedness (when O2 drops ). Negative for dizziness.       Health Maintainence:   Immunizations:  Health Maintenance       Date Due Completion Date    TETANUS VACCINE 08/14/2013 8/14/2003    Pneumococcal Vaccine (Highest Risk) (2 of 3 - PCV13) 11/23/2016 11/23/2015    Zoster Vaccine 01/15/2018 ---    Mammogram 01/30/2018 1/30/2017    Influenza Vaccine 08/01/2018 11/24/2017    Override on 12/5/2016: Done    Colonoscopy 07/30/2020 7/30/2015    Lipid Panel 01/25/2023 1/25/2018           PHYSICAL EXAM     /78 (BP Location: Left arm, Patient Position: Sitting, BP Method: Large (Manual))   Pulse 83   Ht 5' 3" (1.6 m)   Wt 71.4 kg (157 lb 6.5 oz)   LMP  (LMP Unknown)   SpO2 (!) 92%   BMI 27.88 kg/m²     Physical Exam   Constitutional: She is oriented to person, place, and time. She appears well-developed and well-nourished.   HENT:   Head: Normocephalic.   Right Ear: External ear normal.   Left Ear: External ear normal.   Nose: Nose normal.   Mouth/Throat: Oropharynx is clear " and moist. No oropharyngeal exudate.   Eyes: Pupils are equal, round, and reactive to light.   Neck: Neck supple. No JVD present. No tracheal deviation present. No thyromegaly present.   Cardiovascular: Normal rate, regular rhythm, normal heart sounds and intact distal pulses. Exam reveals no gallop and no friction rub.   No murmur heard.  Pulmonary/Chest: Effort normal and breath sounds normal. No stridor. No respiratory distress. She has no wheezes. She has no rales. She exhibits no tenderness.   Abdominal: Soft. Bowel sounds are normal. She exhibits no distension. There is no tenderness.   Musculoskeletal: Normal range of motion. She exhibits no edema or tenderness.   Lymphadenopathy:     She has no cervical adenopathy.   Neurological: She is alert and oriented to person, place, and time.   Skin: Skin is warm and dry. Capillary refill takes less than 2 seconds. No rash noted.   Psychiatric: She has a normal mood and affect. Her behavior is normal.   Vitals reviewed.       LABS     Lab Results   Component Value Date    HGBA1C 5.2 01/24/2018     CMP  Sodium   Date Value Ref Range Status   02/07/2019 140 136 - 145 mmol/L Final     Potassium   Date Value Ref Range Status   02/07/2019 3.9 3.5 - 5.1 mmol/L Final     Chloride   Date Value Ref Range Status   02/07/2019 105 95 - 110 mmol/L Final     CO2   Date Value Ref Range Status   02/07/2019 26 23 - 29 mmol/L Final     Glucose   Date Value Ref Range Status   02/07/2019 92 70 - 110 mg/dL Final     BUN, Bld   Date Value Ref Range Status   02/07/2019 19 8 - 23 mg/dL Final     Creatinine   Date Value Ref Range Status   02/07/2019 0.6 0.5 - 1.4 mg/dL Final     Calcium   Date Value Ref Range Status   02/07/2019 9.0 8.7 - 10.5 mg/dL Final     Total Protein   Date Value Ref Range Status   02/07/2019 6.3 6.0 - 8.4 g/dL Final     Albumin   Date Value Ref Range Status   02/07/2019 3.4 (L) 3.5 - 5.2 g/dL Final     Total Bilirubin   Date Value Ref Range Status   02/07/2019 0.4 0.1  - 1.0 mg/dL Final     Comment:     For infants and newborns, interpretation of results should be based  on gestational age, weight and in agreement with clinical  observations.  Premature Infant recommended reference ranges:  Up to 24 hours.............<8.0 mg/dL  Up to 48 hours............<12.0 mg/dL  3-5 days..................<15.0 mg/dL  6-29 days.................<15.0 mg/dL       Alkaline Phosphatase   Date Value Ref Range Status   02/07/2019 104 55 - 135 U/L Final     AST   Date Value Ref Range Status   02/07/2019 8 (L) 10 - 40 U/L Final     ALT   Date Value Ref Range Status   02/07/2019 13 10 - 44 U/L Final     Anion Gap   Date Value Ref Range Status   02/07/2019 9 8 - 16 mmol/L Final     eGFR if    Date Value Ref Range Status   02/07/2019 >60.0 >60 mL/min/1.73 m^2 Final     eGFR if non    Date Value Ref Range Status   02/07/2019 >60.0 >60 mL/min/1.73 m^2 Final     Comment:     Calculation used to obtain the estimated glomerular filtration  rate (eGFR) is the CKD-EPI equation.        Lab Results   Component Value Date    WBC 11.68 02/07/2019    HGB 13.2 02/07/2019    HCT 40.2 02/07/2019    MCV 96 02/07/2019     02/07/2019     Lab Results   Component Value Date    CHOL 167 01/25/2018    CHOL 207 (H) 01/27/2017    CHOL 206 (H) 03/30/2015     Lab Results   Component Value Date    HDL 36 (L) 01/25/2018    HDL 48 01/27/2017    HDL 42 03/30/2015     Lab Results   Component Value Date    LDLCALC 107.0 01/25/2018    LDLCALC 136.8 01/27/2017    LDLCALC 133.4 03/30/2015     Lab Results   Component Value Date    TRIG 120 01/25/2018    TRIG 111 01/27/2017    TRIG 153 (H) 03/30/2015     Lab Results   Component Value Date    CHOLHDL 21.6 01/25/2018    CHOLHDL 23.2 01/27/2017    CHOLHDL 20.4 03/30/2015     Lab Results   Component Value Date    TSH 0.695 03/30/2015       ASSESSMENT/PLAN     aCssia Morelosn is a 59 y.o. female with  Past Medical History:   Diagnosis Date    [facility]  [facility] smear 1987    [facility]    Acute cystitis 1/24/2018    Adenomatous polyp of colon 11/23/2015    Repeat c-scope 5 yrs     Cervical cancer     COPD (chronic obstructive pulmonary disease)     Microscopic hematuria 8/13/2013    Smoker 11/23/2015    STD (sexually transmitted disease)     Ureteral calculus, left 9/6/2013     Chronic obstructive pulmonary disease with acute exacerbation- resolving. Will cont trelegy. May use albuterol as needed. In clinic O2 sat 95 at rest. 6 minute walking test- O2 sat 90-95%. May use home O2 with activity. F/u with Pulm as directed.      COPD exacerbation    Tobacco abuse disorder- discussed smoking cessation. Will consider smoking cessation program.     Follow up with PCP in May for annual or before as needed     Patient education provided from Denis. Patient was counseled on when and how to seek emergent care.       Martha EMANUEL, MAMIE, FNP-c   Department of Internal Medicine - [facility]  12:14 PM

## 2019-02-15 NOTE — LETTER
February 15, 2019      [facility] - Internal Medicine  [address]  [facility] [address]  Phone: [phone]  Fax: [phone]       Patient: Cassia Kelly   YOB: 1960  Date of Visit: 02/15/2019    To Whom It May Concern:    Shanelle Kelly  was at [facility] on 02/15/2019. She may return to work on Monday February 18, 2019 with no restrictions. If you have any questions or concerns, or if I can be of further assistance, please do not hesitate to contact me.    Sincerely,    Martha Nuno NP

## 2019-04-23 ENCOUNTER — OFFICE VISIT (OUTPATIENT)
Dept: INTERNAL MEDICINE | Facility: CLINIC | Age: 59
End: 2019-04-23
Payer: COMMERCIAL

## 2019-04-23 ENCOUNTER — NURSE TRIAGE (OUTPATIENT)
Dept: ADMINISTRATIVE | Facility: CLINIC | Age: 59
End: 2019-04-23

## 2019-04-23 VITALS
SYSTOLIC BLOOD PRESSURE: 128 MMHG | OXYGEN SATURATION: 93 % | WEIGHT: 160.06 LBS | TEMPERATURE: 98 F | HEART RATE: 77 BPM | BODY MASS INDEX: 28.36 KG/M2 | DIASTOLIC BLOOD PRESSURE: 72 MMHG | HEIGHT: 63 IN

## 2019-04-23 DIAGNOSIS — J44.9 CHRONIC OBSTRUCTIVE PULMONARY DISEASE, UNSPECIFIED COPD TYPE: Primary | ICD-10-CM

## 2019-04-23 DIAGNOSIS — Z72.0 TOBACCO USE: ICD-10-CM

## 2019-04-23 PROCEDURE — 99999 PR PBB SHADOW E&M-EST. PATIENT-LVL V: ICD-10-PCS | Mod: PBBFAC,,, | Performed by: NURSE PRACTITIONER

## 2019-04-23 PROCEDURE — 94640 AIRWAY INHALATION TREATMENT: CPT | Mod: 59,S$GLB,, | Performed by: NURSE PRACTITIONER

## 2019-04-23 PROCEDURE — 99214 OFFICE O/P EST MOD 30 MIN: CPT | Mod: 25,S$GLB,, | Performed by: NURSE PRACTITIONER

## 2019-04-23 PROCEDURE — 94760 N-INVAS EAR/PLS OXIMETRY 1: CPT | Mod: S$GLB,,, | Performed by: NURSE PRACTITIONER

## 2019-04-23 PROCEDURE — 94640 PR INHAL RX, AIRWAY OBST/DX SPUTUM INDUCT: ICD-10-PCS | Mod: 59,S$GLB,, | Performed by: NURSE PRACTITIONER

## 2019-04-23 PROCEDURE — 94760 PR NONINVASV OXYGEN SATUR, SINGLE: ICD-10-PCS | Mod: S$GLB,,, | Performed by: NURSE PRACTITIONER

## 2019-04-23 PROCEDURE — 3008F PR BODY MASS INDEX (BMI) DOCUMENTED: ICD-10-PCS | Mod: CPTII,S$GLB,, | Performed by: NURSE PRACTITIONER

## 2019-04-23 PROCEDURE — 99214 PR OFFICE/OUTPT VISIT, EST, LEVL IV, 30-39 MIN: ICD-10-PCS | Mod: 25,S$GLB,, | Performed by: NURSE PRACTITIONER

## 2019-04-23 PROCEDURE — 3008F BODY MASS INDEX DOCD: CPT | Mod: CPTII,S$GLB,, | Performed by: NURSE PRACTITIONER

## 2019-04-23 PROCEDURE — 99999 PR PBB SHADOW E&M-EST. PATIENT-LVL V: CPT | Mod: PBBFAC,,, | Performed by: NURSE PRACTITIONER

## 2019-04-23 RX ORDER — IPRATROPIUM BROMIDE 0.5 MG/2.5ML
0.5 SOLUTION RESPIRATORY (INHALATION) ONCE
Status: COMPLETED | OUTPATIENT
Start: 2019-04-23 | End: 2019-04-23

## 2019-04-23 RX ORDER — IPRATROPIUM BROMIDE 0.5 MG/2.5ML
500 SOLUTION RESPIRATORY (INHALATION) 4 TIMES DAILY
Qty: 1 BOX | Refills: 0 | Status: SHIPPED | OUTPATIENT
Start: 2019-04-23 | End: 2020-04-22

## 2019-04-23 RX ORDER — PREDNISONE 20 MG/1
TABLET ORAL
Qty: 14 TABLET | Refills: 0 | Status: SHIPPED | OUTPATIENT
Start: 2019-04-23 | End: 2019-07-11 | Stop reason: SDUPTHER

## 2019-04-23 RX ADMIN — IPRATROPIUM BROMIDE 0.5 MG: 0.5 SOLUTION RESPIRATORY (INHALATION) at 12:04

## 2019-04-23 NOTE — MEDICAL/APP STUDENT
Subjective:       Patient ID: Cassia Kelly is a 59 y.o. female.    Chief Complaint: Shortness of Breath    I have reviewed the notes, assessments, and/or procedures performed by CLAIR student Marisol Mayo, I concur with her/his documentation of Cassia Kelly.    Walking decreases 02 to 90%  Exertion with doing laundry patient is having more shortness of breath  Patient reports started with bad hacking cough on Thursday, reports some mucus but not much  Having to use albuterol nebulizer a few times each day for the last few days  Reports Saturday had swollen allergy eyes  Patient started using a [employer] brand nose spray for allergies and an unknown sinus pill from [employer]  No fever, fatigue, nausea or vomiting     Review of Systems   Constitutional: Positive for activity change. Negative for chills, diaphoresis, fatigue and fever.   HENT: Positive for congestion, postnasal drip and rhinorrhea. Negative for sneezing and sore throat.    Respiratory: Positive for cough, chest tightness, shortness of breath and wheezing.    Cardiovascular: Negative for chest pain, palpitations and leg swelling.   Gastrointestinal: Negative for diarrhea, nausea and vomiting.   Musculoskeletal: Positive for back pain. Negative for joint swelling and neck stiffness.        Back pain with coughing   Skin: Negative for color change, pallor, rash and wound.   Allergic/Immunologic: Positive for environmental allergies.   Neurological: Negative for dizziness, weakness, light-headedness and headaches.   Psychiatric/Behavioral: Negative for sleep disturbance. The patient is not nervous/anxious.        Objective:      Physical Exam   Constitutional: She is oriented to person, place, and time. She appears well-developed and well-nourished. No distress.   HENT:   Head: Normocephalic and atraumatic.   Right Ear: External ear normal.   Left Ear: External ear normal.   Nose: Nose normal.   Mouth/Throat: Uvula is midline, oropharynx is clear and  moist and mucous membranes are normal. No oropharyngeal exudate.   Post nasal drip   Neck: Normal range of motion. Neck supple.   Cardiovascular: Normal rate, regular rhythm, normal heart sounds and intact distal pulses.   No murmur heard.  Pulmonary/Chest: Effort normal. She has decreased breath sounds in the right middle field, the right lower field, the left middle field and the left lower field. She has wheezes.   Reactive cough   Abdominal: Soft.   Musculoskeletal: Normal range of motion. She exhibits no edema, tenderness or deformity.   Neurological: She is alert and oriented to person, place, and time.   Skin: Skin is warm and dry. Capillary refill takes less than 2 seconds. No rash noted. She is not diaphoretic. No erythema. No pallor.   Psychiatric: She has a normal mood and affect. Her speech is normal and behavior is normal. Judgment and thought content normal. Cognition and memory are normal.   Nursing note and vitals reviewed.      Post Atrovent breathing treatment - more air movement throughout lungs    Assessment:       Cassia Antonio was seen today for shortness of breath.    Diagnoses and all orders for this visit:    Chronic obstructive pulmonary disease, unspecified COPD type  -     Ambulatory referral to Smoking Cessation Program  -     Ambulatory Referral to Pulmonology  -     ipratropium 0.02 % nebulizer solution 0.5 mg  -     ipratropium (ATROVENT) 0.02 % nebulizer solution; Take 2.5 mLs (500 mcg total) by nebulization 4 (four) times daily. Rescue  -     predniSONE (DELTASONE) 20 MG tablet; Take 2 tablets once a day for 7 days with food    Tobacco use  -     Ambulatory referral to Smoking Cessation Program  -     Ambulatory Referral to Pulmonology      Plan:       Patient Instructions   Use the Atrovent in your nebulizer machine every 6 hours    Prednisone with food for the next 7 days    Follow up with Pulmonary    Follow up with Smoking Cessation    To ER for any worsening

## 2019-04-23 NOTE — PATIENT INSTRUCTIONS
Use the Atrovent in your nebulizer machine every 6 hours    Prednisone with food for the next 7 days    Follow up with Pulmonary    Follow up with Smoking Cessation    To ER for any worsening

## 2019-04-29 ENCOUNTER — TELEPHONE (OUTPATIENT)
Dept: INTERNAL MEDICINE | Facility: CLINIC | Age: 59
End: 2019-04-29

## 2019-04-29 DIAGNOSIS — J44.1 CHRONIC OBSTRUCTIVE PULMONARY DISEASE WITH ACUTE EXACERBATION: Primary | ICD-10-CM

## 2019-04-29 RX ORDER — PREDNISONE 20 MG/1
20 TABLET ORAL DAILY
Qty: 10 TABLET | Refills: 0 | Status: SHIPPED | OUTPATIENT
Start: 2019-04-29 | End: 2019-05-09

## 2019-04-29 RX ORDER — AZITHROMYCIN 250 MG/1
TABLET, FILM COATED ORAL
Qty: 6 TABLET | Refills: 0 | Status: SHIPPED | OUTPATIENT
Start: 2019-04-29 | End: 2019-05-04

## 2019-04-29 NOTE — TELEPHONE ENCOUNTER
----- Message from Victoria Lynch sent at 4/29/2019  7:19 AM CDT -----  Contact: Pt  Pt says she need to get an antibiotic prescription since she not getting better and is coughing up mucus    Pt can be reached at [phone]

## 2019-04-29 NOTE — TELEPHONE ENCOUNTER
Patient saw UC 4/23/19   She was instructed to use Atrovent/prednisone fu with pulmonary. . She would like an antib called in.

## 2019-04-29 NOTE — TELEPHONE ENCOUNTER
----- Message from Joby Chiu sent at 4/29/2019 12:27 PM CDT -----  Contact: Patient -[phone]  RX request - refill or new RX.  Is this a refill or new RX:  New  RX name and strength: Antibiotic      Pharmacy name and phone # [id number] - [place] - [address] AT [facility] [address] [phone] (Phone) [phone] (Fax    Comments:  Stating is coughing up a lot of mucus and is needing Rx sent to local pharmacy. Patient -[phone]    Please call an advise  Thank you

## 2019-04-29 NOTE — TELEPHONE ENCOUNTER
Message   Received: Today    Pt Advice   Message Contents   Vilma Strickland Staff   Caller: self/[phone] (Today,  1:46 PM)             Patient called in regards needing to inform Dr Rondon medical assistant, no fever, scratchy throat, oxygen is running about 90, 91. Yellow mucus. Today is the last day taken prednisone. please call and advise. Thank you.

## 2019-05-17 ENCOUNTER — TELEPHONE (OUTPATIENT)
Dept: DERMATOLOGY | Facility: CLINIC | Age: 59
End: 2019-05-17

## 2019-05-17 ENCOUNTER — OFFICE VISIT (OUTPATIENT)
Dept: INTERNAL MEDICINE | Facility: CLINIC | Age: 59
End: 2019-05-17
Payer: COMMERCIAL

## 2019-05-17 VITALS
WEIGHT: 156.5 LBS | SYSTOLIC BLOOD PRESSURE: 130 MMHG | BODY MASS INDEX: 27.73 KG/M2 | HEIGHT: 63 IN | OXYGEN SATURATION: 97 % | DIASTOLIC BLOOD PRESSURE: 80 MMHG | HEART RATE: 82 BPM

## 2019-05-17 DIAGNOSIS — L98.9 LESION OF SKIN OF CHEEK: ICD-10-CM

## 2019-05-17 DIAGNOSIS — Z23 VACCINE FOR STREPTOCOCCUS PNEUMONIAE AND INFLUENZA: ICD-10-CM

## 2019-05-17 DIAGNOSIS — J44.1 CHRONIC OBSTRUCTIVE PULMONARY DISEASE WITH ACUTE EXACERBATION: Primary | ICD-10-CM

## 2019-05-17 PROCEDURE — 3008F BODY MASS INDEX DOCD: CPT | Mod: CPTII,S$GLB,, | Performed by: INTERNAL MEDICINE

## 2019-05-17 PROCEDURE — 99214 OFFICE O/P EST MOD 30 MIN: CPT | Mod: S$GLB,,, | Performed by: INTERNAL MEDICINE

## 2019-05-17 PROCEDURE — 99214 PR OFFICE/OUTPT VISIT, EST, LEVL IV, 30-39 MIN: ICD-10-PCS | Mod: S$GLB,,, | Performed by: INTERNAL MEDICINE

## 2019-05-17 PROCEDURE — 3008F PR BODY MASS INDEX (BMI) DOCUMENTED: ICD-10-PCS | Mod: CPTII,S$GLB,, | Performed by: INTERNAL MEDICINE

## 2019-05-17 PROCEDURE — 99999 PR PBB SHADOW E&M-EST. PATIENT-LVL IV: CPT | Mod: PBBFAC,,, | Performed by: INTERNAL MEDICINE

## 2019-05-17 PROCEDURE — 99999 PR PBB SHADOW E&M-EST. PATIENT-LVL IV: ICD-10-PCS | Mod: PBBFAC,,, | Performed by: INTERNAL MEDICINE

## 2019-05-17 NOTE — TELEPHONE ENCOUNTER
----- Message from Dolly Dodd sent at 5/17/2019  3:26 PM CDT -----  Contact: patient  [phone]-please call above patient at either number being referred over waiting on a call from the office thanks

## 2019-05-21 NOTE — PROGRESS NOTES
Subjective:       Patient ID: Cassia Kelly is a 59 y.o. female.    Chief Complaint: Follow-up    Shortness of Breath   This is a chronic problem. The problem occurs intermittently. The problem has been unchanged. Pertinent negatives include no abdominal pain, chest pain, hemoptysis, rash, sore throat or wheezing. The symptoms are aggravated by smoke. She has tried steroid inhalers, ipratropium inhalers and oral steroids for the symptoms. The treatment provided moderate relief. Her past medical history is significant for COPD.   Considering application for disability.      Review of Systems   Constitutional: Negative for fatigue.   HENT: Negative for sore throat.    Eyes: Negative for visual disturbance.   Respiratory: Positive for shortness of breath. Negative for hemoptysis and wheezing.    Cardiovascular: Negative for chest pain and palpitations.   Gastrointestinal: Negative for abdominal pain.   Genitourinary: Negative for dysuria, frequency and hematuria.   Musculoskeletal: Negative for joint swelling.   Skin: Negative for rash.   Neurological: Negative for speech difficulty and weakness.   Psychiatric/Behavioral: Negative for dysphoric mood.       Objective:      Physical Exam   Constitutional: She is oriented to person, place, and time. She appears well-developed and well-nourished. No distress.   HENT:   Head: Normocephalic and atraumatic.   Mouth/Throat: Oropharynx is clear and moist.   Eyes: Pupils are equal, round, and reactive to light. Conjunctivae are normal.   Neck: Normal range of motion. Neck supple.   Cardiovascular: Normal rate, regular rhythm and normal heart sounds.   Pulmonary/Chest: Effort normal and breath sounds normal. She has no wheezes.   Abdominal: Soft. Bowel sounds are normal. There is no tenderness.   Musculoskeletal: Normal range of motion. She exhibits no edema or tenderness.   Neurological: She is alert and oriented to person, place, and time. No cranial nerve deficit.    Skin: No erythema.   Psychiatric: She has a normal mood and affect.   Vitals reviewed.      Assessment:       1. Chronic obstructive pulmonary disease with acute exacerbation    2. Lesion of skin of cheek    3. Vaccine for streptococcus pneumoniae and influenza        Plan:       Cassia Antonio was seen today for follow-up.    Diagnoses and all orders for this visit:    Chronic obstructive pulmonary disease with acute exacerbation  Comments:  discussed smoking cessation  Orders:  -     Ambulatory referral to Pulmonology    Lesion of skin of cheek  -     Ambulatory consult to Dermatology    Vaccine for streptococcus pneumoniae and influenza  -     (In Office Administered) Pneumococcal Conjugate Vaccine (13 Valent) (IM)        Follow up in about 6 months (around 11/17/2019) for F/U APPOINTMENT WITH ME.

## 2019-06-08 NOTE — TELEPHONE ENCOUNTER
Patient:   REGGIE FONG            MRN: [id number]            FIN: [id number]              Age:   68 years     Sex:  FEMALE     :  47   Associated Diagnoses:   None   Author:   JAMES BHATT     History of Present Illness   S: Signout was received from ED for Obs admission angioedema chronic and dysphagia acute.  I saw pt post EGD and upon arrival to [facility]. She went from ER to EGD.  No family at bedside to confirm story.  Chart reviewed at length from last admission 2018.  Pt reports to me 2 days of not eating. She lives w/ grand-daughter. She states she was not able to get ahold of her grand-daughter, pt reports she feels she is \"being a pest,\" so she called 911 due to her symptoms. She states hard for her to assess but feels like her throat is swelling. She is regurgitating food for last week. She thinks she has lost 15lbs over last two weeks. If charting accurate, her weight has been stable since 2018. She does state her clothes do not fit will. She states urinating and having BM without problem. She has had emesis. She states no fevers, no chills. She is unsure if her tongue is always protruding like this and this big. She has not followed up with allergy and immunology as far as I can tell from her history nor Dr. Lloyd. She is drowsy after the EGD but able to be aroused and answer questions and interact. She states she does not have  any pain anywhere. She feels the food get stuck at the level of her neck.    O: vitals are stable  She has coarse breath sound throughout, no stridor, no wheezing, no rash on her body, no hives, her tonue is protruding significantly, her bottom lip is enlarged making it difficult for her to express her speech, cognition is intact. She is able to tell me she is at [facility], she is able to tell some details of her story, she does not have drooling, she appears comfortable  A/P  Pt will be better served inpatient. Full admit through inpatient team   ----- Message from Dolly Vences sent at 2/7/2018  5:23 PM CST -----  Contact: Pt can be reached at [phone]  Pt is calling to ask for a script for yeast. Pt was on antibiotics and it left her with yeast.    Please contact pt    [facility] phone [phone]      Thankyou    1   DIscussed w/ Dr. Stallings, attending  I am unable to assess how much of the lip swelling is chronic v. acute component. She still feels \"throat swelling and tightness\" post EGD. We will treat w/ IV solumedrol 60 once. Hold on IV benadryl as pt is drowsy post EGD.  Pt reports takes \"BP med,\" unclear if she still takes ACE?? Family needs to be contacted for thorough history  EGD report reviewed, w/ food stuch in esophagus  Strict NPO until formal swallow eval  IVF running  will get 1 view xray as coarse breath sounds, she is likely aspirating her food contents  There is component of dementia. She needs full work up and family needs to be contacted for full history and timeline of angioedema and dysphagia since last admission 11/2018  Signout out provided to Dr. Welch, PGY3, inpatient team.    Krystal Soares MD  Internal Medicine PGY3  Phone:

## 2019-07-11 ENCOUNTER — OFFICE VISIT (OUTPATIENT)
Dept: INTERNAL MEDICINE | Facility: CLINIC | Age: 59
End: 2019-07-11
Payer: COMMERCIAL

## 2019-07-11 ENCOUNTER — NURSE TRIAGE (OUTPATIENT)
Dept: ADMINISTRATIVE | Facility: CLINIC | Age: 59
End: 2019-07-11

## 2019-07-11 ENCOUNTER — HOSPITAL ENCOUNTER (OUTPATIENT)
Dept: RADIOLOGY | Facility: HOSPITAL | Age: 59
Discharge: HOME | End: 2019-07-11
Attending: PHYSICIAN ASSISTANT
Payer: COMMERCIAL

## 2019-07-11 VITALS
BODY MASS INDEX: 28.59 KG/M2 | WEIGHT: 161.38 LBS | SYSTOLIC BLOOD PRESSURE: 120 MMHG | OXYGEN SATURATION: 94 % | DIASTOLIC BLOOD PRESSURE: 82 MMHG | HEIGHT: 63 IN | HEART RATE: 95 BPM

## 2019-07-11 DIAGNOSIS — J44.1 CHRONIC OBSTRUCTIVE PULMONARY DISEASE WITH ACUTE EXACERBATION: Primary | ICD-10-CM

## 2019-07-11 DIAGNOSIS — J44.1 CHRONIC OBSTRUCTIVE PULMONARY DISEASE WITH ACUTE EXACERBATION: ICD-10-CM

## 2019-07-11 DIAGNOSIS — Z72.0 TOBACCO USE: ICD-10-CM

## 2019-07-11 PROCEDURE — 71046 X-RAY EXAM CHEST 2 VIEWS: CPT | Mod: 26,,, | Performed by: RADIOLOGY

## 2019-07-11 PROCEDURE — 99999 PR PBB SHADOW E&M-EST. PATIENT-LVL IV: ICD-10-PCS | Mod: PBBFAC,,, | Performed by: PHYSICIAN ASSISTANT

## 2019-07-11 PROCEDURE — 71046 XR CHEST PA AND LATERAL: ICD-10-PCS | Mod: 26,,, | Performed by: RADIOLOGY

## 2019-07-11 PROCEDURE — 71046 X-RAY EXAM CHEST 2 VIEWS: CPT | Mod: TC

## 2019-07-11 PROCEDURE — 99214 PR OFFICE/OUTPT VISIT, EST, LEVL IV, 30-39 MIN: ICD-10-PCS | Mod: S$GLB,,, | Performed by: PHYSICIAN ASSISTANT

## 2019-07-11 PROCEDURE — 3008F PR BODY MASS INDEX (BMI) DOCUMENTED: ICD-10-PCS | Mod: CPTII,S$GLB,, | Performed by: PHYSICIAN ASSISTANT

## 2019-07-11 PROCEDURE — 99999 PR PBB SHADOW E&M-EST. PATIENT-LVL IV: CPT | Mod: PBBFAC,,, | Performed by: PHYSICIAN ASSISTANT

## 2019-07-11 PROCEDURE — 99214 OFFICE O/P EST MOD 30 MIN: CPT | Mod: S$GLB,,, | Performed by: PHYSICIAN ASSISTANT

## 2019-07-11 PROCEDURE — 3008F BODY MASS INDEX DOCD: CPT | Mod: CPTII,S$GLB,, | Performed by: PHYSICIAN ASSISTANT

## 2019-07-11 RX ORDER — NICOTINE 21-14-7MG
1 KIT TRANSDERMAL DAILY
Qty: 56 EACH | COMMUNITY
Start: 2019-07-11

## 2019-07-11 RX ORDER — ALBUTEROL SULFATE 0.83 MG/ML
2.5 SOLUTION RESPIRATORY (INHALATION) EVERY 6 HOURS PRN
Qty: 50 BOX | Refills: 2 | Status: SHIPPED | OUTPATIENT
Start: 2019-07-11 | End: 2019-12-10 | Stop reason: SDUPTHER

## 2019-07-11 RX ORDER — ALBUTEROL SULFATE 90 UG/1
2 AEROSOL, METERED RESPIRATORY (INHALATION) EVERY 4 HOURS PRN
Qty: 1 INHALER | Refills: 3 | Status: SHIPPED | OUTPATIENT
Start: 2019-07-11

## 2019-07-11 RX ORDER — AZITHROMYCIN 250 MG/1
TABLET, FILM COATED ORAL
Qty: 6 TABLET | Refills: 0 | Status: SHIPPED | OUTPATIENT
Start: 2019-07-11 | End: 2019-07-16

## 2019-07-11 RX ORDER — PREDNISONE 20 MG/1
TABLET ORAL
Qty: 14 TABLET | Refills: 0 | Status: SHIPPED | OUTPATIENT
Start: 2019-07-11 | End: 2019-12-10 | Stop reason: ALTCHOICE

## 2019-07-11 NOTE — PROGRESS NOTES
"Subjective:       Patient ID: Cassia Kelly is a 59 y.o. female.    Chief Complaint: Shortness of Breath; Cough; and Chest Congestion    HPI     COPD: Patient complains of dyspnea, cough and colored sputum. Symptoms began 4 days ago. Symptoms chronic dyspnea and cough productive of yellow sputum in moderate amounts  No Fever. Has used albuterol a few times. Compliant with Trilegy Inhlaer.  Patient currently is not on home oxygen therapy. Followed by Pulm but in need of follow up. Of note +sick contacts,  and grandchild with ?pneumonia about 1 week ago.    Still smoking typically 0.5ppd.    Review of patient's allergies indicates:   Allergen Reactions    Penicillins Rash     Past Medical History:   Diagnosis Date    Abnormal Pap smear 1987    Hy    Acute cystitis 1/24/2018    Adenomatous polyp of colon 11/23/2015    Repeat c-scope 5 yrs     Cervical cancer     COPD (chronic obstructive pulmonary disease)     Microscopic hematuria 8/13/2013    Smoker 11/23/2015    STD (sexually transmitted disease)     Ureteral calculus, left 9/6/2013     Social History     Tobacco Use    Smoking status: Current Some Day Smoker     Packs/day: 1.00     Types: Cigarettes    Smokeless tobacco: Never Used   Substance Use Topics    Alcohol use: No    Drug use: Not on file         Review of Systems   Constitutional: Negative for chills, diaphoresis, fever and unexpected weight change.   HENT: Positive for congestion.    Eyes: Negative for visual disturbance.   Respiratory: Positive for cough, shortness of breath and wheezing.    Cardiovascular: Negative for chest pain and leg swelling.   Gastrointestinal: Negative for abdominal pain, nausea and vomiting.   Skin: Negative for rash.   Neurological: Negative for weakness, light-headedness and headaches.       Objective: /82   Pulse 95   Ht 5' 3" (1.6 m)   Wt 73.2 kg (161 lb 6 oz)   LMP  (LMP Unknown)   SpO2 (!) 94%   BMI 28.59 kg/m²         Physical " Exam   Constitutional: She appears well-developed and well-nourished. No distress.   Talking comfortable in complete sentences   HENT:   Head: Normocephalic and atraumatic.   Mouth/Throat: Oropharynx is clear and moist.   Cardiovascular: Normal rate and regular rhythm. Exam reveals no friction rub.   No murmur heard.  Pulmonary/Chest: Effort normal. No respiratory distress. She has wheezes. She has no rales.   Abdominal: Soft. Bowel sounds are normal. There is no tenderness.   Neurological: She is alert.   Skin: Skin is warm and dry. Capillary refill takes less than 2 seconds. No rash noted.   Psychiatric: She has a normal mood and affect.   Vitals reviewed.      Assessment:       1. Chronic obstructive pulmonary disease with acute exacerbation    2. Tobacco use        Plan:         Cassia Antonio was seen today for shortness of breath, cough and chest congestion.    Diagnoses and all orders for this visit:    Chronic obstructive pulmonary disease with acute exacerbation  -     azithromycin (Z-PALMA) 250 MG tablet; Take 2 tablets by mouth on day 1; Take 1 tablet by mouth on days 2-5  -     predniSONE (DELTASONE) 20 MG tablet; Take 2 tablets once a day for 7 days with food  -     X-Ray Chest PA And Lateral; Future  -     albuterol (PROVENTIL) 2.5 mg /3 mL (0.083 %) nebulizer solution; Take 3 mLs (2.5 mg total) by nebulization every 6 (six) hours as needed for Wheezing.  -     albuterol (PROVENTIL/VENTOLIN HFA) 90 mcg/actuation inhaler; Inhale 2 puffs into the lungs every 4 (four) hours as needed for Wheezing or Shortness of Breath.    Tobacco use       - Declines assistance with cessation       - Wants to retry nicotine  -     nicotine 21-14-7 mg/24 hr PTDS; Place 1 each onto the skin once daily.      Ewa Garcia PA-C

## 2019-07-11 NOTE — TELEPHONE ENCOUNTER
Patient states that she is SOB; hx COPD. RA O2 is 93% resting, pulse 102 (while walking around grocery store). Productive cough- yellow mucus. Denies weakness, chest pain. Patient states that she is just a little more SOB than usual. Patient said the soonest she could get to the doctor's office was 2:30; appointment scheduled for 2:30. Patient verbalized understanding.     Reason for Disposition   MILD difficulty breathing (e.g., minimal/no SOB at rest, SOB with walking, pulse < 100) of new onset or worse than normal    Protocols used: BREATHING DIFFICULTY-A-OH

## 2019-07-17 ENCOUNTER — TELEPHONE (OUTPATIENT)
Dept: INTERNAL MEDICINE | Facility: CLINIC | Age: 59
End: 2019-07-17

## 2019-07-17 DIAGNOSIS — J44.1 CHRONIC OBSTRUCTIVE PULMONARY DISEASE WITH ACUTE EXACERBATION: Primary | ICD-10-CM

## 2019-07-17 NOTE — TELEPHONE ENCOUNTER
----- Message from Jaylyn Kelsey sent at 7/17/2019  1:09 PM CDT -----  Contact: self/[phone]  Pt called in regards to finishing her Rx for     azithromycin (Z-PALMA) 250 MG tablet 6 tablet 0 7/11/2019 7/16/2019 Take 2 tablets by mouth on day 1; Take 1 tablet by mouth on days 2-5    And    prednisone (DELTASONE) 20 MG tablet 14 tablet 0 7/11/2019  No  Sig: Take 2 tablets once a day for 7 days with food    And still not feeling any better. She would like the PA to call her in something else. She saw the pt on last Thursday.      Please advise

## 2019-07-17 NOTE — TELEPHONE ENCOUNTER
Spoke with patient. Still with mild to mod intermittent RATLIFF (no worsening, no fevers, no cp) and O2 in 92% at home. Feels better using her  oxygen from time to time.     I discussed with patient need for follow up in Pulmonolgy clinic and need for home oxygen eval.     I have placed orders.     Strict ED precautions discussed.     Ewa Garcia PA-C

## 2019-09-18 ENCOUNTER — OFFICE VISIT (OUTPATIENT)
Dept: DERMATOLOGY | Facility: CLINIC | Age: 59
End: 2019-09-18
Payer: COMMERCIAL

## 2019-09-18 DIAGNOSIS — D22.9 MULTIPLE BENIGN NEVI: ICD-10-CM

## 2019-09-18 DIAGNOSIS — L82.1 SEBORRHEIC KERATOSES: ICD-10-CM

## 2019-09-18 DIAGNOSIS — Z12.83 SCREENING EXAM FOR SKIN CANCER: ICD-10-CM

## 2019-09-18 DIAGNOSIS — B07.9 VIRAL WARTS, UNSPECIFIED TYPE: ICD-10-CM

## 2019-09-18 DIAGNOSIS — D48.5 NEOPLASM OF UNCERTAIN BEHAVIOR OF SKIN: Primary | ICD-10-CM

## 2019-09-18 PROCEDURE — 88305 TISSUE SPECIMEN TO PATHOLOGY, DERMATOLOGY: ICD-10-PCS | Mod: 26,,, | Performed by: PATHOLOGY

## 2019-09-18 PROCEDURE — 99203 OFFICE O/P NEW LOW 30 MIN: CPT | Mod: 25,S$GLB,, | Performed by: DERMATOLOGY

## 2019-09-18 PROCEDURE — 88305 TISSUE EXAM BY PATHOLOGIST: CPT | Performed by: PATHOLOGY

## 2019-09-18 PROCEDURE — 99203 PR OFFICE/OUTPT VISIT, NEW, LEVL III, 30-44 MIN: ICD-10-PCS | Mod: 25,S$GLB,, | Performed by: DERMATOLOGY

## 2019-09-18 PROCEDURE — 11104 PUNCH BX SKIN SINGLE LESION: CPT | Mod: S$GLB,,, | Performed by: DERMATOLOGY

## 2019-09-18 PROCEDURE — 99999 PR PBB SHADOW E&M-EST. PATIENT-LVL II: CPT | Mod: PBBFAC,,, | Performed by: DERMATOLOGY

## 2019-09-18 PROCEDURE — 17110 DESTRUCTION B9 LES UP TO 14: CPT | Mod: 59,S$GLB,, | Performed by: DERMATOLOGY

## 2019-09-18 PROCEDURE — 99999 PR PBB SHADOW E&M-EST. PATIENT-LVL II: ICD-10-PCS | Mod: PBBFAC,,, | Performed by: DERMATOLOGY

## 2019-09-18 PROCEDURE — 17110 PR DESTRUCTION BENIGN LESIONS UP TO 14: ICD-10-PCS | Mod: 59,S$GLB,, | Performed by: DERMATOLOGY

## 2019-09-18 PROCEDURE — 11104 PR PUNCH BIOPSY, SKIN, SINGLE LESION: ICD-10-PCS | Mod: S$GLB,,, | Performed by: DERMATOLOGY

## 2019-09-18 NOTE — PROGRESS NOTES
"  Subjective:       Patient ID:  Cassia Kelly is a 59 y.o. female who presents for   Chief Complaint   Patient presents with    Spot     spot on my left upper cheek x yrs. I squeese it and stuff comes out of it.    Spot     spot on my lower groin area x months.    Spot     spot on my right leg x yrs. It sometimes seem redder at times.     HPI  58 yo female here for TBSE.  Most concerned about a crusty spot near the eye. She also asks about a new growth over past 1-2 years that is pink and sometimes "stuff" comes out of it.    The patient denies any moles or growths of the skin that are rapidly growing, hurting, itching, bleeding, or changing colors.  She has a growth in the vaginal area she would like checked.  Review of Systems   Skin: Positive for daily sunscreen use and wears hat. Negative for itching and rash.   Hematologic/Lymphatic: Does not bruise/bleed easily.        Objective:    Physical Exam   Constitutional: She appears well-developed and well-nourished. No distress.   Genitourinary:         Neurological: She is alert and oriented to person, place, and time. She is not disoriented.   Psychiatric: She has a normal mood and affect.   Skin:   Areas Examined (abnormalities noted in diagram):   Scalp / Hair Palpated and Inspected  Head / Face Inspection Performed  Neck Inspection Performed  Chest / Axilla Inspection Performed  Abdomen Inspection Performed  Genitals / Buttocks / Groin Inspection Performed  Back Inspection Performed  RUE Inspected  LUE Inspection Performed  RLE Inspected  LLE Inspection Performed  Nails and Digits Inspection Performed                   Diagram Legend     Erythematous scaling macule/papule c/w actinic keratosis       Vascular papule c/w angioma      Pigmented verrucoid papule/plaque c/w seborrheic keratosis      Yellow umbilicated papule c/w sebaceous hyperplasia      Irregularly shaped tan macule c/w lentigo     1-2 mm smooth white papules consistent with Milia      " Movable subcutaneous cyst with punctum c/w epidermal inclusion cyst      Subcutaneous movable cyst c/w pilar cyst      Firm pink to brown papule c/w dermatofibroma      Pedunculated fleshy papule(s) c/w skin tag(s)      Evenly pigmented macule c/w junctional nevus     Mildly variegated pigmented, slightly irregular-bordered macule c/w mildly atypical nevus      Flesh colored to evenly pigmented papule c/w intradermal nevus       Pink pearly papule/plaque c/w basal cell carcinoma      Erythematous hyperkeratotic cursted plaque c/w SCC      Surgical scar with no sign of skin cancer recurrence      Open and closed comedones      Inflammatory papules and pustules      Verrucoid papule consistent consistent with wart     Erythematous eczematous patches and plaques     Dystrophic onycholytic nail with subungual debris c/w onychomycosis     Umbilicated papule    Erythematous-base heme-crusted tan verrucoid plaque consistent with inflamed seborrheic keratosis     Erythematous Silvery Scaling Plaque c/w Psoriasis     See annotation          Assessment / Plan:      Pathology Orders:     Normal Orders This Visit    Tissue Specimen To Pathology, Dermatology     Questions:    Directional Terms:  Other(comment)    Clinical Information:  firm pink yellow bump r/o cyst vs BCC/SCC 4 mm    Specific Site:  left cheek        Neoplasm of uncertain behavior of skin  -     Tissue Specimen To Pathology, Dermatology  R/o cyst vs NMSC    Punch biopsy procedure note:  Punch biopsy performed after verbal consent obtained. Area marked and prepped with alcohol. Approximately 1cc of 1% lidocaine with epinephrine injected. 3 mm disposable punch used to remove lesion. Hemostasis obtained and biopsy site closed with 1 - 2 Prolene sutures. Wound care instructions reviewed with patient and handout given.    Screening exam for skin cancer    Total body skin examination performed today including at least 12 points as noted in physical examination.  Suspicious lesions noted.    Multiple benign nevi    Seborrheic keratoses  These are benign inherited growths without a malignant potential. Reassurance given to patient. No treatment is necessary.     Viral warts, unspecified type - exophytic 2 mm white papule with red base  Vulva  LN2 today - if does not resolve f/u with gyn for removal    Cryosurgery procedure note:    Verbal consent from the patient is obtained including, but not limited to, risk of hypopigmentation/hyperpigmentation, scar, recurrence of lesion. Liquid nitrogen cryosurgery is applied to 1 lesions to produce a freeze injury. The patient is aware that blisters may form and is instructed on wound care with gentle cleansing and use of vaseline ointment to keep moist until healed. The patient is supplied a handout on cryosurgery and is instructed to call if lesions do not completely resolve.             Follow up if symptoms worsen or fail to improve.

## 2019-09-25 ENCOUNTER — CLINICAL SUPPORT (OUTPATIENT)
Dept: DERMATOLOGY | Facility: CLINIC | Age: 59
End: 2019-09-25
Payer: COMMERCIAL

## 2019-09-25 DIAGNOSIS — Z48.02 VISIT FOR SUTURE REMOVAL: Primary | ICD-10-CM

## 2019-09-25 PROCEDURE — 99024 PR POST-OP FOLLOW-UP VISIT: ICD-10-PCS | Mod: S$GLB,,, | Performed by: DERMATOLOGY

## 2019-09-25 PROCEDURE — 99024 POSTOP FOLLOW-UP VISIT: CPT | Mod: S$GLB,,, | Performed by: DERMATOLOGY

## 2019-09-25 PROCEDURE — 99999 PR PBB SHADOW E&M-EST. PATIENT-LVL II: CPT | Mod: PBBFAC,,,

## 2019-09-25 PROCEDURE — 99999 PR PBB SHADOW E&M-EST. PATIENT-LVL II: ICD-10-PCS | Mod: PBBFAC,,,

## 2019-09-25 NOTE — PROGRESS NOTES
Suture Removal note:  CC: 59 y.o. female patient is here for suture removal.         HPI: Patient is s/p punch biopsy of r/o cyst vs BCC/SCC 4 mm from the left cheek on 9/18/2019.  Patient reports no problems.    WOUND PE:  Sutures intact.  Wound healing well.  Good approximation of skin edges.  No signs or symptoms of infection.    IMPRESSION:  PENDING PATHOLOGY.    PLAN:  Sutures removed today.  Continue wound care.    RTC: pending pathology.

## 2019-10-04 ENCOUNTER — TELEPHONE (OUTPATIENT)
Dept: DERMATOLOGY | Facility: CLINIC | Age: 59
End: 2019-10-04

## 2019-10-07 ENCOUNTER — TELEPHONE (OUTPATIENT)
Dept: DERMATOLOGY | Facility: CLINIC | Age: 59
End: 2019-10-07

## 2019-10-07 NOTE — TELEPHONE ENCOUNTER
Returned pt call. No answer. Left message. Calling to give pt her results.    ----- Message from Serina Small MA sent at 10/4/2019  4:31 PM CDT -----  Contact: pt       ----- Message -----  From: Ines Iraheta  Sent: 10/4/2019   4:25 PM CDT  To: Rodney Arrington    Cool - pt Pt is returning the nurses phone call can you please call pt at [phone]    TAY

## 2019-10-07 NOTE — TELEPHONE ENCOUNTER
Called patient to tell her we had a cancellation for tomorrow 10/8/19 for 11:30   lw    ----- Message from Ines Iraheta sent at 10/7/2019  3:39 PM CDT -----  Contact: pt   Cool - pt is returning the nurses phone call about biopsy results can you please call pt at [phone] jc

## 2019-10-08 ENCOUNTER — TELEPHONE (OUTPATIENT)
Dept: DERMATOLOGY | Facility: CLINIC | Age: 59
End: 2019-10-08

## 2019-10-08 NOTE — TELEPHONE ENCOUNTER
Spoke with pt, gave result.    ----- Message from Charla Chapman MA sent at 10/7/2019  4:38 PM CDT -----  Contact: pt       ----- Message -----  From: Ines Iraheta  Sent: 10/7/2019   4:24 PM CDT  To: Rodney Arrington    Cool - pt is returning the nurses phone call in ref to her biopsy results can you please call pt at [phone]    TAY

## 2019-10-19 ENCOUNTER — NURSE TRIAGE (OUTPATIENT)
Dept: ADMINISTRATIVE | Facility: CLINIC | Age: 59
End: 2019-10-19

## 2019-10-19 NOTE — TELEPHONE ENCOUNTER
"  Reason for Disposition   [1] Continuous coughing keeps from working AND [2] no improvement using cough treatment per protocol    Additional Information   Negative: Severe difficulty breathing (e.g., struggling for each breath, speaks in single words)   Negative: [1] Lips or face are bluish now AND [2] persists when not coughing   Negative: Sounds like a life-threatening emergency to the triager   Negative: Chest pain is main symptom   Negative: [1] Dry (non-productive) cough AND [2] < 3 weeks duration     (i.e., no sputum or minimal clear sputum)   Negative: [1] Wet (productive) cough AND [2] < 3 weeks duration     (i.e., white-yellow, yellow, green, or gisele colored sputum)   Negative: [1] Previous asthma attacks AND [2] this feels like asthma attack   Negative: Chest pain  (Exception: MILD central chest pain, present only when coughing)   Negative: Difficulty breathing  (Exception: no change from usual, chronic shortness of breath)   Negative: [1] Increasing difficulty breathing AND [2] always has some difficulty breathing   Negative: Patient sounds very sick or weak to the triager   Negative: [1] Coughed up blood AND [2] > 1 tablespoon (15 ml) (Exception: blood-tinged sputum)   Negative: Fever > 103 F (39.4 C)   Negative: [1] Fever > 101 F (38.3 C) AND [2] age > 60   Negative: [1] Fever > 101 F (38.3 C) AND [2] bedridden (e.g., nursing home patient, CVA, chronic illness, recovering from surgery)   Negative: [1] Fever > 100.5 F (38.1 C) AND [2] diabetes mellitus or weak immune system (e.g., HIV positive, cancer chemo, splenectomy, organ transplant, chronic steroids)   Negative: SEVERE coughing spells (e.g., whooping sound after coughing, vomiting after coughing)    Answer Assessment - Initial Assessment Questions  1. ONSET: "When did the cough begin?"      thurs 10/17  2. SEVERITY: "How bad is the cough today?"      Bothering pt at work   3. RESPIRATORY DISTRESS: "Describe your breathing."      " "Afeb, SOB with exertoin   4. FEVER: "Do you have a fever?" If so, ask: "What is your temperature, how was it measured, and when did it start?"     afeb   5. SPUTUM: "Describe the color of your sputum" (e.g., clear, white, yellow, green), "Has there been any change recently?"     Yellowing   6. HEMOPTYSIS: "Are you coughing up any blood?" If so ask: "How much, flecks, streaks, tablespoons, etc.?"     Denies   7. CARDIAC HISTORY: "Do you have any history of heart disease?" (e.g., heart attack, congestive heart failure)      Denies   8. LUNG HISTORY: "Do you have any history of lung disease?"  (e.g., pulmonary embolus, asthma, emphysema/COPD)     COPD   9. OTHER SYMPTOMS: "Do you have any other symptoms? (e.g., runny nose, wheezing, chest pain)   soreness in mid back   10. PREGNANCY: "Is there any chance you are pregnant?" "When was your last menstrual period?"      N/a   11. TRAVEL: "Have you traveled out of the country in the last month?" (e.g., travel history, exposures)       No    Protocols used: ST COUGH - CHRONIC-A-AH  Beginning COPD flare up - coughing up colored phlegm since thurs. more SOB with exertion. using nebs past couple days.   Maura Yates dr spoke with dr duy dozier  today. Pt notified. Daryl back with questions.   "

## 2019-11-22 NOTE — TELEPHONE ENCOUNTER
----- Message from Sheri Segundo sent at 11/22/2019 12:35 PM CST -----  Contact: [phone]  Type: Rx    Name of medication(s):  Medication for copd     Is this a refill? New rx? Refill      Who prescribed medication?    Pharmacy Name, Phone, & Location: [id number]  [place], [place] - [address] [phone]      Comments: please call and advise, Thanks

## 2019-12-10 ENCOUNTER — OFFICE VISIT (OUTPATIENT)
Dept: PULMONOLOGY | Facility: CLINIC | Age: 59
End: 2019-12-10
Payer: COMMERCIAL

## 2019-12-10 ENCOUNTER — PATIENT OUTREACH (OUTPATIENT)
Dept: ADMINISTRATIVE | Facility: OTHER | Age: 59
End: 2019-12-10

## 2019-12-10 VITALS
HEIGHT: 63 IN | WEIGHT: 162.06 LBS | HEART RATE: 94 BPM | DIASTOLIC BLOOD PRESSURE: 79 MMHG | OXYGEN SATURATION: 95 % | BODY MASS INDEX: 28.71 KG/M2 | SYSTOLIC BLOOD PRESSURE: 130 MMHG

## 2019-12-10 DIAGNOSIS — F17.210 CIGARETTE NICOTINE DEPENDENCE WITHOUT COMPLICATION: ICD-10-CM

## 2019-12-10 DIAGNOSIS — Z12.9 SCREENING FOR CANCER: ICD-10-CM

## 2019-12-10 DIAGNOSIS — J44.1 COPD WITH ACUTE EXACERBATION: ICD-10-CM

## 2019-12-10 DIAGNOSIS — J44.9 CHRONIC OBSTRUCTIVE PULMONARY DISEASE, UNSPECIFIED COPD TYPE: Primary | ICD-10-CM

## 2019-12-10 PROCEDURE — 99214 PR OFFICE/OUTPT VISIT, EST, LEVL IV, 30-39 MIN: ICD-10-PCS | Mod: 25,S$GLB,, | Performed by: NURSE PRACTITIONER

## 2019-12-10 PROCEDURE — 99999 PR PBB SHADOW E&M-EST. PATIENT-LVL III: CPT | Mod: PBBFAC,,, | Performed by: NURSE PRACTITIONER

## 2019-12-10 PROCEDURE — 99406 BEHAV CHNG SMOKING 3-10 MIN: CPT | Mod: 25,S$GLB,, | Performed by: NURSE PRACTITIONER

## 2019-12-10 PROCEDURE — 99999 PR PBB SHADOW E&M-EST. PATIENT-LVL III: ICD-10-PCS | Mod: PBBFAC,,, | Performed by: NURSE PRACTITIONER

## 2019-12-10 PROCEDURE — 90686 FLU VACCINE (QUAD) GREATER THAN OR EQUAL TO 3YO PRESERVATIVE FREE IM: ICD-10-PCS | Mod: S$GLB,,, | Performed by: NURSE PRACTITIONER

## 2019-12-10 PROCEDURE — 99406 PR TOBACCO USE CESSATION INTERMEDIATE 3-10 MINUTES: ICD-10-PCS | Mod: 25,S$GLB,, | Performed by: NURSE PRACTITIONER

## 2019-12-10 PROCEDURE — 90686 IIV4 VACC NO PRSV 0.5 ML IM: CPT | Mod: S$GLB,,, | Performed by: NURSE PRACTITIONER

## 2019-12-10 PROCEDURE — 99214 OFFICE O/P EST MOD 30 MIN: CPT | Mod: 25,S$GLB,, | Performed by: NURSE PRACTITIONER

## 2019-12-10 PROCEDURE — 90471 FLU VACCINE (QUAD) GREATER THAN OR EQUAL TO 3YO PRESERVATIVE FREE IM: ICD-10-PCS | Mod: S$GLB,,, | Performed by: NURSE PRACTITIONER

## 2019-12-10 PROCEDURE — 90471 IMMUNIZATION ADMIN: CPT | Mod: S$GLB,,, | Performed by: NURSE PRACTITIONER

## 2019-12-10 RX ORDER — AZITHROMYCIN 250 MG/1
TABLET, FILM COATED ORAL
Qty: 6 TABLET | Refills: 0 | Status: SHIPPED | OUTPATIENT
Start: 2019-12-10

## 2019-12-10 RX ORDER — ALBUTEROL SULFATE 0.83 MG/ML
2.5 SOLUTION RESPIRATORY (INHALATION) EVERY 6 HOURS PRN
Qty: 50 BOX | Refills: 2 | Status: SHIPPED | OUTPATIENT
Start: 2019-12-10 | End: 2020-08-18

## 2019-12-10 RX ORDER — PREDNISONE 20 MG/1
20 TABLET ORAL 2 TIMES DAILY
Qty: 10 TABLET | Refills: 0 | Status: SHIPPED | OUTPATIENT
Start: 2019-12-10 | End: 2019-12-15

## 2019-12-10 NOTE — LETTER
December 12, 2019      Obdulia Mann, FNP-C  [address]  [place] [address]           [facility]  [address]  [facility] [address]  Phone: [phone]          Patient: Cassia Kelly   MR Number: [id number]   YOB: 1960   Date of Visit: 12/10/2019       Dear Obdulia Mann:    Thank you for referring Cassia Kelly to me for evaluation. Attached you will find relevant portions of my assessment and plan of care.    If you have questions, please do not hesitate to call me. I look forward to following Cassia Kelly along with you.    Sincerely,    Liz Ames, NP    Enclosure  CC:  No Recipients    If you would like to receive this communication electronically, please contact [email or web address] or [phone] to request more information on [employer] access.    For providers and/or their staff who would like to refer a patient to [facility], please contact us through our one-stop-shop provider referral line, [facility] , at [phone].    If you feel you have received this communication in error or would no longer like to receive these types of communications, please e-mail [email or web address]

## 2019-12-10 NOTE — PROGRESS NOTES
Subjective:       Patient ID: Cassia Kelly is a 59 y.o. female.    Chief Complaint: COPD    HPI:   Cassia Kelly is a 59 y.o. female who presents for COPD evaluation. She last followed with Dr. Park (12/4/2018), but new to me. Pt with increased respiratory symptoms for the last 5 days.  These have included increased cough (productive of yellow mucus), congestion, increased SOB, RATLIFF and wheezing.  This has not responded to usual home therapies.  There is no fever, chills or sweats.  There is no chest pain or palpitations reported. Ms. Kelly feels a cold triggered her symptoms. 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 She explains usingTrelegy daily and needs refill. Has been using albuterol once a day. Prior to 5 days ago, she reports has not required albuterol, but requesting refill for prn use. Explains last exacerbation was in July 2019.    She reports history of asthma as a child.  Explains never hospitalized for asthma or COPD in the past.  She denies family history of lung disease.  She denies seasonal allergies or postnasal drip.  She denies unintentional weight loss, fever, chills, or hemoptysis.  Discloses having daily cough.  However has recently turned to productive yellow mucus-usually white.     Unfortunately,  explains she remains smoking.  Reports smoking 3/4 of a pack to 1 pack a day times 43 years.  Reports has tried nicotine replacement therapy and was unsuccessful in the past.  Tells she has not received her flu vaccine for this season.  Reports up-to-date on her pneumonia vaccine. She reports has not followed at pulmonary rehab.     Review of Systems   Constitutional: Negative for fever, chills, weight loss and night sweats.   HENT: Positive for congestion. Negative for postnasal drip and rhinorrhea.    Respiratory: Positive for cough, sputum production, shortness of breath, wheezing and dyspnea on extertion. Negative for chest tightness.     Cardiovascular: Negative for chest pain and leg swelling.   Gastrointestinal: Negative for acid reflux.   Neurological: Negative for dizziness and headaches.   Hematological: Negative for adenopathy.   Psychiatric/Behavioral: The patient is not nervous/anxious.        Social History     Tobacco Use    Smoking status: Current Some Day Smoker     Packs/day: 1.00     Types: Cigarettes    Smokeless tobacco: Never Used   Substance Use Topics    Alcohol use: No     Review of patient's allergies indicates:   Allergen Reactions    Penicillins Rash     Past Medical History:   Diagnosis Date    Abnormal Pap smear     Hyst    Acute cystitis 2018    Adenomatous polyp of colon 2015    Repeat c-scope 5 yrs     Cervical cancer     COPD (chronic obstructive pulmonary disease)     Microscopic hematuria 2013    Smoker 2015    STD (sexually transmitted disease)     Ureteral calculus, left 2013     Past Surgical History:   Procedure Laterality Date    APPENDECTOMY       SECTION      x1    HYSTERECTOMY      LAVERN/LSO (Abn Pap)    OOPHORECTOMY      Vocal chord nodules removed       Current Outpatient Medications on File Prior to Visit   Medication Sig    albuterol (PROVENTIL/VENTOLIN HFA) 90 mcg/actuation inhaler Inhale 2 puffs into the lungs every 4 (four) hours as needed for Wheezing or Shortness of Breath.    ipratropium (ATROVENT) 0.02 % nebulizer solution Take 2.5 mLs (500 mcg total) by nebulization 4 (four) times daily. Rescue    naproxen sodium (ALEVE ORAL) Take 1 tablet by mouth daily as needed (headache).    nicotine 21-14-7 mg/24 hr PTDS Place 1 each onto the skin once daily.     No current facility-administered medications on file prior to visit.      Objective:      Vitals:    12/10/19 1405 12/10/19 1407   BP:  130/79   BP Location:  Left arm   Patient Position:  Sitting   Pulse: 78 94   SpO2: (!) 93% 95%   Weight: 73.5 kg (162 lb 0.6 oz) 73.5 kg (162 lb 0.6 oz)  "  Height: 5' 3" (1.6 m)    Pateint walked distance of hallway pre o2 sats 93% and post o2sats 90%    Physical Exam   Constitutional: She is oriented to person, place, and time. She appears well-developed and well-nourished. No distress.   HENT:   Head: Normocephalic.   Neck: Normal range of motion. Neck supple.   Cardiovascular: Normal rate, regular rhythm and normal heart sounds.   Pulmonary/Chest: Normal expansion and effort normal. No respiratory distress. She has no wheezes. She has rhonchi.   Abdominal: Soft. Bowel sounds are normal.   Musculoskeletal: Normal range of motion. She exhibits no edema.   Lymphadenopathy: No supraclavicular adenopathy is present.     She has no cervical adenopathy.   Neurological: She is alert and oriented to person, place, and time. Gait normal.   Skin: Skin is warm and dry. Nails show no clubbing.   Psychiatric: She has a normal mood and affect. Her behavior is normal.   Vitals reviewed.    Personal Diagnostic Review    PFTs 12/4/2018: Ratio=50%, FEV1=54PPD  TLC=92PPD, No bronchodilator response, preserved DLCO    CXR 7/11/2019  Impression       Emphysema.  No acute cardiopulmonary disease identified.     Pulmonary stress- 12/4/2018  CLINICAL INTERPRETATION:  Six minute walk distance is 340.77 meters (1118 feet) with   moderate dyspnea.  During exercise, there was significant desaturation while   breathing room air.  Both blood pressure and heart rate remained stable with walking.  The patient did not report non-pulmonary symptoms during   exercise.  No previous study performed.  Based upon age and body mass index, exercise capacity is less   than predicted.    CT of chest 11/28/2018-  FINDINGS:  Additional history: PA chest radiograph dated 11/22/2018 revealed a 1.1 cm nodular opacity in the left mid lung field.  We find no corresponding abnormality on today's examination to explain these prior findings.    Base of neck: Soft tissue and vascular structures are " unremarkable.    Thoracic soft tissues: Normal.    Aorta: Left-sided three-vessel aortic arch with mild calcific atherosclerosis.  Thoracic aorta maintains appropriate caliber, contour, and course.    Pulmonary vasculature: Pulmonary arteries distribute appropriately.  There are 4 pulmonary veins.    Heart: No cardiac enlargement pericardial effusion.  There is mild coronary atherosclerosis.    Maritza/Mediastinum: There are scattered, calcified mediastinal and right hilar nodes.  No significant lymphadenopathy    Airways: Trachea is midline.  There is a small amount of layering secretions within the mid aspect of the trachea best appreciated on axial series 4, image 60.    Lungs/Pleura: Lungs are symmetrically expanded with moderate centrilobular emphysema.  There are scattered calcified granulomas bilaterally.  A solitary pulmonary nodule is again identified in the apical segment of the right upper lobe, unchanged in size from prior examination dated 01/24/2018 (axial series 4, image 69).    Esophagus: Normal.    Upper Abdomen: No abnormality of the partially imaged upper abdomen.    Bones: There is mild degenerative change throughout the thoracic spine.  T6 vertebral body demonstrates mild compression deformity.  No acute fracture or bone destruction.  Assessment and Plan:     Problem List Items Addressed This Visit        Pulmonary    COPD with acute exacerbation    Overview     Ratio=50%, FEV1=54PPD  TLC=92PPD, No bronchodilator response, preserved DLCO         Current Assessment & Plan     Has COPD. Acitve smoker. Reports increase in symptoms. 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 Reports last exacerbation in July.   Plan:  -Encouraged smoking cessation.   - Start Prednisone 40 mg times 5 days.  -Start Zpack  -Continue Trelegy daily  -Continue Albuterol as needed. Encouraged to use albuterol 4 times a day for the next 2 days.   -She will notify in 3-5 days if not having improvement.   -Discussed s/s that  "would warrant ER visitation.          Relevant Medications    predniSONE (DELTASONE) 20 MG tablet    azithromycin (ZITHROMAX Z-PALMA) 250 MG tablet    albuterol (PROVENTIL) 2.5 mg /3 mL (0.083 %) nebulizer solution    Chronic obstructive pulmonary disease - Primary    Current Assessment & Plan     Informal walk in hallway noted o2 sats 93%->90%. Discussed 6 MWT for possible oxygen supplementation - she declines at this time. Explains will not use oxygen if needed.   Discussed pulmonary rehab. - She declines at this time. She explains working full time and will not be able to attend.     Plan:  -Encouraged smoking cessation. She declines smoking cessation program.   -Continue Trelegy  -Continue albuterol as needed  -Needs Flu vaccine today  -Current smoker with > 30 pack year hx- needs annual LDCT. Patient agrees to LDCT screening.   -Follow up in 3-6  Months or sooner if needed               Relevant Medications    predniSONE (DELTASONE) 20 MG tablet    fluticasone-umeclidin-vilanter (TRELEGY ELLIPTA) 100-62.5-25 mcg DsDv    Other Relevant Orders    Influenza - Quadrivalent (PF) (Completed)       Oncology    Screening for cancer    Current Assessment & Plan     Low Dose Screening CT Chest    Cigarettes - 3/4-1 PPD x 43 YEARS  Still smoking - YES      Date of last LD CT -11/28/2018    -I have discussed with pt about using a screening CT of the chest due to history of cigarette smoking.  We have discussed the possible findings and possible actions as a result of these findings.  The pt would like to proceed.           Relevant Orders    CT Chest Without Contrast       Other    Nicotine dependence, uncomplicated    Overview     Cigarette Counseling    Currently smoking 3/4-1 packs per day  43 pack years    I have counseled pt for 3-5 minutes regarding cigarette cessation.  This has included the need to stop smoking as well as strategies, including but not limited to "cold turkey", CHANTIX (including risks and benefits of " maritza drug), nicotine replacement and WELLBUTRIN.    -She explains wanting to quit on her own and not interested in smoking cessation at this time.            Follow up in 3-6 months. She will notify if having symptoms worsen or fail to improve in 3-5 days.

## 2019-12-11 PROBLEM — Z12.9 SCREENING FOR CANCER: Status: ACTIVE | Noted: 2019-12-11

## 2019-12-11 PROBLEM — J44.9 CHRONIC OBSTRUCTIVE PULMONARY DISEASE: Status: ACTIVE | Noted: 2019-12-11

## 2019-12-11 PROBLEM — J44.1 COPD WITH ACUTE EXACERBATION: Status: ACTIVE | Noted: 2019-12-11

## 2019-12-11 NOTE — ASSESSMENT & PLAN NOTE
Informal walk in hallway noted o2 sats 93%->90%. Discussed 6 MWT for possible oxygen supplementation - she declines at this time. Explains will not use oxygen if needed.   Discussed pulmonary rehab. - She declines at this time. She explains working full time and will not be able to attend.     Plan:  -Encouraged smoking cessation. She declines smoking cessation program.   -Continue Trelegy  -Continue albuterol as needed  -Needs Flu vaccine today  -Current smoker with > 30 pack year hx- needs annual LDCT. Patient agrees to LDCT screening.   -Follow up in 3-6  Months or sooner if needed

## 2019-12-12 PROBLEM — J44.1 COPD EXACERBATION: Status: RESOLVED | Noted: 2018-01-24 | Resolved: 2019-12-12

## 2019-12-12 PROBLEM — F17.200 NICOTINE DEPENDENCE, UNCOMPLICATED: Status: ACTIVE | Noted: 2019-12-12

## 2019-12-12 NOTE — PATIENT INSTRUCTIONS
COPD Flare    You have had a flare-up of your COPD.  COPD, or chronic obstructive pulmonary disease, is a common lung disease. It causes your airways to become irritated and narrower. This makes it harder for you to breathe. Emphysema and chronic bronchitis are both types of COPD. This is a chronic condition, which means you always have it. Sometimes it gets worse. When this happens, it is called a flare-up.  Symptoms of COPD  People with COPD may have symptoms most of the time. In a flare-up, your symptoms get worse. These symptoms may mean you are having a flare-up:  · Shortness of breath, shallow or rapid breathing, or wheezing that gets worse  · Lung infection  · Cough that gets worse  · More mucus, thicker mucus or mucus of a different color  · Tiredness, decreased energy, or trouble doing your usual activities  · Fever  · Chest tightness  · Your symptoms dont get better even when you use your usual medicines, inhalers, and nebulizer  · Trouble talking  · You feel confused  Causes of flare-ups  Unfortunately, a flare-up can happen even though you did everything right, and you followed your doctors instructions. Some causes of flare-ups are:  · Smoking or secondhand smoke  · Colds, the flu, or respiratory infections  · Air pollution  · Sudden change in the weather  · Dust, irritating chemicals, or strong fumes  · Not taking your medicines as prescribed  Home care  Here are some things you can do at home to treat a flare-up:  · Try not to panic. This makes it harder to breathe, and keeps you from doing the right things.  · Dont smoke or be around others who are smoking.  · Try to drink more fluids than usual during a flare-up, unless your doctor has told you not to because of heart and kidney problems. More fluids can help loosen the mucus.  · Use your inhalers and nebulizer, if you have one, as you have been told to.  · If you were given antibiotics, take them until they are used up or your doctor tells you  to stop. Its important to finish the antibiotics, even though you feel better. This will make sure the infection has cleared.  · If you were given prednisone or another steroid, finish it even if you feel better.  Preventing a flare-up  Even though flare-ups happen, the best way to treat one is to prevent it before it starts. Here are some pointers:  · Dont smoke or be around others who are smoking.  · Take your medicines as you have been told.  · Talk with your doctor about getting a flu shot every year. Also find out if you need a pneumonia shot.  · If there is a weather advisory warning to stay indoors, try to stay inside when possible.  · Try to eat healthy and get plenty of sleep.  · Try to avoid things that usually set you off, like dust, chemical fumes, hairsprays, or strong perfumes.  Follow-up care  Follow up with your healthcare provider, or as advised.  If a culture was done, you will be told if your treatment needs to be changed. You can call as directed for the results.  If X-rays were done, you will be notified of any new findings that may affect your care.  Call 911  Call 911 if any of these occur:  · You have trouble breathing  · You feel confused or its difficult to wake you up  · You faint or lose consciousness  · You have a rapid heart rate  · You have new pain in your chest, arm, shoulder, neck or upper back  When to seek medical advice  Call your healthcare provider right away if any of these occur:  · Wheezing or shortness of breath gets worse  · You need to use your inhalers more often than usual without relief  · Fever of 100.4°F (38ºC) or higher, or as directed by your healthcare provider  · Coughing up lots of dark-colored or bloody mucus (sputum)  · Chest pain with each breath  · You do not start to get better within 24 hours  · Swelling of your ankles gets worse  · Dizziness or weakness  Date Last Reviewed: 9/1/2016  © 7179-7802 [employer]. [address] [facility],  [place] [address]. All rights reserved. This information is not intended as a substitute for professional medical care. Always follow your healthcare professional's instructions.

## 2019-12-12 NOTE — ASSESSMENT & PLAN NOTE
Low Dose Screening CT Chest    Cigarettes - 3/4-1 PPD x 43 YEARS  Still smoking - YES      Date of last LD CT -11/28/2018    -I have discussed with pt about using a screening CT of the chest due to history of cigarette smoking.  We have discussed the possible findings and possible actions as a result of these findings.  The pt would like to proceed.

## 2019-12-12 NOTE — ASSESSMENT & PLAN NOTE
Has COPD. Acitve smoker. Reports increase in symptoms. 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 Reports last exacerbation in July.   Plan:  -Encouraged smoking cessation.   - Start Prednisone 40 mg times 5 days.  -Start Zpack  -Continue Trelegy daily  -Continue Albuterol as needed. Encouraged to use albuterol 4 times a day for the next 2 days.   -She will notify in 3-5 days if not having improvement.   -Discussed s/s that would warrant ER visitation.

## 2020-01-10 ENCOUNTER — HOSPITAL ENCOUNTER (EMERGENCY)
Facility: HOSPITAL | Age: 60
Discharge: HOME | End: 2020-01-10
Attending: EMERGENCY MEDICINE
Payer: COMMERCIAL

## 2020-01-10 VITALS
HEART RATE: 76 BPM | OXYGEN SATURATION: 96 % | RESPIRATION RATE: 17 BRPM | SYSTOLIC BLOOD PRESSURE: 134 MMHG | TEMPERATURE: 98 F | DIASTOLIC BLOOD PRESSURE: 72 MMHG | WEIGHT: 160 LBS | HEIGHT: 63 IN | BODY MASS INDEX: 28.35 KG/M2

## 2020-01-10 DIAGNOSIS — R42 DIZZINESS: ICD-10-CM

## 2020-01-10 DIAGNOSIS — H53.8 BLURRED VISION: Primary | ICD-10-CM

## 2020-01-10 LAB
ANION GAP SERPL CALC-SCNC: 8 MMOL/L (ref 8–16)
BASOPHILS # BLD AUTO: 0.05 K/UL (ref 0–0.2)
BASOPHILS NFR BLD: 0.4 % (ref 0–1.9)
BUN SERPL-MCNC: 10 MG/DL (ref 8–23)
CALCIUM SERPL-MCNC: 10.2 MG/DL (ref 8.7–10.5)
CHLORIDE SERPL-SCNC: 107 MMOL/L (ref 95–110)
CO2 SERPL-SCNC: 28 MMOL/L (ref 23–29)
CREAT SERPL-MCNC: 0.7 MG/DL (ref 0.5–1.4)
DIFFERENTIAL METHOD: ABNORMAL
EOSINOPHIL # BLD AUTO: 0.2 K/UL (ref 0–0.5)
EOSINOPHIL NFR BLD: 1.8 % (ref 0–8)
ERYTHROCYTE [DISTWIDTH] IN BLOOD BY AUTOMATED COUNT: 13 % (ref 11.5–14.5)
EST. GFR  (AFRICAN AMERICAN): >60 ML/MIN/1.73 M^2
EST. GFR  (NON AFRICAN AMERICAN): >60 ML/MIN/1.73 M^2
GLUCOSE SERPL-MCNC: 101 MG/DL (ref 70–110)
HCT VFR BLD AUTO: 45 % (ref 37–48.5)
HGB BLD-MCNC: 14.5 G/DL (ref 12–16)
IMM GRANULOCYTES # BLD AUTO: 0.05 K/UL (ref 0–0.04)
IMM GRANULOCYTES NFR BLD AUTO: 0.4 % (ref 0–0.5)
LYMPHOCYTES # BLD AUTO: 2.9 K/UL (ref 1–4.8)
LYMPHOCYTES NFR BLD: 24.2 % (ref 18–48)
MCH RBC QN AUTO: 31 PG (ref 27–31)
MCHC RBC AUTO-ENTMCNC: 32.2 G/DL (ref 32–36)
MCV RBC AUTO: 96 FL (ref 82–98)
MONOCYTES # BLD AUTO: 0.7 K/UL (ref 0.3–1)
MONOCYTES NFR BLD: 5.7 % (ref 4–15)
NEUTROPHILS # BLD AUTO: 8 K/UL (ref 1.8–7.7)
NEUTROPHILS NFR BLD: 67.5 % (ref 38–73)
NRBC BLD-RTO: 0 /100 WBC
PLATELET # BLD AUTO: 217 K/UL (ref 150–350)
PMV BLD AUTO: 11.7 FL (ref 9.2–12.9)
POTASSIUM SERPL-SCNC: 3.8 MMOL/L (ref 3.5–5.1)
RBC # BLD AUTO: 4.67 M/UL (ref 4–5.4)
SODIUM SERPL-SCNC: 143 MMOL/L (ref 136–145)
WBC # BLD AUTO: 11.88 K/UL (ref 3.9–12.7)

## 2020-01-10 PROCEDURE — 85025 COMPLETE CBC W/AUTO DIFF WBC: CPT

## 2020-01-10 PROCEDURE — 99284 PR EMERGENCY DEPT VISIT,LEVEL IV: ICD-10-PCS | Mod: ,,, | Performed by: EMERGENCY MEDICINE

## 2020-01-10 PROCEDURE — 25000003 PHARM REV CODE 250: Performed by: EMERGENCY MEDICINE

## 2020-01-10 PROCEDURE — 99284 EMERGENCY DEPT VISIT MOD MDM: CPT | Mod: 25

## 2020-01-10 PROCEDURE — 99284 EMERGENCY DEPT VISIT MOD MDM: CPT | Mod: ,,, | Performed by: EMERGENCY MEDICINE

## 2020-01-10 PROCEDURE — 80048 BASIC METABOLIC PNL TOTAL CA: CPT

## 2020-01-10 RX ORDER — PROPARACAINE HYDROCHLORIDE 5 MG/ML
2 SOLUTION/ DROPS OPHTHALMIC
Status: COMPLETED | OUTPATIENT
Start: 2020-01-10 | End: 2020-01-10

## 2020-01-10 RX ORDER — ACETAMINOPHEN 500 MG
500 TABLET ORAL EVERY 6 HOURS PRN
COMMUNITY

## 2020-01-10 RX ADMIN — PROPARACAINE HYDROCHLORIDE 2 DROP: 5 SOLUTION/ DROPS OPHTHALMIC at 08:01

## 2020-01-10 RX ADMIN — FLUORESCEIN SODIUM 1 EACH: 1 STRIP OPHTHALMIC at 08:01

## 2020-01-11 NOTE — DISCHARGE INSTRUCTIONS
- your CT scan was normal today  - your labs were normal  - please follow up with Dr. Lainez for further evaluation

## 2020-01-11 NOTE — ED TRIAGE NOTES
Cassia Kelly, a 59 y.o. female presents to the ED w/ complaint of blurry/ unclear vision which lasted about 15 mins, while driving.   When patient covers left eye, patient able to see.    Currently patient can see clearly. Patient wears eyes glasses    Patient reports some dizziness at around 2 pm    Patient denies weakness, numbness and tingling, patient denies changes in speech        Triage note:  Chief Complaint   Patient presents with    Eye Problem     Pt reports blurriness when driving that started tonight. States when she covers the left eye she can see normally. Denies any other neuro symptoms.     Review of patient's allergies indicates:   Allergen Reactions    Penicillins Rash     Past Medical History:   Diagnosis Date    [facility] smear 1987    [facility]    Acute cystitis 1/24/2018    Adenomatous polyp of colon 11/23/2015    Repeat c-scope 5 yrs     Cervical cancer     COPD (chronic obstructive pulmonary disease)     Microscopic hematuria 8/13/2013    Smoker 11/23/2015    STD (sexually transmitted disease)     Ureteral calculus, left 9/6/2013

## 2020-01-11 NOTE — ED PROVIDER NOTES
"Encounter Date: 1/10/2020    SCRIBE #1 NOTE: I, Srinath Herring, am scribing for, and in the presence of,  Dr. Grewal. I have scribed the following portions of the note - Other sections scribed: HPI, ROS, PE, MDM.       History     Chief Complaint   Patient presents with    Eye Problem     Pt reports blurriness when driving that started tonight. States when she covers the left eye she can see normally. Denies any other neuro symptoms.     Patient is a 59 year old female presenting with an eye problem. Noticed that when she was driving she began to have some blurry vision. Describes that all the cars to one side would start to "creep in." When she covered her right eye, her vision would become normal. Upon arriving at her home, she decided to go to the store, but wasn't able to drive anymore. After switching out her contacts for her glasses, she still had no visual improvement. Denies any speech difficulty, nausea, vomiting, diarrhea, weakness,  loss of vision or eye pain. Medical problems include COPD. Her sister does have MS and her first sign was double vision.  Of note, pt reports dizziness while at work prior to eye symptoms starting.    Dizziness has currently resolved.       The history is provided by the patient and medical records.     Review of patient's allergies indicates:   Allergen Reactions    Penicillins Rash     Past Medical History:   Diagnosis Date    Abnormal Pap smear     Hyst    Acute cystitis 2018    Adenomatous polyp of colon 2015    Repeat c-scope 5 yrs     Cervical cancer     COPD (chronic obstructive pulmonary disease)     Microscopic hematuria 2013    Smoker 2015    STD (sexually transmitted disease)     Ureteral calculus, left 2013     Past Surgical History:   Procedure Laterality Date    APPENDECTOMY       SECTION      x1    HYSTERECTOMY      LAVERN/LSO (Abn Pap)    OOPHORECTOMY      Vocal chord nodules removed       Family History "   Problem Relation Age of Onset    Hypertension Father     Hypertension Mother     Heart disease Sister         arrhy    Heart disease Brother         MI    Cancer Paternal Aunt         colon ?    Melanoma Neg Hx     Psoriasis Neg Hx     Lupus Neg Hx      Social History     Tobacco Use    Smoking status: Current Some Day Smoker     Packs/day: 1.00     Types: Cigarettes    Smokeless tobacco: Never Used   Substance Use Topics    Alcohol use: No    Drug use: Not on file     Review of Systems   Constitutional: Negative for chills and fever.   HENT: Negative for sore throat and trouble swallowing.    Eyes: Positive for visual disturbance. Negative for pain.   Respiratory: Negative for cough and shortness of breath.    Cardiovascular: Negative for chest pain and palpitations.   Gastrointestinal: Negative for nausea and vomiting.   Genitourinary: Negative for dysuria and hematuria.   Musculoskeletal: Negative for back pain and neck pain.   Neurological: Negative for dizziness, speech difficulty, weakness, light-headedness and headaches.   Psychiatric/Behavioral: Negative for behavioral problems and confusion.       Physical Exam     Initial Vitals [01/10/20 1911]   BP Pulse Resp Temp SpO2   (!) 183/101 92 16 97.9 °F (36.6 °C) 95 %      MAP       --         Physical Exam    Nursing note and vitals reviewed.  HENT:   Head: Normocephalic and atraumatic.   Mouth/Throat: Oropharynx is clear and moist.   Eyes: Conjunctivae, EOM and lids are normal. Pupils are equal, round, and reactive to light. Lids are everted and swept, no foreign bodies found. Right eye exhibits no chemosis and no discharge. No foreign body present in the right eye. Left eye exhibits no chemosis and no discharge. No foreign body present in the left eye. No scleral icterus.   IOP 15, 14   Neck: Neck supple. No JVD present.   Cardiovascular: Normal rate, regular rhythm, normal heart sounds and intact distal pulses.   Pulmonary/Chest: Breath sounds  normal. No respiratory distress. She has no wheezes. She has no rhonchi. She has no rales.   Abdominal: Soft. She exhibits no distension. There is no tenderness.   Lymphadenopathy:     She has no cervical adenopathy.   Neurological: She is alert and oriented to person, place, and time. She has normal strength and normal reflexes. No cranial nerve deficit or sensory deficit. GCS score is 15. GCS eye subscore is 4. GCS verbal subscore is 5. GCS motor subscore is 6.   Gait intact.         ED Course   Procedures  Labs Reviewed   CBC W/ AUTO DIFFERENTIAL - Abnormal; Notable for the following components:       Result Value    Gran # (ANC) 8.0 (*)     Immature Grans (Abs) 0.05 (*)     All other components within normal limits   BASIC METABOLIC PANEL          Imaging Results          CT Head Without Contrast (Final result)  Result time 01/10/20 22:58:42    Final result by Jesse Anderson MD (01/10/20 22:58:42)                 Impression:      No CT evidence of acute intracranial abnormality.      Electronically signed by: Jesse Anderson MD  Date:    01/10/2020  Time:    22:58             Narrative:    EXAMINATION:  CT HEAD WITHOUT CONTRAST    CLINICAL HISTORY:  Diplopia;Dizziness;    TECHNIQUE:  Low dose axial images were obtained through the head.  Coronal and sagittal reformations were also performed. Contrast was not administered.    COMPARISON:  None.    FINDINGS:  Examination is mildly limited by motion artifact.    No evidence of acute territorial infarct, hemorrhage, mass effect, or midline shift.    Age-appropriate minimal generalized cerebral volume loss.  No evidence of hydrocephalus.    No displaced calvarial fracture.    Visualized paranasal sinuses and mastoid air cells are clear.                                 Medical Decision Making:   History:   Old Medical Records: I decided to obtain old medical records.  Initial Assessment:   Emergent evaluation 59-year-old female presenting with acute blurred vision  of the left eye while driving.    Differential Diagnosis:   CVA, TIA, CRAO, binocular diplopia, muscle fatigue, retinal detachment, iritis  Clinical Tests:   Lab Tests: Ordered and Reviewed  Radiological Study: Reviewed and Ordered  ED Management:  - labs  - CT head    Labs reviewed with no significant abnormality.  CT head unremarkable.  Eye exam:  Intra-ocular pressures are normal. No fluorescein uptake.  EOM intact, no cranial nerve deficits.  Visual acuity 20/30 left eye, 20/25 right eye.    Patient denies any visual field cuts or acute vision loss.  Low suspicion for CRAO, TIA, CVA.    Recommend follow-up with Ophthalmology for further evaluation.              Scribe Attestation:   Scribe #1: I performed the above scribed service and the documentation accurately describes the services I performed. I attest to the accuracy of the note.                          Clinical Impression:       ICD-10-CM ICD-9-CM   1. Blurred vision H53.8 368.8   2. Dizziness R42 780.4         Disposition:   Disposition: Discharged  Condition: Stable                     Isha Grewal MD  01/11/20 0056

## 2020-02-03 ENCOUNTER — PATIENT OUTREACH (OUTPATIENT)
Dept: ADMINISTRATIVE | Facility: HOSPITAL | Age: 60
End: 2020-02-03

## 2020-02-18 ENCOUNTER — PATIENT MESSAGE (OUTPATIENT)
Dept: ADMINISTRATIVE | Facility: HOSPITAL | Age: 60
End: 2020-02-18

## 2020-02-18 ENCOUNTER — PATIENT OUTREACH (OUTPATIENT)
Dept: ADMINISTRATIVE | Facility: HOSPITAL | Age: 60
End: 2020-02-18

## 2020-02-20 ENCOUNTER — PATIENT OUTREACH (OUTPATIENT)
Dept: ADMINISTRATIVE | Facility: OTHER | Age: 60
End: 2020-02-20

## 2020-02-21 ENCOUNTER — OFFICE VISIT (OUTPATIENT)
Dept: ORTHOPEDICS | Facility: CLINIC | Age: 60
End: 2020-02-21
Payer: COMMERCIAL

## 2020-02-21 ENCOUNTER — HOSPITAL ENCOUNTER (OUTPATIENT)
Dept: RADIOLOGY | Facility: HOSPITAL | Age: 60
Discharge: HOME | End: 2020-02-21
Attending: NURSE PRACTITIONER
Payer: COMMERCIAL

## 2020-02-21 VITALS
DIASTOLIC BLOOD PRESSURE: 79 MMHG | SYSTOLIC BLOOD PRESSURE: 123 MMHG | BODY MASS INDEX: 28.36 KG/M2 | HEIGHT: 63 IN | HEART RATE: 76 BPM | WEIGHT: 160.06 LBS

## 2020-02-21 DIAGNOSIS — M25.562 LEFT KNEE PAIN, UNSPECIFIED CHRONICITY: Primary | ICD-10-CM

## 2020-02-21 DIAGNOSIS — M25.562 LEFT KNEE PAIN, UNSPECIFIED CHRONICITY: ICD-10-CM

## 2020-02-21 PROCEDURE — 73560 X-RAY EXAM OF KNEE 1 OR 2: CPT | Mod: TC,RT

## 2020-02-21 PROCEDURE — 3008F PR BODY MASS INDEX (BMI) DOCUMENTED: ICD-10-PCS | Mod: CPTII,S$GLB,, | Performed by: NURSE PRACTITIONER

## 2020-02-21 PROCEDURE — 99203 PR OFFICE/OUTPT VISIT, NEW, LEVL III, 30-44 MIN: ICD-10-PCS | Mod: S$GLB,,, | Performed by: NURSE PRACTITIONER

## 2020-02-21 PROCEDURE — 99999 PR PBB SHADOW E&M-EST. PATIENT-LVL III: ICD-10-PCS | Mod: PBBFAC,,, | Performed by: NURSE PRACTITIONER

## 2020-02-21 PROCEDURE — 73560 XR KNEE ORTHO LEFT: ICD-10-PCS | Mod: 26,59,RT, | Performed by: RADIOLOGY

## 2020-02-21 PROCEDURE — 99999 PR PBB SHADOW E&M-EST. PATIENT-LVL III: CPT | Mod: PBBFAC,,, | Performed by: NURSE PRACTITIONER

## 2020-02-21 PROCEDURE — 73560 X-RAY EXAM OF KNEE 1 OR 2: CPT | Mod: 26,59,RT, | Performed by: RADIOLOGY

## 2020-02-21 PROCEDURE — 99203 OFFICE O/P NEW LOW 30 MIN: CPT | Mod: S$GLB,,, | Performed by: NURSE PRACTITIONER

## 2020-02-21 PROCEDURE — 73562 X-RAY EXAM OF KNEE 3: CPT | Mod: 26,LT,, | Performed by: RADIOLOGY

## 2020-02-21 PROCEDURE — 73562 XR KNEE ORTHO LEFT: ICD-10-PCS | Mod: 26,LT,, | Performed by: RADIOLOGY

## 2020-02-21 PROCEDURE — 3008F BODY MASS INDEX DOCD: CPT | Mod: CPTII,S$GLB,, | Performed by: NURSE PRACTITIONER

## 2020-02-21 NOTE — PROGRESS NOTES
"CC: mass of left knee      HPI: Pt with c/o mass to the lateral left knee for the past 30 years which has recently grown and become more tender. She was seen by a dermatologist for removal, but they referred her to orthopedics due to the size and location. She has not taken any medication for the tenderness since it isn't too painful, but she is ready to have it removed as it is interfering with her comfort due to increased size. Her medical history  is positive for nicotine use which could affect healing. She is on medication for COPD. She has a history of treated cervical cancer. She is still working and is very active. She does not currently have a regular exercise regimen.     ROS  General: denies fever and chills  Resp: no c/o sob  CVS: no c/o cp  MSK: c/o "mass" which is causing tenderness to the lateral knee    PE  General: AAOx3, pleasant and cooperative  Resp: respirations even and unlabored  MSK: left knee exam  0 degrees extension  120 degrees flexion  No warmth or erythema   - effusion  5/5 quad strength  - crepitus  + mass which is round and located to the lateral knee. There is tenderness over the mass and surrounding area    Xray:  Reviewed by me with the patient: Left knee: No fracture dislocation bone destruction or OCD seen. Mass noted to the lateral knee in the soft tissue. Unable to determine if there is bone involvement based on the xray.        Assessment:  Mass of left lateral knee    Plan:  Will defer further imaging until she is seen by Dr. Ramirez.   She needs to quit smoking to improve her ability to heal  Referral to Dr. Ramirez to evaluate for ability to remove  "

## 2020-02-27 ENCOUNTER — PATIENT OUTREACH (OUTPATIENT)
Dept: ADMINISTRATIVE | Facility: OTHER | Age: 60
End: 2020-02-27

## 2020-02-28 ENCOUNTER — OFFICE VISIT (OUTPATIENT)
Dept: ORTHOPEDICS | Facility: CLINIC | Age: 60
End: 2020-02-28
Payer: COMMERCIAL

## 2020-02-28 VITALS
DIASTOLIC BLOOD PRESSURE: 78 MMHG | HEART RATE: 75 BPM | WEIGHT: 160.94 LBS | TEMPERATURE: 98 F | BODY MASS INDEX: 28.52 KG/M2 | HEIGHT: 63 IN | SYSTOLIC BLOOD PRESSURE: 121 MMHG

## 2020-02-28 DIAGNOSIS — D48.19 NEOPLASM OF UNCERTAIN BEHAVIOR OF CONNECTIVE AND OTHER SOFT TISSUE: Primary | ICD-10-CM

## 2020-02-28 DIAGNOSIS — R22.42 KNEE MASS, LEFT: ICD-10-CM

## 2020-02-28 DIAGNOSIS — M25.562 LEFT KNEE PAIN, UNSPECIFIED CHRONICITY: ICD-10-CM

## 2020-02-28 PROCEDURE — 99999 PR PBB SHADOW E&M-EST. PATIENT-LVL III: ICD-10-PCS | Mod: PBBFAC,,, | Performed by: ORTHOPAEDIC SURGERY

## 2020-02-28 PROCEDURE — 99214 OFFICE O/P EST MOD 30 MIN: CPT | Mod: S$GLB,,, | Performed by: ORTHOPAEDIC SURGERY

## 2020-02-28 PROCEDURE — 3008F BODY MASS INDEX DOCD: CPT | Mod: CPTII,S$GLB,, | Performed by: ORTHOPAEDIC SURGERY

## 2020-02-28 PROCEDURE — 3008F PR BODY MASS INDEX (BMI) DOCUMENTED: ICD-10-PCS | Mod: CPTII,S$GLB,, | Performed by: ORTHOPAEDIC SURGERY

## 2020-02-28 PROCEDURE — 99214 PR OFFICE/OUTPT VISIT, EST, LEVL IV, 30-39 MIN: ICD-10-PCS | Mod: S$GLB,,, | Performed by: ORTHOPAEDIC SURGERY

## 2020-02-28 PROCEDURE — 99999 PR PBB SHADOW E&M-EST. PATIENT-LVL III: CPT | Mod: PBBFAC,,, | Performed by: ORTHOPAEDIC SURGERY

## 2020-02-28 NOTE — LETTER
February 28, 2020      Chastity Nayak NP  [address] [facility] [address]           [place] [facility]  [address] [facility]  [facility] [address]  Phone: [phone]  Fax: [phone]          Patient: Cassia Kelly   MR Number: [id number]   YOB: 1960   Date of Visit: 2/28/2020       Dear Chastity Nayak:    Thank you for referring Cassia Kelly to me for evaluation. Attached you will find relevant portions of my assessment and plan of care.    If you have questions, please do not hesitate to call me. I look forward to following Cassia Kelly along with you.    Sincerely,    Trinidad Tristan MD    Enclosure  CC:  No Recipients    If you would like to receive this communication electronically, please contact [email or web address] or [phone] to request more information on [employer] access.    For providers and/or their staff who would like to refer a patient to [facility], please contact us through our one-stop-shop provider referral line, [facility], at [phone].    If you feel you have received this communication in error or would no longer like to receive these types of communications, please e-mail [email or web address]

## 2020-02-28 NOTE — PROGRESS NOTES
Subjective:      Patient ID: Cassia Kelly is a 60 y.o. female.    Chief Complaint: L knee mass  HPI     Cassia Kelly is a 60 y.o. female who presents to clinic for evaluation of left lateral knee mass.  Patient reports slowly enlarging mass she first noticed 30 + years ago.   Denies pain, tenderness, numbness, tingling.   Size does not fluctuate with activity.   Hx of cervical ca with hysterectomy   No fam hx of ST/bone tumors  0.5ppd smoker      Past Medical History:   Diagnosis Date    Abnormal Pap smear     Hyst    Acute cystitis 2018    Adenomatous polyp of colon 2015    Repeat c-scope 5 yrs     Cervical cancer     COPD (chronic obstructive pulmonary disease)     Microscopic hematuria 2013    Smoker 2015    STD (sexually transmitted disease)     Ureteral calculus, left 2013     Past Surgical History:   Procedure Laterality Date    APPENDECTOMY       SECTION      x1    HYSTERECTOMY      LAVERN/LSO (Abn Pap)    OOPHORECTOMY      Vocal chord nodules removed       Family History   Problem Relation Age of Onset    Hypertension Father     Hypertension Mother     Heart disease Sister         arrhy    Heart disease Brother         MI    Cancer Paternal Aunt         colon ?    Melanoma Neg Hx     Psoriasis Neg Hx     Lupus Neg Hx      Social History     Socioeconomic History    Marital status:      Spouse name: Not on file    Number of children: Not on file    Years of education: Not on file    Highest education level: Not on file   Occupational History    Not on file   Social Needs    Financial resource strain: Not on file    Food insecurity:     Worry: Not on file     Inability: Not on file    Transportation needs:     Medical: Not on file     Non-medical: Not on file   Tobacco Use    Smoking status: Current Some Day Smoker     Packs/day: 1.00     Types: Cigarettes    Smokeless tobacco: Never Used   Substance and Sexual  Activity    Alcohol use: No    Drug use: Not on file    Sexual activity: Yes     Partners: Male     Birth control/protection: Post-menopausal   Lifestyle    Physical activity:     Days per week: Not on file     Minutes per session: Not on file    Stress: Not on file   Relationships    Social connections:     Talks on phone: Not on file     Gets together: Not on file     Attends Spiritism service: Not on file     Active member of club or organization: Not on file     Attends meetings of clubs or organizations: Not on file     Relationship status: Not on file   Other Topics Concern    Are you pregnant or think you may be? No    Breast-feeding No   Social History Narrative    Not on file     Current Outpatient Medications on File Prior to Visit   Medication Sig Dispense Refill    acetaminophen (TYLENOL) 500 MG tablet Take 500 mg by mouth every 6 (six) hours as needed for Pain.      albuterol (PROVENTIL) 2.5 mg /3 mL (0.083 %) nebulizer solution Take 3 mLs (2.5 mg total) by nebulization every 6 (six) hours as needed for Wheezing. 50 Box 2    albuterol (PROVENTIL/VENTOLIN HFA) 90 mcg/actuation inhaler Inhale 2 puffs into the lungs every 4 (four) hours as needed for Wheezing or Shortness of Breath. 1 Inhaler 3    fluticasone-umeclidin-vilanter (TRELEGY ELLIPTA) 100-62.5-25 mcg DsDv Inhale 1 puff into the lungs once daily. 1 each 6    ipratropium (ATROVENT) 0.02 % nebulizer solution Take 2.5 mLs (500 mcg total) by nebulization 4 (four) times daily. Rescue 1 Box 0    naproxen sodium (ALEVE ORAL) Take 1 tablet by mouth daily as needed (headache).      azithromycin (ZITHROMAX Z-PALMA) 250 MG tablet Take 2 po today then 1 a day for 4 days (Patient not taking: Reported on 2/21/2020) 6 tablet 0    nicotine 21-14-7 mg/24 hr PTDS Place 1 each onto the skin once daily. (Patient not taking: Reported on 2/21/2020) 56 each      No current facility-administered medications on file prior to visit.      Review of patient's  "allergies indicates:   Allergen Reactions    Penicillins Rash       Review of Systems   Constitution: Negative for chills, decreased appetite, diaphoresis, fever, malaise/fatigue, night sweats and weight loss.   HENT: Negative for hearing loss.    Eyes: Negative for blurred vision and double vision.   Cardiovascular: Negative for chest pain, claudication and leg swelling.   Respiratory: Negative for shortness of breath.    Endocrine: Negative for polydipsia, polyphagia and polyuria.   Hematologic/Lymphatic: Negative for adenopathy and bleeding problem. Does not bruise/bleed easily.   Skin: Negative for poor wound healing.   Gastrointestinal: Negative for diarrhea and heartburn.   Genitourinary: Negative for bladder incontinence.   Neurological: Negative for focal weakness, headaches, numbness, paresthesias and sensory change.   Psychiatric/Behavioral: The patient is not nervous/anxious.    Allergic/Immunologic: Negative for persistent infections.              Objective:      Body mass index is 28.51 kg/m².  Vitals:    02/28/20 0821   BP: 121/78   BP Location: Right arm   Patient Position: Sitting   BP Method: Medium (Automatic)   Pulse: 75   Temp: 97.9 °F (36.6 °C)   Weight: 73 kg (160 lb 15 oz)   Height: 5' 3" (1.6 m)         Ortho/SPM Exam   LLE:  Duncan size soft mobile mass lateral knee- extraarticular   overlying skin discoloration and ecchymosis- non blanching, non adherent  No transillumination  No TTP  5/5 motor and SILT throughout   2+ DP          Assessment:       Encounter Diagnosis   Name Primary?    Left knee pain, unspecified chronicity Yes          Plan:       Diagnoses and all orders for this visit:    Neoplasm of uncertain behavior of connective and other soft tissue    Left knee pain, unspecified chronicity  -     US Extremity Non Vascular Complete Left; Future    Knee mass, left        - given overlying skin discoloration and mass does not transilluminate concern for possible vascular " malformation- will obtain US L knee mass, RTC to discuss results

## 2020-02-28 NOTE — PROGRESS NOTES
I have reviewed the notes, assessments, and/or procedures performed by Dr. Tristan, I concur with her/his documentation of Cassia Kelly.     Over 30 year history, asymptomatic, slowly enlarging, subcutaneous, does not involve the knee joint.  Low concern for malignancy, but no imaging done.  Clinic appearance supports a hemorraghic cyst or a vascular malformation, with purple-ismael discoloration of the skin.  No bruits palpable.  No calcifications on knee radiographs.    Get an ultrasound, will contact with results and schedule excisional biopsy in the near future.    I spent more than 30 minutes reviewing this patient's medical records, imaging studies, and taking a full history and physical, and discussing his treatment plan and expected prognosis.  More than 50% of this time was spent face to face with the patient.

## 2020-02-29 ENCOUNTER — HOSPITAL ENCOUNTER (OUTPATIENT)
Dept: RADIOLOGY | Facility: HOSPITAL | Age: 60
Discharge: HOME | End: 2020-02-29
Attending: STUDENT IN AN ORGANIZED HEALTH CARE EDUCATION/TRAINING PROGRAM
Payer: COMMERCIAL

## 2020-02-29 DIAGNOSIS — M25.562 LEFT KNEE PAIN, UNSPECIFIED CHRONICITY: ICD-10-CM

## 2020-02-29 PROCEDURE — 76881 US COMPL JOINT R-T W/IMG: CPT | Mod: 26,LT,, | Performed by: RADIOLOGY

## 2020-02-29 PROCEDURE — 76881 US COMPL JOINT R-T W/IMG: CPT | Mod: TC,LT

## 2020-02-29 PROCEDURE — 76881 US EXTREMITY NON VASCULAR COMPLETE LEFT: ICD-10-PCS | Mod: 26,LT,, | Performed by: RADIOLOGY

## 2020-03-04 ENCOUNTER — TELEPHONE (OUTPATIENT)
Dept: ORTHOPEDICS | Facility: CLINIC | Age: 60
End: 2020-03-04

## 2020-03-04 NOTE — TELEPHONE ENCOUNTER
We spoke    I called to tell her I didn't forget about her   That I will see Dr Ramirez Friday -he had stated in his last notes  for her to return after her ultra sound   She would  rather just speak over the phone with Dr Ramirez , if possible  To save the $60.00  & schedule her surgery  She is tied up with family medical problems  At the moment

## 2020-03-09 ENCOUNTER — TELEPHONE (OUTPATIENT)
Dept: ORTHOPEDICS | Facility: CLINIC | Age: 60
End: 2020-03-09

## 2020-03-09 NOTE — TELEPHONE ENCOUNTER
We spoke told her we will do her surgery March 20  I see that she will have to come in to sign the consent that am  When I speak with Dr Ramirez this Friday  I can finalize everything with her then

## 2020-03-13 ENCOUNTER — TELEPHONE (OUTPATIENT)
Dept: ORTHOPEDICS | Facility: CLINIC | Age: 60
End: 2020-03-13

## 2020-03-13 NOTE — TELEPHONE ENCOUNTER
We spoke  She had 5 teeth pulled yesterday due to infection   We planned on surgery next Friday 3/20   Will have to inform Dr Ramirez & get back to her

## 2020-03-16 ENCOUNTER — TELEPHONE (OUTPATIENT)
Dept: ORTHOPEDICS | Facility: CLINIC | Age: 60
End: 2020-03-16

## 2020-03-16 DIAGNOSIS — R22.42 KNEE MASS, LEFT: Primary | ICD-10-CM

## 2020-03-16 NOTE — TELEPHONE ENCOUNTER
I spoke with Dr Ramirez..... due to the  Coronavirus scare  Mrs [facility] surgery is being postponed until further notice  This will also give her extractions time to heal

## 2020-05-28 ENCOUNTER — TELEPHONE (OUTPATIENT)
Dept: ORTHOPEDICS | Facility: CLINIC | Age: 60
End: 2020-05-28

## 2020-05-28 NOTE — TELEPHONE ENCOUNTER
We spoke  She is interested in having her surgery on June 19   Will talk with Dr Ramirez & let her know

## 2020-08-27 DIAGNOSIS — Z12.39 BREAST CANCER SCREENING: ICD-10-CM

## 2020-08-28 DIAGNOSIS — J44.9 CHRONIC OBSTRUCTIVE PULMONARY DISEASE, UNSPECIFIED COPD TYPE: ICD-10-CM

## 2020-08-28 NOTE — TELEPHONE ENCOUNTER
----- Message from Ashley Menchaca sent at 8/28/2020 12:51 PM CDT -----  Contact: Pt Cassia@[phone]--  Rx Refill/Request     Is this a Refill:--Yes--     Rx Name and Strength:    1.fluticasone-umeclidin-vilanter (TRELEGY ELLIPTA) 100-62.5-25 mcg     Preferred Pharmacy with phone number:--[facility]--[phone]--   [address] [address]    Communication Preference:--Cassia--[phone]--    Additional Information:Per pt states that he pharmacy has been faxing over refill request for the medication listed above for 2-weeks now.

## 2020-10-05 ENCOUNTER — PATIENT MESSAGE (OUTPATIENT)
Dept: ADMINISTRATIVE | Facility: HOSPITAL | Age: 60
End: 2020-10-05

## 2021-02-01 ENCOUNTER — TELEPHONE (OUTPATIENT)
Dept: INTERNAL MEDICINE | Facility: CLINIC | Age: 61
End: 2021-02-01

## 2021-02-01 DIAGNOSIS — J44.1 COPD EXACERBATION: Primary | ICD-10-CM

## 2021-03-06 ENCOUNTER — IMMUNIZATION (OUTPATIENT)
Dept: PRIMARY CARE CLINIC | Facility: CLINIC | Age: 61
End: 2021-03-06
Payer: COMMERCIAL

## 2021-03-06 DIAGNOSIS — Z23 NEED FOR VACCINATION: Primary | ICD-10-CM

## 2021-03-06 PROCEDURE — 91300 PR SARS-COV- 2 COVID-19 VACCINE, NO PRSV, 30MCG/0.3ML, IM: ICD-10-PCS | Mod: S$GLB,,, | Performed by: INTERNAL MEDICINE

## 2021-03-06 PROCEDURE — 0001A PR IMMUNIZ ADMIN, SARS-COV-2 COVID-19 VACC, 30MCG/0.3ML, 1ST DOSE: CPT | Mod: CV19,S$GLB,, | Performed by: INTERNAL MEDICINE

## 2021-03-06 PROCEDURE — 91300 PR SARS-COV- 2 COVID-19 VACCINE, NO PRSV, 30MCG/0.3ML, IM: CPT | Mod: S$GLB,,, | Performed by: INTERNAL MEDICINE

## 2021-03-06 PROCEDURE — 0001A PR IMMUNIZ ADMIN, SARS-COV-2 COVID-19 VACC, 30MCG/0.3ML, 1ST DOSE: ICD-10-PCS | Mod: CV19,S$GLB,, | Performed by: INTERNAL MEDICINE

## 2021-03-06 RX ADMIN — Medication 0.3 ML: at 12:03

## 2021-03-27 ENCOUNTER — IMMUNIZATION (OUTPATIENT)
Dept: PRIMARY CARE CLINIC | Facility: CLINIC | Age: 61
End: 2021-03-27
Payer: COMMERCIAL

## 2021-03-27 DIAGNOSIS — Z23 NEED FOR VACCINATION: Primary | ICD-10-CM

## 2021-03-27 PROCEDURE — 0002A PR IMMUNIZ ADMIN, SARS-COV-2 COVID-19 VACC, 30MCG/0.3ML, 2ND DOSE: CPT | Mod: CV19,S$GLB,, | Performed by: INTERNAL MEDICINE

## 2021-03-27 PROCEDURE — 0002A PR IMMUNIZ ADMIN, SARS-COV-2 COVID-19 VACC, 30MCG/0.3ML, 2ND DOSE: ICD-10-PCS | Mod: CV19,S$GLB,, | Performed by: INTERNAL MEDICINE

## 2021-03-27 PROCEDURE — 91300 PR SARS-COV- 2 COVID-19 VACCINE, NO PRSV, 30MCG/0.3ML, IM: ICD-10-PCS | Mod: S$GLB,,, | Performed by: INTERNAL MEDICINE

## 2021-03-27 PROCEDURE — 91300 PR SARS-COV- 2 COVID-19 VACCINE, NO PRSV, 30MCG/0.3ML, IM: CPT | Mod: S$GLB,,, | Performed by: INTERNAL MEDICINE

## 2021-03-27 RX ADMIN — Medication 0.3 ML: at 12:03

## 2021-04-05 ENCOUNTER — PATIENT MESSAGE (OUTPATIENT)
Dept: ADMINISTRATIVE | Facility: HOSPITAL | Age: 61
End: 2021-04-05

## 2021-07-06 ENCOUNTER — PATIENT MESSAGE (OUTPATIENT)
Dept: ADMINISTRATIVE | Facility: HOSPITAL | Age: 61
End: 2021-07-06

## 2021-10-04 ENCOUNTER — PATIENT MESSAGE (OUTPATIENT)
Dept: ADMINISTRATIVE | Facility: HOSPITAL | Age: 61
End: 2021-10-04

## 2022-03-16 ENCOUNTER — PATIENT MESSAGE (OUTPATIENT)
Dept: ADMINISTRATIVE | Facility: HOSPITAL | Age: 62
End: 2022-03-16
Payer: COMMERCIAL

## 2022-11-21 ENCOUNTER — TELEPHONE (OUTPATIENT)
Dept: INTERNAL MEDICINE | Facility: CLINIC | Age: 62
End: 2022-11-21
Payer: COMMERCIAL

## 2022-11-21 NOTE — TELEPHONE ENCOUNTER
Called and spoke to pt and adv her that it has been over 3 yrs since she was last seen by Dr Rondon   So she would have to est with a new provider  We just her schduled to see Arslan in Jan since that will be when she gets her new ins

## 2022-11-21 NOTE — TELEPHONE ENCOUNTER
----- Message from Dalila Brand sent at 11/21/2022 12:16 PM CST -----  Contact: Pt Mobile [phone]  Patient would like a call back in regards to her wanting to come in to be seen for pain in the middle of her back.     No appointments were available.

## 2024-04-14 NOTE — NURSING
Pt admitted from [facility]. Oriented to unit, room and call light. Pt is a smoker for 30+ years. Discussed smoking cessation with pt. Pt smokes 1/2 pack of cigarettes per day. Pt reports wanting to stop smoking. Head to toe assessment complete. Pt denies chest pain.   63